# Patient Record
Sex: MALE | Race: WHITE | Employment: FULL TIME | URBAN - METROPOLITAN AREA
[De-identification: names, ages, dates, MRNs, and addresses within clinical notes are randomized per-mention and may not be internally consistent; named-entity substitution may affect disease eponyms.]

---

## 2018-02-22 ENCOUNTER — OFFICE VISIT (OUTPATIENT)
Dept: URGENT CARE | Facility: PHYSICIAN GROUP | Age: 48
End: 2018-02-22
Payer: COMMERCIAL

## 2018-02-22 ENCOUNTER — APPOINTMENT (OUTPATIENT)
Dept: RADIOLOGY | Facility: IMAGING CENTER | Age: 48
End: 2018-02-22
Attending: NURSE PRACTITIONER
Payer: COMMERCIAL

## 2018-02-22 VITALS
BODY MASS INDEX: 38.77 KG/M2 | OXYGEN SATURATION: 97 % | WEIGHT: 247 LBS | DIASTOLIC BLOOD PRESSURE: 80 MMHG | TEMPERATURE: 98.6 F | HEART RATE: 69 BPM | SYSTOLIC BLOOD PRESSURE: 126 MMHG | HEIGHT: 67 IN

## 2018-02-22 DIAGNOSIS — M25.562 LATERAL KNEE PAIN, LEFT: ICD-10-CM

## 2018-02-22 DIAGNOSIS — S86.912A STRAIN OF KNEE AND LEG, LEFT, INITIAL ENCOUNTER: ICD-10-CM

## 2018-02-22 PROCEDURE — 73562 X-RAY EXAM OF KNEE 3: CPT | Mod: TC,LT | Performed by: NURSE PRACTITIONER

## 2018-02-22 PROCEDURE — 99204 OFFICE O/P NEW MOD 45 MIN: CPT | Performed by: NURSE PRACTITIONER

## 2018-02-22 RX ORDER — MELOXICAM 7.5 MG/1
7.5 TABLET ORAL DAILY
Qty: 14 TAB | Refills: 0 | Status: SHIPPED | OUTPATIENT
Start: 2018-02-22 | End: 2019-11-22

## 2018-02-23 ASSESSMENT — ENCOUNTER SYMPTOMS
FALLS: 0
TINGLING: 0
FEVER: 0
WEAKNESS: 0
MYALGIAS: 1
SENSORY CHANGE: 0
CHILLS: 0

## 2018-02-23 NOTE — PROGRESS NOTES
"Subjective:      Devin Sampson is a 47 y.o. male who presents with Knee Pain (L knee pain, onset yesterday 3:30pm )            HPI  Devin is here for left knee pain. States had injured it a year ago \"rough housing\" but states he \"just lived with occasional ache and pain\". 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 C/o left knee lateral aspect pain. No RICE method instilled.     PMH:  has no past medical history on file.  MEDS:   Current Outpatient Prescriptions:   •  meloxicam (MOBIC) 7.5 MG Tab, Take 1 Tab by mouth every day. Do not use with Ibuprofen, Disp: 14 Tab, Rfl: 0  ALLERGIES: Not on File  SURGHX: History reviewed. No pertinent surgical history.  SOCHX:  reports that he has never smoked. He has never used smokeless tobacco. He reports that he does not drink alcohol or use drugs.  FH: Family history was reviewed, no pertinent findings to report    Review of Systems   Constitutional: Negative for chills, fever and malaise/fatigue.   Cardiovascular: Negative for leg swelling.   Musculoskeletal: Positive for joint pain and myalgias. Negative for falls.   Neurological: Negative for tingling, sensory change and weakness.   Endo/Heme/Allergies: Negative for environmental allergies.   All other systems reviewed and are negative.         Objective:     /80   Pulse 69   Temp 37 °C (98.6 °F)   Ht 1.702 m (5' 7\")   Wt 112 kg (247 lb)   SpO2 97%   BMI 38.69 kg/m²      Physical Exam   Constitutional: He is oriented to person, place, and time. Vital signs are normal. He appears well-developed and well-nourished. He is active and cooperative.  Non-toxic appearance. He does not have a sickly appearance. He does not appear ill. No distress.   HENT:   Head: Normocephalic.   Eyes: Conjunctivae and EOM are normal. Pupils are equal, round, and reactive to light.   Neck: Normal range of motion. Neck supple. "   Cardiovascular: Normal rate.    Pulmonary/Chest: Effort normal.   Musculoskeletal:        Left knee: He exhibits normal range of motion, no swelling, no effusion, no ecchymosis, no deformity, no laceration, no erythema, normal alignment, no LCL laxity, normal patellar mobility, no bony tenderness, normal meniscus and no MCL laxity. Tenderness found. Lateral joint line tenderness noted.        Legs:  No swelling, redness or signs of trauma seen at left knee joint. FROM but has discomfort with flex/extend. No abnormal gait.   Neurological: He is alert and oriented to person, place, and time.   Skin: Skin is warm and dry. He is not diaphoretic. No erythema.   Vitals reviewed.         FINDINGS:  There is no evidence of fracture or dislocation. Alignment is maintained. There is mild spurring of the lateral tibial plateau. No joint effusion is identified. There is minimal medial tibiofemoral joint space narrowing. There is mild spurring of the   tibial spines.     MA applied ace wrap  Assessment/Plan:     1. Lateral knee pain, left    - DX-KNEE 3 VIEWS LEFT; Future  - REFERRAL TO SPORTS MEDICINE  - meloxicam (MOBIC) 7.5 MG Tab; Take 1 Tab by mouth every day. Do not use with Ibuprofen  Dispense: 14 Tab; Refill: 0    2. Strain of knee and leg, left, initial encounter    - DX-KNEE 3 VIEWS LEFT; Future  - REFERRAL TO SPORTS MEDICINE  - meloxicam (MOBIC) 7.5 MG Tab; Take 1 Tab by mouth every day. Do not use with Ibuprofen  Dispense: 14 Tab; Refill: 0    May use cool compresses for swelling prn  May utilize RICE method prn  May apply topical analgesics prn  Perform proper body mechanics with lifting, twisting, bending and walking  Monitor for deformity, numbness/tingling in toes, decreased ROM with walking difficulty- need re-evaluation  Follow up with Sports Med, patient given paperwork info

## 2018-03-06 ENCOUNTER — OFFICE VISIT (OUTPATIENT)
Dept: MEDICAL GROUP | Facility: CLINIC | Age: 48
End: 2018-03-06
Payer: COMMERCIAL

## 2018-03-06 VITALS
WEIGHT: 247 LBS | TEMPERATURE: 97.9 F | SYSTOLIC BLOOD PRESSURE: 126 MMHG | OXYGEN SATURATION: 95 % | HEART RATE: 92 BPM | DIASTOLIC BLOOD PRESSURE: 84 MMHG | RESPIRATION RATE: 18 BRPM | BODY MASS INDEX: 38.77 KG/M2 | HEIGHT: 67 IN

## 2018-03-06 DIAGNOSIS — M25.462 KNEE EFFUSION, LEFT: ICD-10-CM

## 2018-03-06 DIAGNOSIS — M17.12 OSTEOARTHROSIS, LOCALIZED, PRIMARY, KNEE, LEFT: ICD-10-CM

## 2018-03-06 DIAGNOSIS — E66.09 CLASS 2 OBESITY DUE TO EXCESS CALORIES WITHOUT SERIOUS COMORBIDITY WITH BODY MASS INDEX (BMI) OF 38.0 TO 38.9 IN ADULT: ICD-10-CM

## 2018-03-06 PROCEDURE — 20610 DRAIN/INJ JOINT/BURSA W/O US: CPT | Mod: LT | Performed by: FAMILY MEDICINE

## 2018-03-06 PROCEDURE — 99203 OFFICE O/P NEW LOW 30 MIN: CPT | Mod: 25 | Performed by: FAMILY MEDICINE

## 2018-03-06 RX ORDER — TRIAMCINOLONE ACETONIDE 40 MG/ML
40 INJECTION, SUSPENSION INTRA-ARTICULAR; INTRAMUSCULAR ONCE
Status: COMPLETED | OUTPATIENT
Start: 2018-03-06 | End: 2018-03-06

## 2018-03-06 RX ADMIN — TRIAMCINOLONE ACETONIDE 40 MG: 40 INJECTION, SUSPENSION INTRA-ARTICULAR; INTRAMUSCULAR at 09:42

## 2018-03-06 NOTE — PROGRESS NOTES
"CHIEF COMPLAINT:  Devin Sampson male presenting at the request of  JAG Zee for evaluation of knee pain.     Devin Sampson is complaining of left knee pain  present for approximately 1 year  Rough play with a friend and valgus injury to the LEFT knee in early 2017   Pain is at the anterior, medial , lateral knee  Quality is aching  Pain is non-radiating   Improved with resting  Aggravated by going up ladders (works as a )  no prior problems with this area in the past, previous knee injury (played soccer)   Prior Treatments: seen at   Prior studies: X-Ray   Medications tried for pain include: meloxicam which helps some, but causing reflux  Mechanical Symptom history: No Locking    REVIEW OF SYSTEMS  No Nausea, No Vomiting, No Chest Pain, No Shortness of Breath, No Headache, Dizziness  Which occurs with his seasonal allergies    PAST MEDICAL HISTORY:   History reviewed. No pertinent past medical history.    PMH:  has no past medical history on file.  MEDS:   Current Outpatient Prescriptions:   •  meloxicam (MOBIC) 7.5 MG Tab, Take 1 Tab by mouth every day. Do not use with Ibuprofen, Disp: 14 Tab, Rfl: 0  ALLERGIES: No Known Allergies  SURGHX: History reviewed. No pertinent surgical history.  SOCHX:  reports that he has never smoked. He has never used smokeless tobacco. He reports that he does not drink alcohol or use drugs.  FH: Family history was reviewed, no pertinent findings to report     PHYSICAL EXAM:  /84   Pulse 92   Temp 36.6 °C (97.9 °F)   Resp 18   Ht 1.702 m (5' 7\")   Wt 112 kg (247 lb)   SpO2 95%   BMI 38.69 kg/m²       obese in no apparent distress, alert and oriented x 3.  Gait: antalgic     RIGHT Knee:  Slight Varus and No Swelling  Range of Motion Intact  Trace effusion  Patellar No tenderness and no apprehension  Medial Joint Line Non-tender and NEGATIVE Chrissy  Lateral Joint Line Non-tender and NEGATIVE Chrissy  Trace Laxity " with Varus stress  Trace Laxity with Valgus stress  Lachman's testing is Trace  Posterior Drawer Testing is Trace  The leg is otherwise neurovascularly intact    LEFT Knee:  Slight Varus and No Swelling   Range of Motion Slightly limited with Flexion  2+ effusion  Patellar Medial facet tenderness and Extensor mechanism intact  Medial Joint Line Non-tender and NEGATIVE Chrissy  Lateral Joint Line Tenderness and NEGATIVE Chrissy  Trace Laxity with Varus stress  Trace Laxity with Valgus stress  Lachman's testing is Trace  Posterior Drawer Testing is Trace  The leg is otherwise neurovascularly intact    Additional Findings: Tight hamstrings      1. Osteoarthrosis, localized, primary, knee, left  triamcinolone acetonide (KENALOG-40) injection 40 mg   2. Knee effusion, left     3. Class 2 obesity due to excess calories without serious comorbidity with body mass index (BMI) of 38.0 to 38.9 in adult       MILD effusion in the LEFT knee  Osteoarthritis which has been symptomatic on and off for the past year since an injury while horsing around with a coworker  No recent injury  Intra-articular LEFT knee corticosteroid injection performed in the office TODAY (March 6, 2018)  Fitted with functional knee brace    PROCEDURE NOTE:  left Knee corticosteroid injection  Risks and benefits discussed  Informed consent obtained  Knee prepped in sterile fashion utilizing a felecia- inferior approach  40 mg of Kenalog and 5 cc of Marcaine injected into the knee joint space  Vapocoolant spray was utilized  Patient tolerated the procedure well  Postprocedure care and reflex discussed    Return in about 4 weeks (around 4/3/2018). To see how he is doing after his LEFT knee intra-articular corticosteroid injection                                                Results for orders placed in visit on 02/22/18   DX-KNEE 3 VIEWS LEFT    Impression No evidence of acute fracture or dislocation.    Minimal degenerative changes.                                               done elsewhere and reviewed independently by me    Thank you JAG Zee for allowing me to participate in caring for your patient.

## 2018-04-16 ENCOUNTER — OFFICE VISIT (OUTPATIENT)
Dept: MEDICAL GROUP | Facility: CLINIC | Age: 48
End: 2018-04-16
Payer: COMMERCIAL

## 2018-04-16 VITALS
TEMPERATURE: 97.6 F | RESPIRATION RATE: 16 BRPM | OXYGEN SATURATION: 97 % | DIASTOLIC BLOOD PRESSURE: 82 MMHG | HEIGHT: 67 IN | BODY MASS INDEX: 38.77 KG/M2 | WEIGHT: 247 LBS | SYSTOLIC BLOOD PRESSURE: 122 MMHG | HEART RATE: 74 BPM

## 2018-04-16 DIAGNOSIS — M62.89 HAMSTRING TIGHTNESS: ICD-10-CM

## 2018-04-16 DIAGNOSIS — M17.12 OSTEOARTHROSIS, LOCALIZED, PRIMARY, KNEE, LEFT: ICD-10-CM

## 2018-04-16 DIAGNOSIS — M70.52 PES ANSERINUS BURSITIS OF LEFT KNEE: ICD-10-CM

## 2018-04-16 PROCEDURE — 99212 OFFICE O/P EST SF 10 MIN: CPT | Mod: 25 | Performed by: FAMILY MEDICINE

## 2018-04-16 RX ORDER — TRIAMCINOLONE ACETONIDE 40 MG/ML
40 INJECTION, SUSPENSION INTRA-ARTICULAR; INTRAMUSCULAR ONCE
Status: COMPLETED | OUTPATIENT
Start: 2018-04-16 | End: 2018-04-16

## 2018-04-16 RX ADMIN — TRIAMCINOLONE ACETONIDE 40 MG: 40 INJECTION, SUSPENSION INTRA-ARTICULAR; INTRAMUSCULAR at 12:53

## 2018-04-16 NOTE — PROGRESS NOTES
"CHIEF COMPLAINT:  FOLLOW UP LEFT knee pain.     00present for approximately 1 year  Rough play with a friend and valgus injury to the LEFT knee in early 2017   Pain is at the anterior, medial , lateral knee  Quality is aching  Pain is non-radiating   Improved with resting  Aggravated by going up ladders (works as a )  no prior problems with this area in the past, previous knee injury (played soccer)   Prior Treatments: seen at   Prior studies: X-Ray   Medications tried for pain include: meloxicam which helps some, but causing reflux  Mechanical Symptom history: No Locking    REVIEW OF SYSTEMS  No Nausea, No Vomiting, No Chest Pain, No Shortness of Breath, No Headache, Dizziness  Which occurs with his seasonal allergies    PAST MEDICAL HISTORY:   History reviewed. No pertinent past medical history.    PMH:  has no past medical history on file.  MEDS:   Current Outpatient Prescriptions:   •  meloxicam (MOBIC) 7.5 MG Tab, Take 1 Tab by mouth every day. Do not use with Ibuprofen, Disp: 14 Tab, Rfl: 0  ALLERGIES: No Known Allergies  SURGHX: No past surgical history on file.  SOCHX:  reports that he has never smoked. He has never used smokeless tobacco. He reports that he does not drink alcohol or use drugs.  FH: Family history was reviewed, no pertinent findings to report     PHYSICAL EXAM:  /82   Pulse 74   Temp 36.4 °C (97.6 °F)   Resp 16   Ht 1.702 m (5' 7\")   Wt 112 kg (247 lb)   SpO2 97%   BMI 38.69 kg/m²      obese in no apparent distress, alert and oriented x 3.  Gait: antalgic     RIGHT Knee:  Slight Varus and No Swelling  Range of Motion Intact  Trace effusion  Patellar No tenderness and no apprehension  Medial Joint Line Non-tender and NEGATIVE Chrissy  Lateral Joint Line Non-tender and NEGATIVE Chrissy  Trace Laxity with Varus stress  Trace Laxity with Valgus stress  Lachman's testing is Trace  Posterior Drawer Testing is Trace  The leg is otherwise neurovascularly intact    LEFT " Knee:  Slight Varus and No Swelling   Range of Motion Slightly limited with Flexion  2+ effusion  Patellar Medial facet tenderness and Extensor mechanism intact  Medial Joint Line Non-tender and NEGATIVE Chrissy  Lateral Joint Line Tenderness and NEGATIVE Chrissy  Trace Laxity with Varus stress  Trace Laxity with Valgus stress  Lachman's testing is Trace  Posterior Drawer Testing is Trace  The leg is otherwise neurovascularly intact    Additional Findings: Tight hamstrings                                                 1. Osteoarthrosis, localized, primary, knee, left     2. Pes anserinus bursitis of left knee  REFERRAL TO PHYSICAL THERAPY Reason for Therapy: Eval/Treat/Report   3. Hamstring tightness       MILD effusion in the LEFT knee  Intra-articular LEFT knee corticosteroid injection performed (March 6, 2018) helped for 4 days  Functional knee brace helps some    Still pain at pes bursa region and lateral distal hamstring insertion  Tight hamstrings    Works as a , up and down ladders all day    PROCEDURE NOTE:  left Knee PES BURSA corticosteroid injection  Risks and benefits discussed  Informed consent obtained  Knee prepped in sterile fashion utilizing a felecia-medial approach  40 mg of Kenalog and 1 cc of Marcaine injected into the knee joint space  Vapocoolant spray was utilized  Patient tolerated the procedure well  Postprocedure care and red flags discussed    Return in about 4 weeks (around 5/14/2018). To see how he is doing after his LEFT knee PES BURSA corticosteroid injection and PT at [facility]    Results for orders placed in visit on 02/22/18   DX-KNEE 3 VIEWS LEFT    Impression No evidence of acute fracture or dislocation.    Minimal degenerative changes.                                              Thank you JAG Zee for allowing me to participate in caring for your patient.

## 2018-05-16 ENCOUNTER — PHYSICAL THERAPY (OUTPATIENT)
Dept: PHYSICAL THERAPY | Facility: REHABILITATION | Age: 48
End: 2018-05-16
Attending: FAMILY MEDICINE
Payer: COMMERCIAL

## 2018-05-16 DIAGNOSIS — M70.52 PES ANSERINUS BURSITIS OF LEFT KNEE: ICD-10-CM

## 2018-05-16 PROCEDURE — 97110 THERAPEUTIC EXERCISES: CPT

## 2018-05-16 PROCEDURE — 97033 APP MDLTY 1+IONTPHRSIS EA 15: CPT

## 2018-05-16 PROCEDURE — 97161 PT EVAL LOW COMPLEX 20 MIN: CPT

## 2018-05-16 ASSESSMENT — ENCOUNTER SYMPTOMS
ALLEVIATING FACTOR: REST
PAIN SCALE: 0
PAIN SCALE AT HIGHEST: 8
PAIN TIMING: INTERMITTENT
QUALITY: STABBING
PAIN SCALE AT LOWEST: 0

## 2018-05-16 NOTE — OP THERAPY EVALUATION
Outpatient Physical Therapy  INITIAL EVALUATION    [facility] [address].  [address]  [place] [address]  Phone:  [phone]  Fax:  [phone]    Date of Evaluation: 2018    Patient: Devin Sampson  YOB: 1970  MRN: [id number]     Referring Provider: Sukhdev Juarez M.D.  [address] [place] [address] [place] [address]   Referring Diagnosis Pes anserinus bursitis of left knee [M70.52]     Time Calculation  Start time: 830  Stop time: 925 Time Calculation (min): 55 minutes     Physical Therapy Occurrence Codes    Date of onset of impairment:  17   Date physical therapy care plan established or reviewed:  18   Date physical therapy treatment started:  18          Chief Complaint: Knee Injury    Visit Diagnoses     ICD-10-CM   1. Pes anserinus bursitis of left knee M70.52         Subjective   History of Present Illness:     History of chief complaint:  47 y/o male states ~ 1 year ago experienced (L) knee pain wrestling with friend. However pain has progressed to point of pain whenever up/down ladder.    Pain:     Current pain ratin    At best pain ratin    At worst pain ratin    Quality:  Stabbing    Pain timing:  Intermittent    Aggravating factors:  Climbing ladders . He is a  and must do this daily    Relieving factors:  Rest    Activity Tolerance:     Work:  [employer]    Treatments:     Treatments to date:  Had shot which helped but pain gradually returned    Patient Goals:     Patient goals for therapy:  Perform job with out pain      History reviewed. No pertinent past medical history.  No past surgical history on file.    Precautions:       Objective   Observation and functional movement:  Increased BMI.     Range of motion and strength:    Slight decreased passive flexion. Slightlt tight ITB (L) compared to (R). Pain with resisted knee flexion    Sensation and reflexes:     intact    Palpation and joint  "mobility:     TTP proximal/lateral tib/fib region on (L)    Special tests:      Ligamentous/stability tests (-). Slight decreased Wt bearing (L) squat test            Therapeutic Exercises (CPT 27351):     1. ITB stretch    2. HS stretches, 3x15\"    Therapeutic Treatments and Modalities:     1. Iontophoresis(CPT 90697), pes anerine region , 20'    Therapeutic Treatment and Modalities Summary: MOBs to improve Flexion, fib head mob,    Time-based treatments/modalities:          Assessment, Response and Plan:   Impairments: pain with function    Assessment details:  No pain at rest, only when stressed. TTP region pes anserine  Goals:   Short Term Goals:   Improved AROM 2) return demonstration HEP 3)less pain on ladder 4)less TTP  Short term goal time span:  2-4 weeks      Long Term Goals:    1) Pt will be able to up/down ladder for extended periods with very minimal pain . 2) independent HEP  Long term goal time span:  4-6 weeks    Plan:   Planned therapy interventions:  Iontophoresis (CPT 60193), Manual Therapy (CPT 87707), Therapeutic Activities (CPT 41875) and E Stim Unattended (CPT 52773)  Frequency:  2x week  Duration in weeks:  5      Functional Limitation G-Codes and Severity Modifiers      Current:  N/A   Goal:  N/A     Referring provider co-signature:  I have reviewed this plan of care and my co-signature certifies the need for services.      Physician Signature: ________________________________ Date: ______________     "

## 2018-05-18 ENCOUNTER — APPOINTMENT (OUTPATIENT)
Dept: PHYSICAL THERAPY | Facility: REHABILITATION | Age: 48
End: 2018-05-18
Attending: FAMILY MEDICINE
Payer: COMMERCIAL

## 2018-05-21 ENCOUNTER — PHYSICAL THERAPY (OUTPATIENT)
Dept: PHYSICAL THERAPY | Facility: REHABILITATION | Age: 48
End: 2018-05-21
Attending: FAMILY MEDICINE
Payer: COMMERCIAL

## 2018-05-21 DIAGNOSIS — M70.52 PES ANSERINUS BURSITIS OF LEFT KNEE: ICD-10-CM

## 2018-05-21 PROCEDURE — 97014 ELECTRIC STIMULATION THERAPY: CPT

## 2018-05-21 PROCEDURE — 97110 THERAPEUTIC EXERCISES: CPT

## 2018-05-21 PROCEDURE — 97140 MANUAL THERAPY 1/> REGIONS: CPT

## 2018-05-21 NOTE — OP THERAPY DAILY TREATMENT
"  Outpatient Physical Therapy  DAILY TREATMENT     [facility] [address].  [address]  [place] [address]  Phone:  [phone]  Fax:  [phone]    Date: 05/21/2018    Patient: Devin Sampson  YOB: 1970  MRN: [id number]     Time Calculation  Start time: 1430  Stop time: 1515 Time Calculation (min): 45 minutes     Chief Complaint: Knee Problem    Visit #: 2    SUBJECTIVE:  States compliant with HEP. Still irritation on ladders    OBJECTIVE:  Current objective measures: TTP (L) pes anserine region, distal lateral H.S.          Therapeutic Exercises (CPT 60960):     1. Noble strrethcer, 3x15\"    2. Calf rocker , 3x15\"    3. Swiss ball roll up mini bridge, 6    Therapeutic Treatments and Modalities:     1. Manual Therapy (CPT 74134)    2. E Stim Unattended (CPT 60959), lateral HS, pes An. region    Therapeutic Treatment and Modalities Summary: IASTM distal lateral patella region, lateral HC. Taping pes an. Region..    Time-based treatments/modalities:  Manual therapy minutes (CPT 34781): 15 minutes  Therapeutic exercise minutes (CPT 56166): 15 minutes     ASSESSMENT:   Response to treatment: Hopefully tape will help  PLAN/RECOMMENDATIONS:   Plan for treatment: therapy treatment to continue next visit.  Planned interventions for next visit: E-stim unattended (CPT 30699), manual therapy (CPT 59603) and therapeutic exercise (CPT 31744).      "

## 2018-05-24 ENCOUNTER — PHYSICAL THERAPY (OUTPATIENT)
Dept: PHYSICAL THERAPY | Facility: REHABILITATION | Age: 48
End: 2018-05-24
Attending: FAMILY MEDICINE
Payer: COMMERCIAL

## 2018-05-24 DIAGNOSIS — M70.52 PES ANSERINUS BURSITIS OF LEFT KNEE: ICD-10-CM

## 2018-05-24 PROCEDURE — 97110 THERAPEUTIC EXERCISES: CPT

## 2018-05-24 PROCEDURE — 97014 ELECTRIC STIMULATION THERAPY: CPT

## 2018-05-24 PROCEDURE — 97140 MANUAL THERAPY 1/> REGIONS: CPT

## 2018-05-24 NOTE — OP THERAPY DAILY TREATMENT
"  Outpatient Physical Therapy  DAILY TREATMENT     [facility] [address].  [address]  [place] [address]  Phone:  [phone]  Fax:  [phone]    Date: 05/24/2018    Patient: Devin Sampson  YOB: 1970  MRN: [id number]     Time Calculation  Start time: 1515  Stop time: 1600 Time Calculation (min): 45 minutes     Chief Complaint: Knee Injury    Visit #: 3    SUBJECTIVE:  States a little less pain. States taping helps    OBJECTIVE:  Current objective measures: Not as tender pes anserine region          Therapeutic Exercises (CPT 28043):     1. HS karmen stretcher    2. Calf rockerstretch    3. Knee extension against ball , 15x5\" hold    4. Swiss ball roll up bridge, 10x    Therapeutic Treatments and Modalities:     1. E Stim Unattended (CPT 04918), knee, 15'    2. Manual Therapy (CPT 46224), lat.post HS, LAt inferior pes ans. region    Therapeutic Treatment and Modalities Summary: Taping to knee.     Time-based treatments/modalities:                ASSESSMENT:   Response to treatment: States knee sometimes feels unsteady    PLAN/RECOMMENDATIONS:   Plan for treatment: therapy treatment to continue next visit.  Planned interventions for next visit: E-stim unattended (CPT 36623), manual therapy (CPT 90404) and therapeutic exercise (CPT 96831).      "

## 2018-06-04 ENCOUNTER — PHYSICAL THERAPY (OUTPATIENT)
Dept: PHYSICAL THERAPY | Facility: REHABILITATION | Age: 48
End: 2018-06-04
Attending: FAMILY MEDICINE
Payer: COMMERCIAL

## 2018-06-04 DIAGNOSIS — M70.52 PES ANSERINUS BURSITIS OF LEFT KNEE: ICD-10-CM

## 2018-06-04 PROCEDURE — 97110 THERAPEUTIC EXERCISES: CPT

## 2018-06-04 PROCEDURE — 97140 MANUAL THERAPY 1/> REGIONS: CPT

## 2018-06-04 NOTE — OP THERAPY DAILY TREATMENT
Outpatient Physical Therapy  DAILY TREATMENT     [facility] [address].  [address]  [place] [address]  Phone:  [phone]  Fax:  [phone]    Date: 06/04/2018    Patient: Devin Sampson  YOB: 1970  MRN: [id number]     Time Calculation  Start time: 1600  Stop time: 1644 Time Calculation (min): 44 minutes     Chief Complaint: Knee Problem    Visit #: 4    SUBJECTIVE:  States less pain . He was better able to ascend ladder less paion    OBJECTIVE:  Current objective measures: Minor weakness last 20- 0' knee flexion.          Therapeutic Exercises (CPT 13017):     1. Prone HS 0-20 flexion with T-band, 20    2. Standing HS 3-way stretch    Therapeutic Treatments and Modalities:     1. Manual Therapy (CPT 76487)    2. Hot or Cold Pack Therapy (CPT 77362), MH to HS    Therapeutic Treatment and Modalities Summary: Mob to knee,IASTM region pes anserine    Time-based treatments/modalities:  Manual therapy minutes (CPT 78788): 15 minutes  Therapeutic exercise minutes (CPT 31452): 15 minutes           ASSESSMENT:   Response to treatment: gradually progressing    PLAN/RECOMMENDATIONS:   Plan for treatment: therapy treatment to continue next visit.  Planned interventions for next visit: manual therapy (CPT 79118) and therapeutic exercise (CPT 42128). Modalities PRN

## 2018-06-12 ENCOUNTER — PHYSICAL THERAPY (OUTPATIENT)
Dept: PHYSICAL THERAPY | Facility: REHABILITATION | Age: 48
End: 2018-06-12
Attending: FAMILY MEDICINE
Payer: COMMERCIAL

## 2018-06-12 DIAGNOSIS — M70.52 PES ANSERINUS BURSITIS OF LEFT KNEE: ICD-10-CM

## 2018-06-12 PROCEDURE — 97110 THERAPEUTIC EXERCISES: CPT

## 2018-06-12 PROCEDURE — 97014 ELECTRIC STIMULATION THERAPY: CPT

## 2018-06-12 PROCEDURE — 97140 MANUAL THERAPY 1/> REGIONS: CPT

## 2018-06-12 NOTE — OP THERAPY DAILY TREATMENT
Outpatient Physical Therapy  DAILY TREATMENT     [facility] [address]  [address]  [place] [address]  Phone:  [phone]  Fax:  [phone]    Date: 06/12/2018    Patient: Devin Sampson  YOB: 1970  MRN: [id number]     Time Calculation  Start time: 0417  Stop time: 0505 Time Calculation (min): 48 minutes     Visit #: 5    SUBJECTIVE:  States less pain . He was better able to ascend ladder less paion    OBJECTIVE:  Full arom, no pain with resisted tests  TTP:lateral gastroc, pes anirine, ant tib origin          Therapeutic Exercises (CPT 12613):       Therapeutic Exercise Summary: Astym: lateral gastroc/pes/ant tib  Instructed in heel raises and tri-planar loaded gastroc stretch--hep  Bike x 8'   russian : semimembranosis/pes/lateral gastroc and ant. Tibialis  w/ MHP x 15'    Therapeutic Treatments and Modalities:     Therapeutic Treatment and Modalities Summary: Mob to knee,IASTM region pes anserine    Time-based treatments/modalities:  Manual therapy minutes (CPT 09734): 15 minutes  Therapeutic exercise minutes (CPT 69491): 15 minutes       2/10--lateral left gastroc  0/10--no pain  ASSESSMENT:   Abolished pain after treatment with noted TTP lateral gastoc. Tolerated ex w/ decreased pain    PLAN/RECOMMENDATIONS:   post chain ex, Astym, bike, sls balance, sfma assessment

## 2018-06-14 ENCOUNTER — PHYSICAL THERAPY (OUTPATIENT)
Dept: PHYSICAL THERAPY | Facility: REHABILITATION | Age: 48
End: 2018-06-14
Attending: FAMILY MEDICINE
Payer: COMMERCIAL

## 2018-06-14 DIAGNOSIS — M70.52 PES ANSERINUS BURSITIS OF LEFT KNEE: ICD-10-CM

## 2018-06-14 PROCEDURE — 97014 ELECTRIC STIMULATION THERAPY: CPT

## 2018-06-14 PROCEDURE — 97110 THERAPEUTIC EXERCISES: CPT

## 2018-06-14 NOTE — OP THERAPY DAILY TREATMENT
Outpatient Physical Therapy  DAILY TREATMENT     [facility] [address].  [address]  [place] [address]  Phone:  [phone]  Fax:  [phone]    Date: 06/14/2018    Patient: Devin Sampson  YOB: 1970  MRN: [id number]     Time Calculation  Start time: 0347  Stop time: 0430 Time Calculation (min): 43 minutes     Visit #: 6    SUBJECTIVE:  Sore today and worse with steps    OBJECTIVE:  Full arom, no pain with resisted tests  TTP:lateral gastroc, pes anirine, ant tib origin          Therapeutic Exercises (CPT 58084):     1. Nu -step #  x 10'     2. Ball bridges , 1'  x 2    3. Ball roll in/out hamstring focus      Therapeutic Exercise Summary: Astym: lateral gastroc/pes/ant tib  p/s fib mobs gd 3-4  IASTM: lateral gastroc  Instructed in heel raises and tri-planar loaded gastroc stretch--hep  Bike x 8'   russian : semimembranosis/pes/lateral gastroc and ant. Tibialis  w/ MHP x 15'    Therapeutic Treatments and Modalities:     Therapeutic Treatment and Modalities Summary: Mob to knee,IASTM region pes anserine    Time-based treatments/modalities:  Therapeutic exercise minutes (CPT 36156): 23 minutes       4/10--lateral left gastroc  0/10--no pain  ASSESSMENT:   Abolished pain after treatment with noted TTP lateral gastoc/ant tib and lateral hamstrings along with some TTP popliteus--presents symptoms indicate posterior/lateral knee strain/sprain  PLAN/RECOMMENDATIONS:   post chain ex, Astym, bike, sls balance, sfma assessment

## 2018-06-19 ENCOUNTER — APPOINTMENT (OUTPATIENT)
Dept: PHYSICAL THERAPY | Facility: REHABILITATION | Age: 48
End: 2018-06-19
Attending: FAMILY MEDICINE
Payer: COMMERCIAL

## 2018-06-21 ENCOUNTER — APPOINTMENT (OUTPATIENT)
Dept: PHYSICAL THERAPY | Facility: REHABILITATION | Age: 48
End: 2018-06-21
Attending: FAMILY MEDICINE
Payer: COMMERCIAL

## 2018-06-26 ENCOUNTER — PHYSICAL THERAPY (OUTPATIENT)
Dept: PHYSICAL THERAPY | Facility: REHABILITATION | Age: 48
End: 2018-06-26
Attending: FAMILY MEDICINE
Payer: COMMERCIAL

## 2018-06-26 DIAGNOSIS — M70.52 PES ANSERINUS BURSITIS OF LEFT KNEE: ICD-10-CM

## 2018-06-26 PROCEDURE — 97140 MANUAL THERAPY 1/> REGIONS: CPT

## 2018-06-26 PROCEDURE — 97014 ELECTRIC STIMULATION THERAPY: CPT

## 2018-06-26 PROCEDURE — 97110 THERAPEUTIC EXERCISES: CPT

## 2018-06-26 NOTE — OP THERAPY DAILY TREATMENT
"  Outpatient Physical Therapy  DAILY TREATMENT     [facility] [address]  [address]  [place] [address]  Phone:  [phone]  Fax:  [phone]    Date: 06/26/2018    Patient: Devin Sampson  YOB: 1970  MRN: [id number]     Time Calculation  Start time: 0350  Stop time: 0430 Time Calculation (min): 40 minutes     Visit #: 7    SUBJECTIVE:  overalll better  But still sore when in cage at work    OBJECTIVE:    TTP:ant tib and hams insertion          Therapeutic Exercises (CPT 77561):     2. Ball bridges , 2'    3. Ball roll in/out hamstring focus      Therapeutic Exercise Summary: IASTM: FL/ant tib/hams insertion  p/s fib mobs gd 3-4      Albanian : semimembranosis/pes/lateral gastroc and ant. Tibialis  w/ MHP x 15'    Therapeutic Treatments and Modalities:     Therapeutic Treatment and Modalities Summary: [place] Islander to ant tib/fibularis longus 5/5\" mhp and 15'    Time-based treatments/modalities:  Manual therapy minutes (CPT 37872): 10 minutes  Therapeutic exercise minutes (CPT 38790): 13 minutes       0-1/10--ok todayateral left gastroc  0/10--no pain  ASSESSMENT:   ttp ant tib and hams w. Hypomobile fib head  PLAN/RECOMMENDATIONS:   post chain ex, iastm, bike, sls balance, sfma assessment      "

## 2018-06-28 ENCOUNTER — PHYSICAL THERAPY (OUTPATIENT)
Dept: PHYSICAL THERAPY | Facility: REHABILITATION | Age: 48
End: 2018-06-28
Attending: FAMILY MEDICINE
Payer: COMMERCIAL

## 2018-06-28 DIAGNOSIS — M70.52 PES ANSERINUS BURSITIS OF LEFT KNEE: ICD-10-CM

## 2018-06-28 PROCEDURE — 97014 ELECTRIC STIMULATION THERAPY: CPT

## 2018-06-28 PROCEDURE — 97110 THERAPEUTIC EXERCISES: CPT

## 2018-06-28 PROCEDURE — 97140 MANUAL THERAPY 1/> REGIONS: CPT

## 2018-06-28 NOTE — OP THERAPY DAILY TREATMENT
"  Outpatient Physical Therapy  DAILY TREATMENT     [facility] [address].  [address]  [place] [address]  Phone:  [phone]  Fax:  [phone]    Date: 06/28/2018    Patient: Devin Sampson  YOB: 1970  MRN: [id number]     Time Calculation  Start time: 0349  Stop time: 0430 Time Calculation (min): 41 minutes     Visit #: 8    SUBJECTIVE:  overalll better  But still sore when in cage at work when standing in one place  Denies locking or giving way  OBJECTIVE:  + thessaly and christian's test for lateral meniscus tear  TTP:ant tib and hams insertion          Therapeutic Exercises (CPT 27424):     2. Ball bridges , 2'    3. Ball roll in/out hamstring focus      Therapeutic Exercise Summary: CFM: lateral joint   IASTM: ;lateral joint line  p/a fib mobs gd 3-4  Bike x 8'      russian : semimembranosis/pes/lateral gastroc and ant. Tibialis  w/ MHP x 15'    Therapeutic Treatments and Modalities:     Therapeutic Treatment and Modalities Summary: Dominican to ant tib/fibularis longus 5/5\" mhp and 15'    Time-based treatments/modalities:  Manual therapy minutes (CPT 38513): 15 minutes  Therapeutic exercise minutes (CPT 24802): 8 minutes       0-1/10--ok todayateral left gastroc  0/10--no pain  ASSESSMENT:   As pain lessens it continues to changes in location with pin-point pain ant/lateral tibial border  Suspect possible lateral mensicus tear  PLAN/RECOMMENDATIONS:   post chain ex, iastm, bike, sls balance, sfma assessment      "

## 2018-07-13 ENCOUNTER — TELEPHONE (OUTPATIENT)
Dept: PHYSICAL THERAPY | Facility: REHABILITATION | Age: 48
End: 2018-07-13

## 2018-07-13 ENCOUNTER — OFFICE VISIT (OUTPATIENT)
Dept: MEDICAL GROUP | Facility: MEDICAL CENTER | Age: 48
End: 2018-07-13
Payer: COMMERCIAL

## 2018-07-13 VITALS
OXYGEN SATURATION: 96 % | HEIGHT: 67 IN | SYSTOLIC BLOOD PRESSURE: 118 MMHG | DIASTOLIC BLOOD PRESSURE: 76 MMHG | WEIGHT: 273 LBS | RESPIRATION RATE: 16 BRPM | BODY MASS INDEX: 42.85 KG/M2 | TEMPERATURE: 97.6 F | HEART RATE: 86 BPM

## 2018-07-13 DIAGNOSIS — Z00.00 ANNUAL PHYSICAL EXAM: ICD-10-CM

## 2018-07-13 DIAGNOSIS — E66.01 MORBID OBESITY WITH BMI OF 40.0-44.9, ADULT (HCC): ICD-10-CM

## 2018-07-13 DIAGNOSIS — R10.13 EPIGASTRIC PAIN: ICD-10-CM

## 2018-07-13 DIAGNOSIS — K29.00 ACUTE GASTRITIS WITHOUT HEMORRHAGE, UNSPECIFIED GASTRITIS TYPE: ICD-10-CM

## 2018-07-13 PROBLEM — R10.11 RUQ PAIN: Status: ACTIVE | Noted: 2018-07-13

## 2018-07-13 PROCEDURE — 99204 OFFICE O/P NEW MOD 45 MIN: CPT | Performed by: NURSE PRACTITIONER

## 2018-07-13 RX ORDER — OMEPRAZOLE 20 MG/1
20 CAPSULE, DELAYED RELEASE ORAL DAILY
Qty: 30 CAP | Refills: 0 | Status: SHIPPED | OUTPATIENT
Start: 2018-07-13 | End: 2019-11-22

## 2018-07-13 RX ORDER — SUCRALFATE 1 G/1
1 TABLET ORAL
Qty: 120 TAB | Refills: 0 | Status: SHIPPED | OUTPATIENT
Start: 2018-07-13 | End: 2019-11-22

## 2018-07-13 RX ORDER — IBUPROFEN 800 MG/1
800 TABLET ORAL EVERY 8 HOURS PRN
COMMUNITY
End: 2020-08-28

## 2018-07-13 ASSESSMENT — PATIENT HEALTH QUESTIONNAIRE - PHQ9: CLINICAL INTERPRETATION OF PHQ2 SCORE: 0

## 2018-07-13 NOTE — OP THERAPY DISCHARGE SUMMARY
Outpatient Physical Therapy  DISCHARGE SUMMARY NOTE      [facility] Physical Therapy [address].  [address]  [place] [address]  Phone:  [phone]  Fax:  [phone]        Patient Name:  Devin Sampson  :  1970  MR#:  [id number]    HICN:       Visits:8         Cancel/No-Show:     Diagnosis/ICD-10: hamstring strain    Referring Provider: sriram DRAKE Date:  18  Onset Date:       Your patient is being discharged from Physical therapy with the following comments:  Goals Partially Met  Patient has 3 no-shows    Comments:     As pain lessens it continues to changes in location with pin-point pain ant/lateral tibial border.  Suspect possible lateral mensicus tear that may or may not heal on its own    Recommendations:  Patient reports overall much better with pain only when he is in certain positions at work  D/c form treatment per clinic attendance policy    Chandrakant Guido, PT, DPT, OCS

## 2018-07-14 ENCOUNTER — HOSPITAL ENCOUNTER (OUTPATIENT)
Dept: LAB | Facility: MEDICAL CENTER | Age: 48
End: 2018-07-14
Attending: NURSE PRACTITIONER
Payer: COMMERCIAL

## 2018-07-14 DIAGNOSIS — E66.01 MORBID OBESITY WITH BMI OF 40.0-44.9, ADULT (HCC): ICD-10-CM

## 2018-07-14 DIAGNOSIS — Z00.00 ANNUAL PHYSICAL EXAM: ICD-10-CM

## 2018-07-14 LAB
25(OH)D3 SERPL-MCNC: 14 NG/ML (ref 30–100)
ALBUMIN SERPL BCP-MCNC: 4.1 G/DL (ref 3.2–4.9)
ALBUMIN/GLOB SERPL: 1.5 G/DL
ALP SERPL-CCNC: 72 U/L (ref 30–99)
ALT SERPL-CCNC: 27 U/L (ref 2–50)
ANION GAP SERPL CALC-SCNC: 7 MMOL/L (ref 0–11.9)
AST SERPL-CCNC: 18 U/L (ref 12–45)
BASOPHILS # BLD AUTO: 0.7 % (ref 0–1.8)
BASOPHILS # BLD: 0.05 K/UL (ref 0–0.12)
BILIRUB SERPL-MCNC: 0.5 MG/DL (ref 0.1–1.5)
BUN SERPL-MCNC: 18 MG/DL (ref 8–22)
CALCIUM SERPL-MCNC: 9.5 MG/DL (ref 8.5–10.5)
CHLORIDE SERPL-SCNC: 109 MMOL/L (ref 96–112)
CHOLEST SERPL-MCNC: 159 MG/DL (ref 100–199)
CO2 SERPL-SCNC: 24 MMOL/L (ref 20–33)
CREAT SERPL-MCNC: 0.86 MG/DL (ref 0.5–1.4)
EOSINOPHIL # BLD AUTO: 0.11 K/UL (ref 0–0.51)
EOSINOPHIL NFR BLD: 1.5 % (ref 0–6.9)
ERYTHROCYTE [DISTWIDTH] IN BLOOD BY AUTOMATED COUNT: 42.7 FL (ref 35.9–50)
EST. AVERAGE GLUCOSE BLD GHB EST-MCNC: 126 MG/DL
GLOBULIN SER CALC-MCNC: 2.8 G/DL (ref 1.9–3.5)
GLUCOSE SERPL-MCNC: 94 MG/DL (ref 65–99)
HBA1C MFR BLD: 6 % (ref 0–5.6)
HCT VFR BLD AUTO: 44.1 % (ref 42–52)
HDLC SERPL-MCNC: 47 MG/DL
HGB BLD-MCNC: 15 G/DL (ref 14–18)
IMM GRANULOCYTES # BLD AUTO: 0.03 K/UL (ref 0–0.11)
IMM GRANULOCYTES NFR BLD AUTO: 0.4 % (ref 0–0.9)
LDLC SERPL CALC-MCNC: 89 MG/DL
LYMPHOCYTES # BLD AUTO: 3.03 K/UL (ref 1–4.8)
LYMPHOCYTES NFR BLD: 41.5 % (ref 22–41)
MCH RBC QN AUTO: 31.8 PG (ref 27–33)
MCHC RBC AUTO-ENTMCNC: 34 G/DL (ref 33.7–35.3)
MCV RBC AUTO: 93.6 FL (ref 81.4–97.8)
MONOCYTES # BLD AUTO: 0.51 K/UL (ref 0–0.85)
MONOCYTES NFR BLD AUTO: 7 % (ref 0–13.4)
NEUTROPHILS # BLD AUTO: 3.58 K/UL (ref 1.82–7.42)
NEUTROPHILS NFR BLD: 48.9 % (ref 44–72)
NRBC # BLD AUTO: 0 K/UL
NRBC BLD-RTO: 0 /100 WBC
PLATELET # BLD AUTO: 336 K/UL (ref 164–446)
PMV BLD AUTO: 10 FL (ref 9–12.9)
POTASSIUM SERPL-SCNC: 4.1 MMOL/L (ref 3.6–5.5)
PROT SERPL-MCNC: 6.9 G/DL (ref 6–8.2)
RBC # BLD AUTO: 4.71 M/UL (ref 4.7–6.1)
SODIUM SERPL-SCNC: 140 MMOL/L (ref 135–145)
TRIGL SERPL-MCNC: 114 MG/DL (ref 0–149)
TSH SERPL DL<=0.005 MIU/L-ACNC: 0.98 UIU/ML (ref 0.38–5.33)
WBC # BLD AUTO: 7.3 K/UL (ref 4.8–10.8)

## 2018-07-14 PROCEDURE — 84154 ASSAY OF PSA FREE: CPT

## 2018-07-14 PROCEDURE — 83036 HEMOGLOBIN GLYCOSYLATED A1C: CPT

## 2018-07-14 PROCEDURE — 36415 COLL VENOUS BLD VENIPUNCTURE: CPT

## 2018-07-14 PROCEDURE — 85025 COMPLETE CBC W/AUTO DIFF WBC: CPT

## 2018-07-14 PROCEDURE — 84153 ASSAY OF PSA TOTAL: CPT

## 2018-07-14 PROCEDURE — 80053 COMPREHEN METABOLIC PANEL: CPT

## 2018-07-14 PROCEDURE — 84443 ASSAY THYROID STIM HORMONE: CPT

## 2018-07-14 PROCEDURE — 80061 LIPID PANEL: CPT

## 2018-07-14 PROCEDURE — 82306 VITAMIN D 25 HYDROXY: CPT

## 2018-07-14 NOTE — ASSESSMENT & PLAN NOTE
Started the last few weeks, now radiating around right side to back. Also felt some heart burn symptoms. Has not taken any antacid medication.  Has been using ibuprofen 800 mg once or twice per day for the past few weeks for knee pain. Denies fevers, bodyaches, chills, diarrhea, nausea/vomiting.

## 2018-07-14 NOTE — PROGRESS NOTES
"Devin Montgomery is a 48 y.o. male here for the following concerns and to establish care:    HPI:      RUQ pain  Started the last few weeks, now radiating around right side to back. Also felt some heart burn symptoms. Has not taken any antacid medication.  Has been using ibuprofen 800 mg once or twice per day for the past few weeks for knee pain. Denies fevers, bodyaches, chills, diarrhea, nausea/vomiting.     Current medicines (including changes today)  Current Outpatient Prescriptions   Medication Sig Dispense Refill   • ibuprofen (MOTRIN) 800 MG Tab Take 800 mg by mouth every 8 hours as needed.     • omeprazole (PRILOSEC) 20 MG delayed-release capsule Take 1 Cap by mouth every day. 30 Cap 0   • sucralfate (CARAFATE) 1 GM Tab Take 1 Tab by mouth 4 Times a Day,Before Meals and at Bedtime. 120 Tab 0     No current facility-administered medications for this visit.      He  has no past medical history on file.  He  has no past surgical history on file.  Social History   Substance Use Topics   • Smoking status: Former Smoker     Packs/day: 0.50     Years: 19.00     Types: Cigarettes     Quit date: 2000   • Smokeless tobacco: Never Used   • Alcohol use No     Social History     Social History Narrative   • No narrative on file     Family History   Problem Relation Age of Onset   • Family history unknown: Yes     No family status information on file.         ROS  No chest pain, no abdominal pain, no rash.  Positive ROS as per HPI.  All other systems reviewed and are negative      Objective:     Blood pressure 118/76, pulse 86, temperature 36.4 °C (97.6 °F), resp. rate 16, height 1.702 m (5' 7\"), weight 123.8 kg (273 lb), SpO2 96 %. Body mass index is 42.76 kg/m².     Physical Exam:    Constitutional: Alert, no distress.  Skin: Warm, dry, good turgor, no rashes in visible areas.  Eye: Equal, round and reactive, conjunctiva clear, lids normal.  ENMT: Lips without lesions, good dentition, oropharynx clear.  Neck: Trachea " midline, no masses, no thyromegaly. No cervical or supraclavicular lymphadenopathy.  Respiratory: Unlabored respiratory effort, lungs clear to auscultation, no wheezes, no ronchi.  Cardiovascular: Normal S1, S2, no murmur, no edema.  Abdomen: Soft, non-tender, no masses, no hepatosplenomegaly.  Psych: Alert and oriented x3, normal affect and mood.      Assessment and Plan:   The following treatment plan was discussed    1. Annual physical exam  Check labs, follow-up for annual  - Patient identified as having weight management issue.  Appropriate orders and counseling given.  - CBC WITH DIFFERENTIAL; Future  - COMP METABOLIC PANEL; Future  - HEMOGLOBIN A1C; Future  - LIPID PROFILE; Future  - TSH WITH REFLEX TO FT4; Future  - VITAMIN D,25 HYDROXY; Future  - PSA TOTAL + %FREE; Future    2. Epigastric pain  Unstable.  Gastritis versus GERD versus gallbladder issue  - omeprazole (PRILOSEC) 20 MG delayed-release capsule; Take 1 Cap by mouth every day.  Dispense: 30 Cap; Refill: 0  - sucralfate (CARAFATE) 1 GM Tab; Take 1 Tab by mouth 4 Times a Day,Before Meals and at Bedtime.  Dispense: 120 Tab; Refill: 0    3. Acute gastritis without hemorrhage, unspecified gastritis type  Unstable.  Begin Prilosec 20 mg once daily for 1 month   Begin sucralfate 1 g tablets dissolved in a few ounces of water 4 times daily for 1 month  Stop ibuprofen.  May use Tylenol as needed for pain and ice.  - omeprazole (PRILOSEC) 20 MG delayed-release capsule; Take 1 Cap by mouth every day.  Dispense: 30 Cap; Refill: 0  - sucralfate (CARAFATE) 1 GM Tab; Take 1 Tab by mouth 4 Times a Day,Before Meals and at Bedtime.  Dispense: 120 Tab; Refill: 0    4. Morbid obesity with BMI of 40.0-44.9, adult ([facility])  Check A1c  - HEMOGLOBIN A1C; Future      Followup: Return in about 4 weeks (around 8/10/2018) for Annual, Review Labs, gastritis.    I have placed the below orders and discussed them with an approved delegating provider. The MA is performing the below  orders under the direction of Dr. Landin

## 2018-07-16 LAB
PSA FREE MFR SERPL: 11 %
PSA FREE SERPL-MCNC: 0.1 NG/ML
PSA SERPL-MCNC: 0.9 NG/ML (ref 0–4)

## 2018-07-20 ENCOUNTER — TELEPHONE (OUTPATIENT)
Dept: MEDICAL GROUP | Facility: MEDICAL CENTER | Age: 48
End: 2018-07-20

## 2018-07-20 NOTE — TELEPHONE ENCOUNTER
----- Message from JEF Foy sent at 7/19/2018  5:50 PM PDT -----  Will discuss lab results at future appointment.   JEF Foy

## 2018-08-21 ENCOUNTER — OFFICE VISIT (OUTPATIENT)
Dept: MEDICAL GROUP | Facility: CLINIC | Age: 48
End: 2018-08-21
Payer: COMMERCIAL

## 2018-08-21 VITALS
DIASTOLIC BLOOD PRESSURE: 86 MMHG | TEMPERATURE: 98.6 F | BODY MASS INDEX: 38.77 KG/M2 | HEART RATE: 98 BPM | HEIGHT: 67 IN | RESPIRATION RATE: 18 BRPM | SYSTOLIC BLOOD PRESSURE: 126 MMHG | WEIGHT: 247 LBS | OXYGEN SATURATION: 94 %

## 2018-08-21 DIAGNOSIS — M17.12 OSTEOARTHROSIS, LOCALIZED, PRIMARY, KNEE, LEFT: ICD-10-CM

## 2018-08-21 DIAGNOSIS — M22.2X2 PATELLOFEMORAL SYNDROME, LEFT: ICD-10-CM

## 2018-08-21 PROCEDURE — 99213 OFFICE O/P EST LOW 20 MIN: CPT | Performed by: FAMILY MEDICINE

## 2018-08-21 NOTE — PROGRESS NOTES
"CHIEF COMPLAINT:  FOLLOW UP LEFT knee pain.     Present for approximately 1+ year  Rough play with a friend and valgus injury to the LEFT knee in early 2017   Pain is at the anterior, medial , lateral knee  Quality is aching  Improved with resting  Aggravated by going up ladders (works as a ) and squatting   Mechanical Symptom history: No Locking    REVIEW OF SYSTEMS  No Nausea, No Vomiting, No Chest Pain, No Shortness of Breath, No Headache, Dizziness  Which occurs with his seasonal allergies    PAST MEDICAL HISTORY:   History reviewed. No pertinent past medical history.    PMH:  has no past medical history on file.  MEDS:   Current Outpatient Prescriptions:   •  meloxicam (MOBIC) 7.5 MG Tab, Take 1 Tab by mouth every day. Do not use with Ibuprofen, Disp: 14 Tab, Rfl: 0  ALLERGIES: No Known Allergies  SURGHX: No past surgical history on file.  SOCHX:  reports that he has never smoked. He has never used smokeless tobacco. He reports that he does not drink alcohol or use drugs.  FH: Family history was reviewed, no pertinent findings to report     PHYSICAL EXAM:  /86   Pulse 98   Temp 37 °C (98.6 °F)   Resp 18   Ht 1.702 m (5' 7\")   Wt 112 kg (247 lb)   SpO2 94%   BMI 38.69 kg/m²      obese in no apparent distress, alert and oriented x 3.  Gait: antalgic     LEFT Knee:  Slight Varus and No Swelling   Range of Motion Slightly limited with Flexion  Trace effusion  Patellar Medial facet tenderness and Extensor mechanism intact  Medial Joint Line Non-tender and NEGATIVE Chrissy  Lateral Joint Line Tenderness and NEGATIVE Chrissy  Trace Laxity with Varus stress  Trace Laxity with Valgus stress  Lachman's testing is Trace  Posterior Drawer Testing is Trace  The leg is otherwise neurovascularly intact    Some relief in his pain with knee flexion while stabilizing his LEFT patella    Additional Findings: Tight hamstrings                                                 1. Osteoarthrosis, localized, " primary, knee, left     2. Patellofemoral syndrome, left        MILD effusion in the LEFT knee    FAILED Intra-articular LEFT knee corticosteroid injection performed (March 6, 2018)   FAILED PES BURSA INJECTION  FAILED Functional knee brace helps some  FAILED PT (2 months)    Works as a , up and down ladders all day  Try patellar stabilizaer brace for work  Felt better squatting with the patient in the office TODAY (brace fitted August 21, 2018)    Still pain at pes bursa region and lateral distal hamstring insertion  Tight hamstrings      Return in about 4 weeks (around 9/18/2018).  To see how he is coming along with patellar knee brace    Results for orders placed in visit on 02/22/18   DX-KNEE 3 VIEWS LEFT    Impression No evidence of acute fracture or dislocation.    Minimal degenerative changes.                                              Thank you JAG Zee for allowing me to participate in caring for your patient.

## 2018-08-24 ENCOUNTER — OFFICE VISIT (OUTPATIENT)
Dept: MEDICAL GROUP | Facility: MEDICAL CENTER | Age: 48
End: 2018-08-24
Payer: COMMERCIAL

## 2018-08-24 VITALS
HEART RATE: 84 BPM | WEIGHT: 273 LBS | OXYGEN SATURATION: 96 % | BODY MASS INDEX: 42.85 KG/M2 | SYSTOLIC BLOOD PRESSURE: 118 MMHG | HEIGHT: 67 IN | TEMPERATURE: 98.3 F | DIASTOLIC BLOOD PRESSURE: 74 MMHG | RESPIRATION RATE: 16 BRPM

## 2018-08-24 DIAGNOSIS — R73.03 PREDIABETES: ICD-10-CM

## 2018-08-24 DIAGNOSIS — E55.9 VITAMIN D DEFICIENCY: ICD-10-CM

## 2018-08-24 DIAGNOSIS — R10.13 EPIGASTRIC PAIN: ICD-10-CM

## 2018-08-24 PROCEDURE — 99214 OFFICE O/P EST MOD 30 MIN: CPT | Performed by: NURSE PRACTITIONER

## 2018-08-25 NOTE — ASSESSMENT & PLAN NOTE
Patient reports that he eats a moderate amount of carbohydrates and sugar.  He has not been exercising regularly.  Unknown family history.  Recent A1c 6.0.

## 2018-08-25 NOTE — ASSESSMENT & PLAN NOTE
Resolved.  Patient began drinking a cup of olive oil with lime juice every morning before food and symptoms resolved on their own.  He is no longer taking Carafate or omeprazole.

## 2018-08-25 NOTE — PROGRESS NOTES
"Subjective:   Devin Montgomery is a 48 y.o. male here today to review labs:    Prediabetes  Patient reports that he eats a moderate amount of carbohydrates and sugar.  He has not been exercising regularly.  Unknown family history.  Recent A1c 6.0.    Epigastric pain  Resolved.  Patient began drinking a cup of olive oil with lime juice every morning before food and symptoms resolved on their own.  He is no longer taking Carafate or omeprazole.       Current medicines (including changes today)  Current Outpatient Prescriptions   Medication Sig Dispense Refill   • ibuprofen (MOTRIN) 800 MG Tab Take 800 mg by mouth every 8 hours as needed.     • omeprazole (PRILOSEC) 20 MG delayed-release capsule Take 1 Cap by mouth every day. 30 Cap 0   • sucralfate (CARAFATE) 1 GM Tab Take 1 Tab by mouth 4 Times a Day,Before Meals and at Bedtime. 120 Tab 0     No current facility-administered medications for this visit.      He  has no past medical history on file.    ROS   No chest pain, no shortness of breath, no abdominal pain  Positive ROS as per HPI.  All other systems reviewed and are negative.     Objective:     Blood pressure 118/74, pulse 84, temperature 36.8 °C (98.3 °F), resp. rate 16, height 1.702 m (5' 7.01\"), weight 123.8 kg (273 lb), SpO2 96 %. Body mass index is 42.75 kg/m².     Physical Exam:  Constitutional: Alert, no distress.  Skin: Warm, dry, good turgor, no rashes in visible areas.  Eye: Equal, round and reactive, conjunctiva clear, lids normal.  ENMT: Lips without lesions, good dentition, oropharynx clear.  Neck: Trachea midline, no masses, no thyromegaly. No cervical or supraclavicular lymphadenopathy  Respiratory: Unlabored respiratory effort, lungs clear to auscultation, no wheezes, no ronchi.  Cardiovascular: Normal S1, S2, no murmur, no edema.  Abdomen: Soft, non-tender, no masses, no hepatosplenomegaly.  Psych: Alert and oriented x3, normal affect and mood.      Assessment and Plan:   The following treatment " plan was discussed    1. Prediabetes  Unstable.  Dietary and lifestyle modifications discussed in depth.  Information on prediabetes  attached in AVS.  Patient is very motivated to make lifestyle changes to prevent the development of diabetes.  He will begin reducing carbohydrate and sugar intake and begin regular exercise on his stationary bike daily.  Recheck A1c in 3 months.  - HEMOGLOBIN A1C; Future    2. Vitamin D deficiency  Unstable.  Begin vitamin D3 2000 international units daily.    3. Epigastric pain  Stable, resolved        Followup: Return in about 6 months (around 2/24/2019) for Prediabetes.    I have placed the below orders and discussed them with an approved delegating provider. The MA is performing the below orders under the direction of Dr. Landin           2

## 2018-09-21 ENCOUNTER — OFFICE VISIT (OUTPATIENT)
Dept: MEDICAL GROUP | Facility: CLINIC | Age: 48
End: 2018-09-21
Payer: COMMERCIAL

## 2018-09-21 VITALS
DIASTOLIC BLOOD PRESSURE: 78 MMHG | RESPIRATION RATE: 18 BRPM | SYSTOLIC BLOOD PRESSURE: 122 MMHG | HEART RATE: 68 BPM | OXYGEN SATURATION: 96 % | TEMPERATURE: 98.2 F | BODY MASS INDEX: 42.85 KG/M2 | HEIGHT: 67 IN | WEIGHT: 273 LBS

## 2018-09-21 DIAGNOSIS — M17.12 OSTEOARTHROSIS, LOCALIZED, PRIMARY, KNEE, LEFT: ICD-10-CM

## 2018-09-21 DIAGNOSIS — M62.89 HAMSTRING TIGHTNESS: ICD-10-CM

## 2018-09-21 PROCEDURE — 99213 OFFICE O/P EST LOW 20 MIN: CPT | Performed by: FAMILY MEDICINE

## 2018-09-21 NOTE — PROGRESS NOTES
"CHIEF COMPLAINT:  FOLLOW UP LEFT knee pain.     Present for approximately 1+ year  Rough play with a friend and valgus injury to the LEFT knee in early 2017   Pain is at the anterior, medial , lateral knee  Quality is aching  Improved with resting and he has also seen IMPROVEMENT with patellar stabilizer brace  Aggravated by going up ladders (works as a ) and squatting   Mechanical Symptom history: No Locking    REVIEW OF SYSTEMS  No Nausea, No Vomiting, No Chest Pain, No Shortness of Breath, No Headache, Dizziness  Which occurs with his seasonal allergies    PAST MEDICAL HISTORY:   History reviewed. No pertinent past medical history.    PMH:  has no past medical history on file.  MEDS:   Current Outpatient Prescriptions:   •  ibuprofen (MOTRIN) 800 MG Tab, Take 800 mg by mouth every 8 hours as needed., Disp: , Rfl:   •  omeprazole (PRILOSEC) 20 MG delayed-release capsule, Take 1 Cap by mouth every day., Disp: 30 Cap, Rfl: 0  •  sucralfate (CARAFATE) 1 GM Tab, Take 1 Tab by mouth 4 Times a Day,Before Meals and at Bedtime., Disp: 120 Tab, Rfl: 0  •  meloxicam (MOBIC) 7.5 MG Tab, Take 1 Tab by mouth every day. Do not use with Ibuprofen, Disp: 14 Tab, Rfl: 0  ALLERGIES: No Known Allergies  SURGHX: No past surgical history on file.  SOCHX:  reports that he quit smoking about 18 years ago. His smoking use included Cigarettes. He has a 9.50 pack-year smoking history. He has never used smokeless tobacco. He reports that he does not drink alcohol or use drugs.  FH: Family history was reviewed, no pertinent findings to report     PHYSICAL EXAM:  /78 (BP Location: Right arm, Patient Position: Sitting, BP Cuff Size: Adult)   Pulse 68   Temp 36.8 °C (98.2 °F) (Temporal)   Resp 18   Ht 1.702 m (5' 7.01\")   Wt 123.8 kg (273 lb)   SpO2 96%   BMI 42.75 kg/m²      obese in no apparent distress, alert and oriented x 3.  Gait: antalgic     LEFT Knee:  Slight Varus and No Swelling   Range of Motion Slightly limited " with Flexion  Trace effusion  Patellar Medial facet tenderness and Extensor mechanism intact  Medial Joint Line Non-tender and NEGATIVE Chrissy  Lateral Joint Line Tenderness and NEGATIVE Chrissy    Some relief in his pain with knee flexion while stabilizing his LEFT patella    Additional Findings: Tight hamstrings                                                 1. Osteoarthrosis, localized, primary, knee, left     2. Hamstring tightness          FAILED Intra-articular LEFT knee corticosteroid injection performed (March 6, 2018)   FAILED PES BURSA INJECTION  FAILED Functional knee brace helps some  FAILED PT (2 months)  IMPROVEMENT with patellar stabilizer brace    Works as a , up and down ladders all day  Try patellar stabilizaer brace for work    Still pain at pes bursa region and lateral distal hamstring insertion  Tight hamstrings which have improved with hamstring stretches    His wife recently had a gastric bypass so he has also been changing his diet which has helped him lose some weight, making him feel better    Return in about 6 weeks (around 11/2/2018).  To see how he is coming along with patellar knee brace and stretching routine.  Consider corticosteroid injection if symptoms persist or worsen      Results for orders placed in visit on 02/22/18   DX-KNEE 3 VIEWS LEFT    Impression No evidence of acute fracture or dislocation.    Minimal degenerative changes.                                              Thank you GARRETT Zee. for allowing me to participate in caring for your patient.

## 2019-03-17 NOTE — PATIENT INSTRUCTIONS
Patient still lethargic throughout the shift but is easily aroused and alert.  Patient using urinal in bed.  VS WDL.  No other complaints.    Prediabetes  Prediabetes is the condition of having a blood sugar (blood glucose) level that is higher than it should be, but not high enough for you to be diagnosed with type 2 diabetes. Having prediabetes puts you at risk for developing type 2 diabetes (type 2 diabetes mellitus). Prediabetes may be called impaired glucose tolerance or impaired fasting glucose.  Prediabetes usually does not cause symptoms. Your health care provider can diagnose this condition with blood tests. You may be tested for prediabetes if you are overweight and if you have at least one other risk factor for prediabetes.  Risk factors for prediabetes include:  · Having a family member with type 2 diabetes.  · Being overweight or obese.  · Being older than age 45.  · Being of American-, -American, /, or / descent.  · Having an inactive (sedentary) lifestyle.  · Having a history of gestational diabetes or polycystic ovarian syndrome (PCOS).  · Having low levels of good cholesterol (HDL-C) or high levels of blood fats (triglycerides).  · Having high blood pressure.  What is blood glucose and how is blood glucose measured?     Blood glucose refers to the amount of glucose in your bloodstream. Glucose comes from eating foods that contain sugars and starches (carbohydrates) that the body breaks down into glucose. Your blood glucose level may be measured in mg/dL (milligrams per deciliter) or mmol/L (millimoles per liter). Your blood glucose may be checked with one or more of the following blood tests:  · A fasting blood glucose (FBG) test. You will not be allowed to eat (you will fast) for at least 8 hours before a blood sample is taken.  ¨ A normal range for FBG is  mg/dl (3.9-5.6 mmol/L).  · An A1c (hemoglobin A1c) blood test. This test provides information about blood glucose control over the previous 2?3 months.  · An oral glucose tolerance test (OGTT). This test measures your blood glucose  twice:  ¨ After fasting. This is your baseline level.  ¨ Two hours after you drink a beverage that contains glucose.  You may be diagnosed with prediabetes:  · If your FBG is 100?125 mg/dL (5.6-6.9 mmol/L).  · If your A1c level is 5.7?6.4%.  · If your OGGT result is 140?199 mg/dL (7.8-11 mmol/L).  These blood tests may be repeated to confirm your diagnosis.  What happens if blood glucose is too high?  The pancreas produces a hormone (insulin) that helps move glucose from the bloodstream into cells. When cells in the body do not respond properly to insulin that the body makes (insulin resistance), excess glucose builds up in the blood instead of going into cells. As a result, high blood glucose (hyperglycemia) can develop, which can cause many complications. This is a symptom of prediabetes.  What can happen if blood glucose stays higher than normal for a long time?  Having high blood glucose for a long time is dangerous. Too much glucose in your blood can damage your nerves and blood vessels. Long-term damage can lead to complications from diabetes, which may include:  · Heart disease.  · Stroke.  · Blindness.  · Kidney disease.  · Depression.  · Poor circulation in the feet and legs, which could lead to surgical removal (amputation) in severe cases.  How can prediabetes be prevented from turning into type 2 diabetes?     To help prevent type 2 diabetes, take the following actions:  · Be physically active.  ¨ Do moderate-intensity physical activity for at least 30 minutes on at least 5 days of the week, or as much as told by your health care provider. This could be brisk walking, biking, or water aerobics.  ¨ Ask your health care provider what activities are safe for you. A mix of physical activities may be best, such as walking, swimming, cycling, and strength training.  · Lose weight as told by your health care provider.  ¨ Losing 5-7% of your body weight can reverse insulin resistance.  ¨ Your health care  provider can determine how much weight loss is best for you and can help you lose weight safely.  · Follow a healthy meal plan. This includes eating lean proteins, complex carbohydrates, fresh fruits and vegetables, low-fat dairy products, and healthy fats.  ¨ Follow instructions from your health care provider about eating or drinking restrictions.  ¨ Make an appointment to see a diet and nutrition specialist (registered dietitian) to help you create a healthy eating plan that is right for you.  · Do not smoke or use any tobacco products, such as cigarettes, chewing tobacco, and e-cigarettes. If you need help quitting, ask your health care provider.  · Take over-the-counter and prescription medicines as told by your health care provider. You may be prescribed medicines that help lower the risk of type 2 diabetes.  This information is not intended to replace advice given to you by your health care provider. Make sure you discuss any questions you have with your health care provider.  Document Released: 04/10/2017 Document Revised: 05/25/2017 Document Reviewed: 02/07/2017  [employer] Patient Education © 2017 [employer]

## 2019-03-25 ENCOUNTER — OFFICE VISIT (OUTPATIENT)
Dept: URGENT CARE | Facility: PHYSICIAN GROUP | Age: 49
End: 2019-03-25
Payer: COMMERCIAL

## 2019-03-25 VITALS
RESPIRATION RATE: 16 BRPM | WEIGHT: 273 LBS | BODY MASS INDEX: 42.85 KG/M2 | TEMPERATURE: 98.2 F | OXYGEN SATURATION: 98 % | HEART RATE: 96 BPM | SYSTOLIC BLOOD PRESSURE: 122 MMHG | HEIGHT: 67 IN | DIASTOLIC BLOOD PRESSURE: 88 MMHG

## 2019-03-25 DIAGNOSIS — J20.9 ACUTE BRONCHITIS, UNSPECIFIED ORGANISM: ICD-10-CM

## 2019-03-25 PROCEDURE — 99214 OFFICE O/P EST MOD 30 MIN: CPT | Performed by: PHYSICIAN ASSISTANT

## 2019-03-25 RX ORDER — DOXYCYCLINE HYCLATE 100 MG/1
100 CAPSULE ORAL 2 TIMES DAILY
Qty: 14 CAP | Refills: 0 | Status: SHIPPED | OUTPATIENT
Start: 2019-03-25 | End: 2019-04-01

## 2019-03-25 ASSESSMENT — ENCOUNTER SYMPTOMS
CHILLS: 0
SHORTNESS OF BREATH: 0
FEVER: 0
SPUTUM PRODUCTION: 1
PALPITATIONS: 0
HEMOPTYSIS: 0
SORE THROAT: 1
COUGH: 1
WHEEZING: 0

## 2019-03-26 NOTE — PROGRESS NOTES
Subjective:      Devin Sampson is a 49 y.o. male who presents with Cough (Chills, bodyaches x 3 days)            Cough   This is a new problem. The current episode started in the past 7 days. The problem has been unchanged. The cough is productive of sputum. Associated symptoms include a sore throat. Pertinent negatives include no chest pain, chills, ear pain, fever, hemoptysis, shortness of breath or wheezing. Nothing aggravates the symptoms. He has tried nothing for the symptoms.       Review of Systems   Constitutional: Positive for malaise/fatigue. Negative for chills and fever.   HENT: Positive for congestion and sore throat. Negative for ear pain.    Respiratory: Positive for cough and sputum production. Negative for hemoptysis, shortness of breath and wheezing.    Cardiovascular: Negative for chest pain and palpitations.   All other systems reviewed and are negative.    PMH:  has no past medical history on file.  MEDS:   Current Outpatient Prescriptions:   •  doxycycline (VIBRAMYCIN) 100 MG Cap, Take 1 Cap by mouth 2 times a day for 7 days., Disp: 14 Cap, Rfl: 0  •  Hydrocod Polst-CPM Polst ER (TUSSIONEX) 10-8 MG/5ML Suspension Extended Release, Take 5 mL by mouth every 12 hours for 7 days., Disp: 70 mL, Rfl: 0  •  ibuprofen (MOTRIN) 800 MG Tab, Take 800 mg by mouth every 8 hours as needed., Disp: , Rfl:   •  omeprazole (PRILOSEC) 20 MG delayed-release capsule, Take 1 Cap by mouth every day., Disp: 30 Cap, Rfl: 0  •  sucralfate (CARAFATE) 1 GM Tab, Take 1 Tab by mouth 4 Times a Day,Before Meals and at Bedtime., Disp: 120 Tab, Rfl: 0  •  meloxicam (MOBIC) 7.5 MG Tab, Take 1 Tab by mouth every day. Do not use with Ibuprofen, Disp: 14 Tab, Rfl: 0  ALLERGIES: No Known Allergies  SURGHX: No past surgical history on file.  SOCHX:  reports that he quit smoking about 19 years ago. His smoking use included Cigarettes. He has a 9.50 pack-year smoking history. He has never used smokeless tobacco. He  "reports that he does not drink alcohol or use drugs.  FH: Family history was reviewed, no pertinent findings to report  Medications, Allergies, and current problem list reviewed today in Epic     Objective:     /88   Pulse 96   Temp 36.8 °C (98.2 °F) (Temporal)   Resp 16   Ht 1.702 m (5' 7\")   Wt 123.8 kg (273 lb)   SpO2 98%   BMI 42.76 kg/m²      Physical Exam   Constitutional: He is oriented to person, place, and time. He appears well-developed and well-nourished. He is active.  Non-toxic appearance. He does not have a sickly appearance. He does not appear ill. No distress. He is not intubated.   HENT:   Head: Normocephalic and atraumatic.   Right Ear: Hearing, tympanic membrane, external ear and ear canal normal.   Left Ear: Hearing, tympanic membrane, external ear and ear canal normal.   Nose: Nose normal.   Mouth/Throat: Uvula is midline, oropharynx is clear and moist and mucous membranes are normal.   Eyes: Conjunctivae, EOM and lids are normal.   Neck: Normal range of motion and full passive range of motion without pain. Neck supple.   Cardiovascular: Normal rate, regular rhythm, S1 normal, S2 normal and normal heart sounds.  Exam reveals no gallop and no friction rub.    No murmur heard.  Pulmonary/Chest: Effort normal and breath sounds normal. No accessory muscle usage. No apnea, no tachypnea and no bradypnea. He is not intubated. No respiratory distress. He has no decreased breath sounds. He has no wheezes. He has no rhonchi. He has no rales. He exhibits no tenderness.   Musculoskeletal: Normal range of motion.   Neurological: He is alert and oriented to person, place, and time.   Skin: Skin is warm and dry.   Psychiatric: He has a normal mood and affect. His speech is normal and behavior is normal. Judgment and thought content normal.   Vitals reviewed.              Assessment/Plan:   Discussed likely viral etiology.  Contingent antibiotic prescription given to patient to fill upon meeting " criteria of guidelines discussed. ]    1. Acute bronchitis, unspecified organism    - doxycycline (VIBRAMYCIN) 100 MG Cap; Take 1 Cap by mouth 2 times a day for 7 days.  Dispense: 14 Cap; Refill: 0  - Hydrocod Polst-CPM Polst ER (TUSSIONEX) 10-8 MG/5ML Suspension Extended Release; Take 5 mL by mouth every 12 hours for 7 days.  Dispense: 70 mL; Refill: 0    Differential diagnosis, natural history, supportive care discussed. Follow-up with primary care provider within 7-10 days, emergency room precautions discussed.  Patient and/or family appears understanding of information.  Handout and review of patients diagnosis and treatment was discussed extensively.

## 2019-10-02 ENCOUNTER — OFFICE VISIT (OUTPATIENT)
Dept: URGENT CARE | Facility: PHYSICIAN GROUP | Age: 49
End: 2019-10-02
Payer: COMMERCIAL

## 2019-10-02 VITALS
OXYGEN SATURATION: 97 % | TEMPERATURE: 98 F | WEIGHT: 282 LBS | SYSTOLIC BLOOD PRESSURE: 132 MMHG | DIASTOLIC BLOOD PRESSURE: 88 MMHG | HEART RATE: 76 BPM | BODY MASS INDEX: 44.17 KG/M2 | RESPIRATION RATE: 16 BRPM

## 2019-10-02 DIAGNOSIS — M54.40 ACUTE BILATERAL LOW BACK PAIN WITH SCIATICA, SCIATICA LATERALITY UNSPECIFIED: ICD-10-CM

## 2019-10-02 LAB
APPEARANCE UR: CLEAR
BILIRUB UR STRIP-MCNC: NORMAL MG/DL
COLOR UR AUTO: YELLOW
GLUCOSE UR STRIP.AUTO-MCNC: NORMAL MG/DL
KETONES UR STRIP.AUTO-MCNC: NORMAL MG/DL
LEUKOCYTE ESTERASE UR QL STRIP.AUTO: NORMAL
NITRITE UR QL STRIP.AUTO: NORMAL
PH UR STRIP.AUTO: 7 [PH] (ref 5–8)
PROT UR QL STRIP: NORMAL MG/DL
RBC UR QL AUTO: NORMAL
SP GR UR STRIP.AUTO: 1.01
UROBILINOGEN UR STRIP-MCNC: 0.2 MG/DL

## 2019-10-02 PROCEDURE — 81002 URINALYSIS NONAUTO W/O SCOPE: CPT | Performed by: NURSE PRACTITIONER

## 2019-10-02 PROCEDURE — 99214 OFFICE O/P EST MOD 30 MIN: CPT | Performed by: NURSE PRACTITIONER

## 2019-10-02 RX ORDER — IBUPROFEN 800 MG/1
800 TABLET ORAL EVERY 8 HOURS PRN
Qty: 90 TAB | Refills: 0 | Status: SHIPPED | OUTPATIENT
Start: 2019-10-02 | End: 2019-11-22

## 2019-10-02 RX ORDER — CYCLOBENZAPRINE HCL 10 MG
10 TABLET ORAL
Qty: 20 TAB | Refills: 0 | Status: SHIPPED | OUTPATIENT
Start: 2019-10-02 | End: 2019-11-22

## 2019-10-02 ASSESSMENT — ENCOUNTER SYMPTOMS
WEAKNESS: 0
FOCAL WEAKNESS: 0
NAUSEA: 0
FEVER: 0
COUGH: 0
CHILLS: 0
SENSORY CHANGE: 1
TINGLING: 1
BACK PAIN: 1
ORTHOPNEA: 0
SHORTNESS OF BREATH: 0
VOMITING: 0

## 2019-10-03 NOTE — PROGRESS NOTES
Subjective:      Devin Sampson is a 49 y.o. male who presents with Back Pain ((lower midline) radiating down into the right and left glute x 1 week. No trauma. )            HPI New. 49 year old male with low back pain bilaterally with radiation into buttocks for one week. Denies bowel or bladder issues, abdominal pain or pelvic pain/paresthesia. Pain is moderate and worse over the past week. States burning sensation and sometimes stabbing nature. He has not taken any medication or done any treatment for this.   Patient has no known allergies.  Current Outpatient Medications on File Prior to Visit   Medication Sig Dispense Refill   • ibuprofen (MOTRIN) 800 MG Tab Take 800 mg by mouth every 8 hours as needed.     • omeprazole (PRILOSEC) 20 MG delayed-release capsule Take 1 Cap by mouth every day. 30 Cap 0   • sucralfate (CARAFATE) 1 GM Tab Take 1 Tab by mouth 4 Times a Day,Before Meals and at Bedtime. 120 Tab 0   • meloxicam (MOBIC) 7.5 MG Tab Take 1 Tab by mouth every day. Do not use with Ibuprofen 14 Tab 0     No current facility-administered medications on file prior to visit.      Social History     Socioeconomic History   • Marital status:      Spouse name: Not on file   • Number of children: Not on file   • Years of education: Not on file   • Highest education level: Not on file   Occupational History   • Not on file   Social Needs   • Financial resource strain: Not on file   • Food insecurity:     Worry: Not on file     Inability: Not on file   • Transportation needs:     Medical: Not on file     Non-medical: Not on file   Tobacco Use   • Smoking status: Former Smoker     Packs/day: 0.50     Years: 19.00     Pack years: 9.50     Types: Cigarettes     Last attempt to quit: 2000     Years since quittin.7   • Smokeless tobacco: Never Used   Substance and Sexual Activity   • Alcohol use: No   • Drug use: No   • Sexual activity: Yes     Partners: Female   Lifestyle   • Physical activity:      Days per week: Not on file     Minutes per session: Not on file   • Stress: Not on file   Relationships   • Social connections:     Talks on phone: Not on file     Gets together: Not on file     Attends Sabianism service: Not on file     Active member of club or organization: Not on file     Attends meetings of clubs or organizations: Not on file     Relationship status: Not on file   • Intimate partner violence:     Fear of current or ex partner: Not on file     Emotionally abused: Not on file     Physically abused: Not on file     Forced sexual activity: Not on file   Other Topics Concern   • Not on file   Social History Narrative    ** Merged History Encounter **          Family history is unknown by patient.    Review of Systems   Constitutional: Negative for chills and fever.   Respiratory: Negative for cough and shortness of breath.    Cardiovascular: Negative for chest pain and orthopnea.   Gastrointestinal: Negative for nausea and vomiting.   Genitourinary: Negative for dysuria.   Musculoskeletal: Positive for back pain.   Neurological: Positive for tingling and sensory change. Negative for focal weakness and weakness.          Objective:     /88   Pulse 76   Temp 36.7 °C (98 °F)   Resp 16   Wt (!) 127.9 kg (282 lb)   SpO2 97%   BMI 44.17 kg/m²      Physical Exam   Constitutional: He appears well-developed and well-nourished. No distress.   Cardiovascular: Normal rate, regular rhythm and normal heart sounds.   No murmur heard.  Pulmonary/Chest: Effort normal and breath sounds normal.   Musculoskeletal:        Right shoulder: He exhibits tenderness, pain and spasm. He exhibits normal range of motion and normal strength.        Lumbar back: He exhibits decreased range of motion, tenderness, pain and spasm. He exhibits no swelling.   Neurological: He is alert. No sensory deficit. He exhibits normal muscle tone. Gait normal.   Reflex Scores:       Patellar reflexes are 2+ on the right side and 2+  on the left side.  Nursing note and vitals reviewed.              Assessment/Plan:     1. Acute bilateral low back pain with sciatica, sciatica laterality unspecified  POCT Urinalysis    ibuprofen (MOTRIN) 800 MG Tab    cyclobenzaprine (FLEXERIL) 10 MG Tab    REFERRAL TO PHYSIATRY (PMR)    CANCELED: POCT Urinalysis     Stretching.  Ice/heat.  Proper body mechanics at work.  Weight loss.  Differential diagnosis, natural history, supportive care, and indications for immediate follow-up discussed at length.

## 2019-11-21 ENCOUNTER — APPOINTMENT (OUTPATIENT)
Dept: MEDICAL GROUP | Facility: MEDICAL CENTER | Age: 49
End: 2019-11-21
Payer: COMMERCIAL

## 2019-11-22 ENCOUNTER — OFFICE VISIT (OUTPATIENT)
Dept: MEDICAL GROUP | Facility: MEDICAL CENTER | Age: 49
End: 2019-11-22
Payer: COMMERCIAL

## 2019-11-22 VITALS
SYSTOLIC BLOOD PRESSURE: 112 MMHG | DIASTOLIC BLOOD PRESSURE: 74 MMHG | HEART RATE: 72 BPM | WEIGHT: 285.06 LBS | BODY MASS INDEX: 44.74 KG/M2 | TEMPERATURE: 98.5 F | HEIGHT: 67 IN | OXYGEN SATURATION: 97 %

## 2019-11-22 DIAGNOSIS — Z12.12 SCREENING FOR COLORECTAL CANCER: ICD-10-CM

## 2019-11-22 DIAGNOSIS — E55.9 VITAMIN D DEFICIENCY: ICD-10-CM

## 2019-11-22 DIAGNOSIS — Z23 NEED FOR VACCINATION: ICD-10-CM

## 2019-11-22 DIAGNOSIS — J30.9 ALLERGIC SINUSITIS: ICD-10-CM

## 2019-11-22 DIAGNOSIS — E66.01 MORBID OBESITY WITH BMI OF 40.0-44.9, ADULT (HCC): ICD-10-CM

## 2019-11-22 DIAGNOSIS — Z12.11 SCREENING FOR COLORECTAL CANCER: ICD-10-CM

## 2019-11-22 DIAGNOSIS — R73.03 PREDIABETES: ICD-10-CM

## 2019-11-22 PROBLEM — R10.13 EPIGASTRIC PAIN: Status: RESOLVED | Noted: 2018-07-13 | Resolved: 2019-11-22

## 2019-11-22 PROCEDURE — 90471 IMMUNIZATION ADMIN: CPT | Performed by: FAMILY MEDICINE

## 2019-11-22 PROCEDURE — 99214 OFFICE O/P EST MOD 30 MIN: CPT | Mod: 25 | Performed by: FAMILY MEDICINE

## 2019-11-22 PROCEDURE — 90686 IIV4 VACC NO PRSV 0.5 ML IM: CPT | Performed by: FAMILY MEDICINE

## 2019-11-22 ASSESSMENT — PATIENT HEALTH QUESTIONNAIRE - PHQ9: CLINICAL INTERPRETATION OF PHQ2 SCORE: 0

## 2019-11-22 NOTE — PROGRESS NOTES
Subjective:     CC: Diagnoses of Vitamin D deficiency, Prediabetes, Morbid obesity with BMI of 40.0-44.9, adult (HCC), Allergic sinusitis, Need for vaccination, and Screening for colorectal cancer were pertinent to this visit.    HPI: Patient is a 49 y.o. male established patient who presents today to [facility] care.  The patient works as a .  He is interested in a referral to bariatric surgery.      Vitamin D deficiency  Chronic problem. Not currently taking vit D.  His vitamin D level was 14 in 2018.  He forgets to take it.    Prediabetes  Chronic problem. Last A1C was 6.0 on 2018.  The patient has gained 13 pounds over the past 6 months.  He reports a high carb diet.  However, he is interested in weight loss.    Morbid obesity with BMI of 40.0-44.9, adult (HCC)  Chronic problem. BMI 44. Has been gainging weight consistenly since March.   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   He has a pretty high carb diet.  Reports healthy breakfast.   Eats out 1-2 times per week for lunch, otherwise brings a sandwich and snacks.  Likes to snack throughout the day  Very much likes beans/rice/bread.     Allergic sinusitis  Chronic problem.  The patient had sinus surgery done a couple years back with Dr. Rae Haines. Notes improvement, however, continues to have congestion, due to allergies.      History reviewed. No pertinent past medical history.    Social History     Tobacco Use   • Smoking status: Former Smoker     Packs/day: 0.50     Years: 19.00     Pack years: 9.50     Types: Cigarettes     Last attempt to quit: 2000     Years since quittin.9   • Smokeless tobacco: Never Used   Substance Use Topics   • Alcohol use: No   • Drug use: No       Current Outpatient Medications Ordered in Epic   Medication Sig Dispense Refill   • ibuprofen (MOTRIN) 800 MG Tab Take 800 mg by mouth every 8 hours as needed.       No current Epic-ordered facility-administered medications on  "file.        Allergies:  Patient has no known allergies.    Health Maintenance: Completed  Due for colonoscopy, referral was placed.    ROS:  Pulm: no sob, no cough  CV: no chest pain, no palpitations      Objective:       Exam:  /74 (BP Location: Left arm, Patient Position: Sitting, BP Cuff Size: Large adult)   Pulse 72   Temp 36.9 °C (98.5 °F) (Temporal)   Ht 1.702 m (5' 7\")   Wt (!) 129.3 kg (285 lb 0.9 oz)   SpO2 97%   BMI 44.65 kg/m²  Body mass index is 44.65 kg/m².      General: Normal appearing. No distress.  HEAD: NCAT  EYES: conjunctiva clear, lids without ptosis, pupils equal  and reactive to light  EARS: ears normal shape and contour, canals are clear bilaterally, TMs clear  MOUTH: oropharynx is without erythema, edema or exudates.   Neck: Supple without masses. Thyroid is not enlarged. Normal ROM  Pulmonary: Clear to ausculation.  Normal effort. No rales, ronchi, or wheezing.  Cardiovascular: Regular rate and rhythm, no murmur. No LE edema  Neurologic: Grossly normal, no focal deficits  Lymph: No cervical or supraclavicular lymph nodes are palpable  Skin: Warm and dry.  No obvious lesions.  Musculoskeletal: Normal gait and station.   Psych: Normal mood and affect. Alert and oriented x3. Judgment and insight is normal.       Labs: 7/14/2018 results reviewed and discussed with the patient, questions answered.    Assessment & Plan:     49 y.o. male with the following -     1. Vitamin D deficiency  Chronic problem, new to discuss.  Encourage patient to start taking vitamin D.  His last vitamin D level was 14 in July 2018.  He reports that he will start taking supplemental vitamin D.    2. Prediabetes  Chronic problem, new to discuss.  Last A1c in July 2018 was 6.0.  He has been gaining weight since then.  Repeat labs ordered.  Discussed diet and exercise today.  Referral placed to bariatric surgery.  - HEMOGLOBIN A1C; Future  - Comp Metabolic Panel; Future  - Lipid Profile; Future    3. Morbid " obesity with BMI of 40.0-44.9, adult (HCC)  Chronic problem, new to discuss.  Discussed diet and exercise at length today.  Referral placed for bariatric surgery consultation.  - REFERRAL TO BARIATRIC SURGERY  - Comp Metabolic Panel; Future  - Lipid Profile; Future    4. Allergic sinusitis  Chronic problem.  The patient is status post sinus surgery with Dr. Haines.  However, he continues to have allergic sinusitis.  I advised Flonase.    5. Need for vaccination  - Influenza Vaccine Quad Injection (PF)    6. Screening for colorectal cancer  - REFERRAL TO GI FOR COLONOSCOPY      Return if symptoms worsen or fail to improve.    Please note that this dictation was created using voice recognition software. I have made every reasonable attempt to correct obvious errors, but I expect that there are errors of grammar and possibly content that I did not discover before finalizing the note.

## 2019-11-22 NOTE — ASSESSMENT & PLAN NOTE
Chronic problem. BMI 44. Has been gainging weight consistenly since March.   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   Reports healthy breakfast.   Eats out 1-2 times per month  Likes to snack throughout the day  Very much likes beans/rice/bread.

## 2019-11-23 ENCOUNTER — HOSPITAL ENCOUNTER (OUTPATIENT)
Dept: LAB | Facility: MEDICAL CENTER | Age: 49
End: 2019-11-23
Attending: FAMILY MEDICINE
Payer: COMMERCIAL

## 2019-11-23 DIAGNOSIS — E66.01 MORBID OBESITY WITH BMI OF 40.0-44.9, ADULT (HCC): ICD-10-CM

## 2019-11-23 DIAGNOSIS — R73.03 PREDIABETES: ICD-10-CM

## 2019-11-23 LAB
ALBUMIN SERPL BCP-MCNC: 3.9 G/DL (ref 3.2–4.9)
ALBUMIN/GLOB SERPL: 1.4 G/DL
ALP SERPL-CCNC: 63 U/L (ref 30–99)
ALT SERPL-CCNC: 31 U/L (ref 2–50)
ANION GAP SERPL CALC-SCNC: 8 MMOL/L (ref 0–11.9)
AST SERPL-CCNC: 21 U/L (ref 12–45)
BILIRUB SERPL-MCNC: 0.6 MG/DL (ref 0.1–1.5)
BUN SERPL-MCNC: 16 MG/DL (ref 8–22)
CALCIUM SERPL-MCNC: 8.9 MG/DL (ref 8.5–10.5)
CHLORIDE SERPL-SCNC: 108 MMOL/L (ref 96–112)
CHOLEST SERPL-MCNC: 180 MG/DL (ref 100–199)
CO2 SERPL-SCNC: 23 MMOL/L (ref 20–33)
CREAT SERPL-MCNC: 0.83 MG/DL (ref 0.5–1.4)
EST. AVERAGE GLUCOSE BLD GHB EST-MCNC: 126 MG/DL
GLOBULIN SER CALC-MCNC: 2.8 G/DL (ref 1.9–3.5)
GLUCOSE SERPL-MCNC: 81 MG/DL (ref 65–99)
HBA1C MFR BLD: 6 % (ref 0–5.6)
HDLC SERPL-MCNC: 44 MG/DL
LDLC SERPL CALC-MCNC: 100 MG/DL
POTASSIUM SERPL-SCNC: 4.2 MMOL/L (ref 3.6–5.5)
PROT SERPL-MCNC: 6.7 G/DL (ref 6–8.2)
SODIUM SERPL-SCNC: 139 MMOL/L (ref 135–145)
TRIGL SERPL-MCNC: 181 MG/DL (ref 0–149)

## 2019-11-23 PROCEDURE — 80061 LIPID PANEL: CPT

## 2019-11-23 PROCEDURE — 83036 HEMOGLOBIN GLYCOSYLATED A1C: CPT

## 2019-11-23 PROCEDURE — 80053 COMPREHEN METABOLIC PANEL: CPT

## 2019-11-23 PROCEDURE — 36415 COLL VENOUS BLD VENIPUNCTURE: CPT

## 2019-11-25 ENCOUNTER — TELEPHONE (OUTPATIENT)
Dept: MEDICAL GROUP | Facility: MEDICAL CENTER | Age: 49
End: 2019-11-25

## 2019-11-25 NOTE — TELEPHONE ENCOUNTER
----- Message from Irasema Alvarez M.D. sent at 11/25/2019 10:03 AM PST -----  Hi,   Can you please call the patient to let him know that we received his lab results. His A1C is unchanged since last year, however, his cholesterol is slightly worse. This should improve with weight loss. If he has any questions he can schedule an appt.   Thanks,   Dr. Alvarez

## 2019-11-25 NOTE — TELEPHONE ENCOUNTER
Phone Number Called: [phone] (home)     Call outcome: spoke to patient regarding message below    Message: Lab results

## 2020-06-22 ENCOUNTER — HOSPITAL ENCOUNTER (OUTPATIENT)
Dept: CARDIOLOGY | Facility: MEDICAL CENTER | Age: 50
End: 2020-06-22
Attending: COLON & RECTAL SURGERY
Payer: COMMERCIAL

## 2020-06-22 ENCOUNTER — HOSPITAL ENCOUNTER (OUTPATIENT)
Dept: RADIOLOGY | Facility: MEDICAL CENTER | Age: 50
End: 2020-06-22
Attending: COLON & RECTAL SURGERY
Payer: COMMERCIAL

## 2020-06-22 DIAGNOSIS — E66.01 MORBID OBESITY (HCC): ICD-10-CM

## 2020-06-22 DIAGNOSIS — Z01.818 OTHER SPECIFIED PRE-OPERATIVE EXAMINATION: ICD-10-CM

## 2020-06-22 LAB — EKG IMPRESSION: NORMAL

## 2020-06-22 PROCEDURE — 93005 ELECTROCARDIOGRAM TRACING: CPT | Performed by: COLON & RECTAL SURGERY

## 2020-06-22 PROCEDURE — 71046 X-RAY EXAM CHEST 2 VIEWS: CPT

## 2020-06-22 PROCEDURE — 74240 X-RAY XM UPR GI TRC 1CNTRST: CPT

## 2020-06-22 PROCEDURE — 93010 ELECTROCARDIOGRAM REPORT: CPT | Performed by: INTERNAL MEDICINE

## 2020-07-10 ENCOUNTER — HOSPITAL ENCOUNTER (OUTPATIENT)
Dept: LAB | Facility: MEDICAL CENTER | Age: 50
End: 2020-07-10
Payer: COMMERCIAL

## 2020-07-10 ENCOUNTER — OFFICE VISIT (OUTPATIENT)
Dept: MEDICAL GROUP | Facility: MEDICAL CENTER | Age: 50
End: 2020-07-10
Payer: COMMERCIAL

## 2020-07-10 VITALS
DIASTOLIC BLOOD PRESSURE: 64 MMHG | WEIGHT: 287 LBS | OXYGEN SATURATION: 95 % | HEIGHT: 67 IN | TEMPERATURE: 97.4 F | BODY MASS INDEX: 45.04 KG/M2 | SYSTOLIC BLOOD PRESSURE: 130 MMHG | HEART RATE: 85 BPM

## 2020-07-10 DIAGNOSIS — E66.01 MORBID OBESITY WITH BMI OF 40.0-44.9, ADULT (HCC): ICD-10-CM

## 2020-07-10 DIAGNOSIS — R73.03 PREDIABETES: ICD-10-CM

## 2020-07-10 DIAGNOSIS — R20.2 TINGLING: ICD-10-CM

## 2020-07-10 LAB
HBA1C MFR BLD: 5.8 % (ref 0–5.6)
INT CON NEG: NEGATIVE
INT CON POS: POSITIVE

## 2020-07-10 PROCEDURE — 83036 HEMOGLOBIN GLYCOSYLATED A1C: CPT | Performed by: FAMILY MEDICINE

## 2020-07-10 PROCEDURE — 99214 OFFICE O/P EST MOD 30 MIN: CPT | Performed by: FAMILY MEDICINE

## 2020-07-10 ASSESSMENT — PATIENT HEALTH QUESTIONNAIRE - PHQ9: CLINICAL INTERPRETATION OF PHQ2 SCORE: 0

## 2020-07-10 ASSESSMENT — FIBROSIS 4 INDEX: FIB4 SCORE: 0.57

## 2020-07-10 NOTE — ASSESSMENT & PLAN NOTE
New problem.  Fingers 4 and 5 bilaterally  Also with some toes  Worst at night  No symptoms today  Comes and goes  Then started leana the thumb and forefinger. Sleeps with arms foled  worset at around 2 am

## 2020-07-10 NOTE — PROGRESS NOTES
Subjective:     CC: Diagnoses of Tingling, Morbid obesity with BMI of 40.0-44.9, adult (HCC), and Prediabetes were pertinent to this visit.    HPI: Patient is a 50 y.o. male established patient who presents today with concern regarding tingling, prediabtes and obesity.       Tingling  New problem.  Fingers 4 and 5 bilaterally  Also with some toes  Worst at night  No symptoms today  Comes and goes  Then started leana the thumb and forefinger. Sleeps with arms foled  worset at around 2 am      Morbid obesity with BMI of 40.0-44.9, adult (HCC)  Chronic problem.  Up another 2 pounds since last visit.  He has started the process of gastric bypass surgery with Dr. Hayward. They are in the process of getting labs done and getting through the classes.    Prediabetes  Chronic problem.  Last A1c was 6.0.  Today A1c was 5.8%.  Up in weight.  BMI is 44.9.      History reviewed. No pertinent past medical history.    Social History     Tobacco Use   • Smoking status: Former Smoker     Packs/day: 0.50     Years: 19.00     Pack years: 9.50     Types: Cigarettes     Last attempt to quit: 2000     Years since quittin.5   • Smokeless tobacco: Never Used   Substance Use Topics   • Alcohol use: No   • Drug use: No       Current Outpatient Medications Ordered in Epic   Medication Sig Dispense Refill   • ibuprofen (MOTRIN) 800 MG Tab Take 800 mg by mouth every 8 hours as needed.       No current Epic-ordered facility-administered medications on file.        Allergies:  Patient has no known allergies.    Health Maintenance: Completed    ROS:  Gen: no fevers/chill, no changes in weight  Eyes: no changes in vision  ENT: no sore throat, no hearing loss, no bloody nose  Pulm: no sob, no cough  CV: no chest pain, no palpitations  GI: no nausea/vomiting, no diarrhea  : no dysuria  MSk: no myalgias  Skin: no rash  Neuro: no headaches, no numbness/tingling  Heme/Lymph: no easy bruising      Objective:       Exam:  /64 (BP Location: Right  "arm, Patient Position: Sitting, BP Cuff Size: Adult long)   Pulse 85   Temp 36.3 °C (97.4 °F) (Temporal)   Ht 1.702 m (5' 7\")   Wt (!) 130.2 kg (287 lb)   SpO2 95%   BMI 44.95 kg/m²  Body mass index is 44.95 kg/m².    General: Normal appearing. No distress.  HEAD: NCAT  EYES: conjunctiva clear, lids without ptosis, pupils equal and reactive to light  EARS: ears normal shape and contour.  MOUTH: normal dentition   Neck:  Normal ROM  Pulmonary: Normal effort. Normal respiratory rate.  Cardiovascular: Well perfused. No LE edema  Neurologic: Grossly normal, no focal deficits.  Normal sensation bilateral hands and legs.  Normal strength.  Skin: Warm and dry.  No obvious lesions.  Musculoskeletal: Normal gait and station.   Psych: Normal mood and affect. Alert and oriented x3. Judgment and insight is normal.    Labs: 11/23/2019 results reviewed and discussed with the patient, questions answered.    Assessment & Plan:     50 y.o. male with the following -     1. Tingling  New problem.  Occurs mostly at night.  Likely positional, possibly due to weight.  Normal sensation and strength today.  A1c 5.8%.  Order the following labs to rule out other etiology.  - Comp Metabolic Panel; Future  - VITAMIN B12; Future  - CBC WITH DIFFERENTIAL; Future  - POCT Hemoglobin A1C    2. Morbid obesity with BMI of 40.0-44.9, adult (HCC)  Chronic problem, uncontrolled.  The patient is now working with Dr. Hayward, in the process of getting lab work and going to the classes as needed.  - Comp Metabolic Panel; Future    3. Prediabetes  Chronic problem.  A1c 5.8% today.  Discussed diet and exercise.  He is planning to go through with gastric bypass surgery.    Plan to follow-up if tingling becomes persistent or if labs are abnormal.    Please note that this dictation was created using voice recognition software. I have made every reasonable attempt to correct obvious errors, but I expect that there are errors of grammar and possibly content " that I did not discover before finalizing the note.

## 2020-07-10 NOTE — ASSESSMENT & PLAN NOTE
Chronic problem.  Up another 2 pounds since last visit.  He has started the process of gastric bypass surgery with Dr. Hayward. They are in the process of getting labs done and getting through the classes.

## 2020-07-14 ENCOUNTER — HOSPITAL ENCOUNTER (OUTPATIENT)
Dept: LAB | Facility: MEDICAL CENTER | Age: 50
End: 2020-07-14
Attending: FAMILY MEDICINE
Payer: COMMERCIAL

## 2020-07-14 DIAGNOSIS — E66.01 MORBID OBESITY WITH BMI OF 40.0-44.9, ADULT (HCC): ICD-10-CM

## 2020-07-14 DIAGNOSIS — R20.2 TINGLING: ICD-10-CM

## 2020-07-14 LAB
ALBUMIN SERPL BCP-MCNC: 4.1 G/DL (ref 3.2–4.9)
ALBUMIN/GLOB SERPL: 1.5 G/DL
ALP SERPL-CCNC: 66 U/L (ref 30–99)
ALT SERPL-CCNC: 29 U/L (ref 2–50)
ANION GAP SERPL CALC-SCNC: 11 MMOL/L (ref 7–16)
AST SERPL-CCNC: 19 U/L (ref 12–45)
BASOPHILS # BLD AUTO: 0.8 % (ref 0–1.8)
BASOPHILS # BLD: 0.06 K/UL (ref 0–0.12)
BILIRUB SERPL-MCNC: 0.4 MG/DL (ref 0.1–1.5)
BUN SERPL-MCNC: 16 MG/DL (ref 8–22)
CALCIUM SERPL-MCNC: 9 MG/DL (ref 8.5–10.5)
CHLORIDE SERPL-SCNC: 106 MMOL/L (ref 96–112)
CO2 SERPL-SCNC: 22 MMOL/L (ref 20–33)
CREAT SERPL-MCNC: 0.83 MG/DL (ref 0.5–1.4)
EOSINOPHIL # BLD AUTO: 0.17 K/UL (ref 0–0.51)
EOSINOPHIL NFR BLD: 2.3 % (ref 0–6.9)
ERYTHROCYTE [DISTWIDTH] IN BLOOD BY AUTOMATED COUNT: 46.2 FL (ref 35.9–50)
FASTING STATUS PATIENT QL REPORTED: NORMAL
GLOBULIN SER CALC-MCNC: 2.7 G/DL (ref 1.9–3.5)
GLUCOSE SERPL-MCNC: 91 MG/DL (ref 65–99)
HCT VFR BLD AUTO: 45.1 % (ref 42–52)
HGB BLD-MCNC: 14.6 G/DL (ref 14–18)
IMM GRANULOCYTES # BLD AUTO: 0.03 K/UL (ref 0–0.11)
IMM GRANULOCYTES NFR BLD AUTO: 0.4 % (ref 0–0.9)
LYMPHOCYTES # BLD AUTO: 3.08 K/UL (ref 1–4.8)
LYMPHOCYTES NFR BLD: 42.5 % (ref 22–41)
MCH RBC QN AUTO: 31.4 PG (ref 27–33)
MCHC RBC AUTO-ENTMCNC: 32.4 G/DL (ref 33.7–35.3)
MCV RBC AUTO: 97 FL (ref 81.4–97.8)
MONOCYTES # BLD AUTO: 0.64 K/UL (ref 0–0.85)
MONOCYTES NFR BLD AUTO: 8.8 % (ref 0–13.4)
NEUTROPHILS # BLD AUTO: 3.27 K/UL (ref 1.82–7.42)
NEUTROPHILS NFR BLD: 45.2 % (ref 44–72)
NRBC # BLD AUTO: 0 K/UL
NRBC BLD-RTO: 0 /100 WBC
PLATELET # BLD AUTO: 309 K/UL (ref 164–446)
PMV BLD AUTO: 10.6 FL (ref 9–12.9)
POTASSIUM SERPL-SCNC: 4 MMOL/L (ref 3.6–5.5)
PROT SERPL-MCNC: 6.8 G/DL (ref 6–8.2)
RBC # BLD AUTO: 4.65 M/UL (ref 4.7–6.1)
SODIUM SERPL-SCNC: 139 MMOL/L (ref 135–145)
VIT B12 SERPL-MCNC: 629 PG/ML (ref 211–911)
WBC # BLD AUTO: 7.3 K/UL (ref 4.8–10.8)

## 2020-07-14 PROCEDURE — 85025 COMPLETE CBC W/AUTO DIFF WBC: CPT

## 2020-07-14 PROCEDURE — 36415 COLL VENOUS BLD VENIPUNCTURE: CPT

## 2020-07-14 PROCEDURE — 82607 VITAMIN B-12: CPT

## 2020-07-14 PROCEDURE — 80053 COMPREHEN METABOLIC PANEL: CPT

## 2020-07-18 ENCOUNTER — HOSPITAL ENCOUNTER (OUTPATIENT)
Dept: LAB | Facility: MEDICAL CENTER | Age: 50
End: 2020-07-18
Attending: COLON & RECTAL SURGERY
Payer: COMMERCIAL

## 2020-07-18 LAB — TSH SERPL DL<=0.005 MIU/L-ACNC: 1.46 UIU/ML (ref 0.38–5.33)

## 2020-07-18 PROCEDURE — 84443 ASSAY THYROID STIM HORMONE: CPT

## 2020-07-18 PROCEDURE — 36415 COLL VENOUS BLD VENIPUNCTURE: CPT

## 2020-08-28 ENCOUNTER — PRE-ADMISSION TESTING (OUTPATIENT)
Dept: ADMISSIONS | Facility: MEDICAL CENTER | Age: 50
DRG: 621 | End: 2020-08-28
Attending: COLON & RECTAL SURGERY
Payer: COMMERCIAL

## 2020-08-28 DIAGNOSIS — Z01.812 PRE-OPERATIVE LABORATORY EXAMINATION: ICD-10-CM

## 2020-08-28 LAB
ALBUMIN SERPL BCP-MCNC: 4.6 G/DL (ref 3.2–4.9)
ALBUMIN/GLOB SERPL: 1.8 G/DL
ALP SERPL-CCNC: 79 U/L (ref 30–99)
ALT SERPL-CCNC: 36 U/L (ref 2–50)
ANION GAP SERPL CALC-SCNC: 13 MMOL/L (ref 7–16)
AST SERPL-CCNC: 18 U/L (ref 12–45)
BASOPHILS # BLD AUTO: 0.6 % (ref 0–1.8)
BASOPHILS # BLD: 0.06 K/UL (ref 0–0.12)
BILIRUB SERPL-MCNC: 0.6 MG/DL (ref 0.1–1.5)
BUN SERPL-MCNC: 14 MG/DL (ref 8–22)
CALCIUM SERPL-MCNC: 9.8 MG/DL (ref 8.5–10.5)
CHLORIDE SERPL-SCNC: 104 MMOL/L (ref 96–112)
CO2 SERPL-SCNC: 23 MMOL/L (ref 20–33)
COVID ORDER STATUS COVID19: NORMAL
CREAT SERPL-MCNC: 0.71 MG/DL (ref 0.5–1.4)
EOSINOPHIL # BLD AUTO: 0.15 K/UL (ref 0–0.51)
EOSINOPHIL NFR BLD: 1.4 % (ref 0–6.9)
ERYTHROCYTE [DISTWIDTH] IN BLOOD BY AUTOMATED COUNT: 44.9 FL (ref 35.9–50)
GLOBULIN SER CALC-MCNC: 2.6 G/DL (ref 1.9–3.5)
GLUCOSE SERPL-MCNC: 93 MG/DL (ref 65–99)
HCT VFR BLD AUTO: 44.9 % (ref 42–52)
HGB BLD-MCNC: 15.3 G/DL (ref 14–18)
IMM GRANULOCYTES # BLD AUTO: 0.04 K/UL (ref 0–0.11)
IMM GRANULOCYTES NFR BLD AUTO: 0.4 % (ref 0–0.9)
INR PPP: 0.98 (ref 0.87–1.13)
LYMPHOCYTES # BLD AUTO: 3.33 K/UL (ref 1–4.8)
LYMPHOCYTES NFR BLD: 31.2 % (ref 22–41)
MCH RBC QN AUTO: 31.9 PG (ref 27–33)
MCHC RBC AUTO-ENTMCNC: 34.1 G/DL (ref 33.7–35.3)
MCV RBC AUTO: 93.5 FL (ref 81.4–97.8)
MONOCYTES # BLD AUTO: 0.78 K/UL (ref 0–0.85)
MONOCYTES NFR BLD AUTO: 7.3 % (ref 0–13.4)
NEUTROPHILS # BLD AUTO: 6.33 K/UL (ref 1.82–7.42)
NEUTROPHILS NFR BLD: 59.1 % (ref 44–72)
NRBC # BLD AUTO: 0 K/UL
NRBC BLD-RTO: 0 /100 WBC
PLATELET # BLD AUTO: 325 K/UL (ref 164–446)
PMV BLD AUTO: 9.5 FL (ref 9–12.9)
POTASSIUM SERPL-SCNC: 5 MMOL/L (ref 3.6–5.5)
PROT SERPL-MCNC: 7.2 G/DL (ref 6–8.2)
PROTHROMBIN TIME: 13.3 SEC (ref 12–14.6)
RBC # BLD AUTO: 4.8 M/UL (ref 4.7–6.1)
SARS-COV-2 RNA RESP QL NAA+PROBE: NOTDETECTED
SODIUM SERPL-SCNC: 140 MMOL/L (ref 135–145)
SPECIMEN SOURCE: NORMAL
WBC # BLD AUTO: 10.7 K/UL (ref 4.8–10.8)

## 2020-08-28 PROCEDURE — U0003 INFECTIOUS AGENT DETECTION BY NUCLEIC ACID (DNA OR RNA); SEVERE ACUTE RESPIRATORY SYNDROME CORONAVIRUS 2 (SARS-COV-2) (CORONAVIRUS DISEASE [COVID-19]), AMPLIFIED PROBE TECHNIQUE, MAKING USE OF HIGH THROUGHPUT TECHNOLOGIES AS DESCRIBED BY CMS-2020-01-R: HCPCS

## 2020-08-28 PROCEDURE — 83036 HEMOGLOBIN GLYCOSYLATED A1C: CPT

## 2020-08-28 PROCEDURE — 80053 COMPREHEN METABOLIC PANEL: CPT

## 2020-08-28 PROCEDURE — 85025 COMPLETE CBC W/AUTO DIFF WBC: CPT

## 2020-08-28 PROCEDURE — 85610 PROTHROMBIN TIME: CPT

## 2020-08-28 RX ORDER — MULTIVIT WITH MINERALS/LUTEIN
1 TABLET ORAL DAILY
COMMUNITY
End: 2022-11-28

## 2020-08-28 ASSESSMENT — FIBROSIS 4 INDEX: FIB4 SCORE: 0.58

## 2020-08-29 LAB
EST. AVERAGE GLUCOSE BLD GHB EST-MCNC: 105 MG/DL
HBA1C MFR BLD: 5.3 % (ref 0–5.6)

## 2020-09-02 ENCOUNTER — ANESTHESIA (OUTPATIENT)
Dept: SURGERY | Facility: MEDICAL CENTER | Age: 50
DRG: 621 | End: 2020-09-02
Payer: COMMERCIAL

## 2020-09-02 ENCOUNTER — HOSPITAL ENCOUNTER (INPATIENT)
Facility: MEDICAL CENTER | Age: 50
LOS: 1 days | DRG: 621 | End: 2020-09-03
Attending: COLON & RECTAL SURGERY | Admitting: COLON & RECTAL SURGERY
Payer: COMMERCIAL

## 2020-09-02 ENCOUNTER — ANESTHESIA EVENT (OUTPATIENT)
Dept: SURGERY | Facility: MEDICAL CENTER | Age: 50
DRG: 621 | End: 2020-09-02
Payer: COMMERCIAL

## 2020-09-02 LAB — PATHOLOGY CONSULT NOTE: NORMAL

## 2020-09-02 PROCEDURE — 501497 HCHG SURGICLIP: Performed by: COLON & RECTAL SURGERY

## 2020-09-02 PROCEDURE — 700101 HCHG RX REV CODE 250: Performed by: COLON & RECTAL SURGERY

## 2020-09-02 PROCEDURE — A9270 NON-COVERED ITEM OR SERVICE: HCPCS | Performed by: COLON & RECTAL SURGERY

## 2020-09-02 PROCEDURE — 160036 HCHG PACU - EA ADDL 30 MINS PHASE I: Performed by: COLON & RECTAL SURGERY

## 2020-09-02 PROCEDURE — 700111 HCHG RX REV CODE 636 W/ 250 OVERRIDE (IP): Performed by: PHYSICIAN ASSISTANT

## 2020-09-02 PROCEDURE — 700102 HCHG RX REV CODE 250 W/ 637 OVERRIDE(OP): Performed by: COLON & RECTAL SURGERY

## 2020-09-02 PROCEDURE — 160029 HCHG SURGERY MINUTES - 1ST 30 MINS LEVEL 4: Performed by: COLON & RECTAL SURGERY

## 2020-09-02 PROCEDURE — 160009 HCHG ANES TIME/MIN: Performed by: COLON & RECTAL SURGERY

## 2020-09-02 PROCEDURE — 501338 HCHG SHEARS, ENDO: Performed by: COLON & RECTAL SURGERY

## 2020-09-02 PROCEDURE — 501570 HCHG TROCAR, SEPARATOR: Performed by: COLON & RECTAL SURGERY

## 2020-09-02 PROCEDURE — A9270 NON-COVERED ITEM OR SERVICE: HCPCS | Performed by: PHYSICIAN ASSISTANT

## 2020-09-02 PROCEDURE — 160041 HCHG SURGERY MINUTES - EA ADDL 1 MIN LEVEL 4: Performed by: COLON & RECTAL SURGERY

## 2020-09-02 PROCEDURE — 700102 HCHG RX REV CODE 250 W/ 637 OVERRIDE(OP): Performed by: PHYSICIAN ASSISTANT

## 2020-09-02 PROCEDURE — 700111 HCHG RX REV CODE 636 W/ 250 OVERRIDE (IP): Performed by: ANESTHESIOLOGY

## 2020-09-02 PROCEDURE — 501399 HCHG SPECIMAN BAG, ENDO CATC: Performed by: COLON & RECTAL SURGERY

## 2020-09-02 PROCEDURE — 700111 HCHG RX REV CODE 636 W/ 250 OVERRIDE (IP)

## 2020-09-02 PROCEDURE — 700111 HCHG RX REV CODE 636 W/ 250 OVERRIDE (IP): Performed by: COLON & RECTAL SURGERY

## 2020-09-02 PROCEDURE — 0FB24ZX EXCISION OF LEFT LOBE LIVER, PERCUTANEOUS ENDOSCOPIC APPROACH, DIAGNOSTIC: ICD-10-PCS | Performed by: COLON & RECTAL SURGERY

## 2020-09-02 PROCEDURE — 88305 TISSUE EXAM BY PATHOLOGIST: CPT

## 2020-09-02 PROCEDURE — 502570 HCHG PACK, GASTRIC BANDING: Performed by: COLON & RECTAL SURGERY

## 2020-09-02 PROCEDURE — 0DB64Z3 EXCISION OF STOMACH, PERCUTANEOUS ENDOSCOPIC APPROACH, VERTICAL: ICD-10-PCS | Performed by: COLON & RECTAL SURGERY

## 2020-09-02 PROCEDURE — 700102 HCHG RX REV CODE 250 W/ 637 OVERRIDE(OP): Performed by: ANESTHESIOLOGY

## 2020-09-02 PROCEDURE — 501838 HCHG SUTURE GENERAL: Performed by: COLON & RECTAL SURGERY

## 2020-09-02 PROCEDURE — 700105 HCHG RX REV CODE 258: Performed by: PHYSICIAN ASSISTANT

## 2020-09-02 PROCEDURE — 700105 HCHG RX REV CODE 258: Performed by: COLON & RECTAL SURGERY

## 2020-09-02 PROCEDURE — 88307 TISSUE EXAM BY PATHOLOGIST: CPT

## 2020-09-02 PROCEDURE — 770006 HCHG ROOM/CARE - MED/SURG/GYN SEMI*

## 2020-09-02 PROCEDURE — 500521 HCHG ENDOSTITCH LOAD UNIT: Performed by: COLON & RECTAL SURGERY

## 2020-09-02 PROCEDURE — 160048 HCHG OR STATISTICAL LEVEL 1-5: Performed by: COLON & RECTAL SURGERY

## 2020-09-02 PROCEDURE — 160002 HCHG RECOVERY MINUTES (STAT): Performed by: COLON & RECTAL SURGERY

## 2020-09-02 PROCEDURE — 88313 SPECIAL STAINS GROUP 2: CPT | Mod: 91

## 2020-09-02 PROCEDURE — 500522 HCHG ENDOSTITCH SUTURING DEVICE: Performed by: COLON & RECTAL SURGERY

## 2020-09-02 PROCEDURE — 700101 HCHG RX REV CODE 250: Performed by: ANESTHESIOLOGY

## 2020-09-02 PROCEDURE — 160035 HCHG PACU - 1ST 60 MINS PHASE I: Performed by: COLON & RECTAL SURGERY

## 2020-09-02 PROCEDURE — A9270 NON-COVERED ITEM OR SERVICE: HCPCS | Performed by: ANESTHESIOLOGY

## 2020-09-02 PROCEDURE — 501583 HCHG TROCAR, THRD CAN&SEAL 5X100: Performed by: COLON & RECTAL SURGERY

## 2020-09-02 PROCEDURE — 0BQT4ZZ REPAIR DIAPHRAGM, PERCUTANEOUS ENDOSCOPIC APPROACH: ICD-10-PCS | Performed by: COLON & RECTAL SURGERY

## 2020-09-02 RX ORDER — ONDANSETRON 2 MG/ML
4 INJECTION INTRAMUSCULAR; INTRAVENOUS
Status: DISCONTINUED | OUTPATIENT
Start: 2020-09-02 | End: 2020-09-02 | Stop reason: HOSPADM

## 2020-09-02 RX ORDER — HYDROMORPHONE HYDROCHLORIDE 2 MG/ML
1 INJECTION, SOLUTION INTRAMUSCULAR; INTRAVENOUS; SUBCUTANEOUS ONCE
Status: DISCONTINUED | OUTPATIENT
Start: 2020-09-02 | End: 2020-09-02 | Stop reason: HOSPADM

## 2020-09-02 RX ORDER — GABAPENTIN 300 MG/1
300 CAPSULE ORAL 3 TIMES DAILY
Status: DISCONTINUED | OUTPATIENT
Start: 2020-09-02 | End: 2020-09-03 | Stop reason: HOSPADM

## 2020-09-02 RX ORDER — GABAPENTIN 300 MG/1
300 CAPSULE ORAL ONCE
Status: COMPLETED | OUTPATIENT
Start: 2020-09-02 | End: 2020-09-02

## 2020-09-02 RX ORDER — DIPHENHYDRAMINE HCL 25 MG
25 TABLET ORAL EVERY 6 HOURS PRN
Status: DISCONTINUED | OUTPATIENT
Start: 2020-09-02 | End: 2020-09-03 | Stop reason: HOSPADM

## 2020-09-02 RX ORDER — PHENYLEPHRINE HCL IN 0.9% NACL 0.5 MG/5ML
SYRINGE (ML) INTRAVENOUS PRN
Status: DISCONTINUED | OUTPATIENT
Start: 2020-09-02 | End: 2020-09-02 | Stop reason: SURG

## 2020-09-02 RX ORDER — LIDOCAINE HYDROCHLORIDE 10 MG/ML
INJECTION, SOLUTION EPIDURAL; INFILTRATION; INTRACAUDAL; PERINEURAL
Status: COMPLETED
Start: 2020-09-02 | End: 2020-09-02

## 2020-09-02 RX ORDER — DIPHENHYDRAMINE HYDROCHLORIDE 50 MG/ML
12.5 INJECTION INTRAMUSCULAR; INTRAVENOUS EVERY 6 HOURS PRN
Status: DISCONTINUED | OUTPATIENT
Start: 2020-09-02 | End: 2020-09-03 | Stop reason: HOSPADM

## 2020-09-02 RX ORDER — LABETALOL HYDROCHLORIDE 5 MG/ML
5 INJECTION, SOLUTION INTRAVENOUS
Status: DISCONTINUED | OUTPATIENT
Start: 2020-09-02 | End: 2020-09-02 | Stop reason: HOSPADM

## 2020-09-02 RX ORDER — SODIUM CHLORIDE, SODIUM LACTATE, POTASSIUM CHLORIDE, CALCIUM CHLORIDE 600; 310; 30; 20 MG/100ML; MG/100ML; MG/100ML; MG/100ML
INJECTION, SOLUTION INTRAVENOUS CONTINUOUS
Status: DISCONTINUED | OUTPATIENT
Start: 2020-09-02 | End: 2020-09-02 | Stop reason: HOSPADM

## 2020-09-02 RX ORDER — SIMETHICONE 80 MG
80 TABLET,CHEWABLE ORAL 3 TIMES DAILY PRN
Status: DISCONTINUED | OUTPATIENT
Start: 2020-09-02 | End: 2020-09-03 | Stop reason: HOSPADM

## 2020-09-02 RX ORDER — MEPERIDINE HYDROCHLORIDE 25 MG/ML
INJECTION INTRAMUSCULAR; INTRAVENOUS; SUBCUTANEOUS PRN
Status: DISCONTINUED | OUTPATIENT
Start: 2020-09-02 | End: 2020-09-02 | Stop reason: SURG

## 2020-09-02 RX ORDER — IBUPROFEN 200 MG
200 TABLET ORAL EVERY 6 HOURS PRN
Status: ON HOLD | COMMUNITY
End: 2020-09-03

## 2020-09-02 RX ORDER — ENALAPRILAT 1.25 MG/ML
2.5 INJECTION INTRAVENOUS EVERY 6 HOURS PRN
Status: DISCONTINUED | OUTPATIENT
Start: 2020-09-02 | End: 2020-09-03 | Stop reason: HOSPADM

## 2020-09-02 RX ORDER — MIDAZOLAM HYDROCHLORIDE 1 MG/ML
1 INJECTION INTRAMUSCULAR; INTRAVENOUS
Status: DISCONTINUED | OUTPATIENT
Start: 2020-09-02 | End: 2020-09-02 | Stop reason: HOSPADM

## 2020-09-02 RX ORDER — HALOPERIDOL 5 MG/ML
1 INJECTION INTRAMUSCULAR
Status: DISCONTINUED | OUTPATIENT
Start: 2020-09-02 | End: 2020-09-02 | Stop reason: HOSPADM

## 2020-09-02 RX ORDER — ACETAMINOPHEN 10 MG/ML
1000 INJECTION, SOLUTION INTRAVENOUS EVERY 6 HOURS
Status: COMPLETED | OUTPATIENT
Start: 2020-09-02 | End: 2020-09-03

## 2020-09-02 RX ORDER — HYDROMORPHONE HYDROCHLORIDE 1 MG/ML
1 INJECTION, SOLUTION INTRAMUSCULAR; INTRAVENOUS; SUBCUTANEOUS ONCE
Status: COMPLETED | OUTPATIENT
Start: 2020-09-02 | End: 2020-09-02

## 2020-09-02 RX ORDER — ACETAMINOPHEN 10 MG/ML
1 INJECTION, SOLUTION INTRAVENOUS ONCE
Status: COMPLETED | OUTPATIENT
Start: 2020-09-02 | End: 2020-09-02

## 2020-09-02 RX ORDER — ONDANSETRON 2 MG/ML
4 INJECTION INTRAMUSCULAR; INTRAVENOUS
Status: COMPLETED | OUTPATIENT
Start: 2020-09-02 | End: 2020-09-02

## 2020-09-02 RX ORDER — SCOLOPAMINE TRANSDERMAL SYSTEM 1 MG/1
1 PATCH, EXTENDED RELEASE TRANSDERMAL
Status: DISCONTINUED | OUTPATIENT
Start: 2020-09-02 | End: 2020-09-03 | Stop reason: HOSPADM

## 2020-09-02 RX ORDER — OXYCODONE HCL 5 MG/5 ML
10 SOLUTION, ORAL ORAL
Status: DISCONTINUED | OUTPATIENT
Start: 2020-09-02 | End: 2020-09-03 | Stop reason: HOSPADM

## 2020-09-02 RX ORDER — SUCCINYLCHOLINE/SOD CL,ISO/PF 200MG/10ML
SYRINGE (ML) INTRAVENOUS PRN
Status: DISCONTINUED | OUTPATIENT
Start: 2020-09-02 | End: 2020-09-02 | Stop reason: SURG

## 2020-09-02 RX ORDER — HYDROMORPHONE HYDROCHLORIDE 1 MG/ML
0.5 INJECTION, SOLUTION INTRAMUSCULAR; INTRAVENOUS; SUBCUTANEOUS
Status: DISCONTINUED | OUTPATIENT
Start: 2020-09-02 | End: 2020-09-03 | Stop reason: HOSPADM

## 2020-09-02 RX ORDER — CEFAZOLIN SODIUM 1 G/3ML
INJECTION, POWDER, FOR SOLUTION INTRAMUSCULAR; INTRAVENOUS PRN
Status: DISCONTINUED | OUTPATIENT
Start: 2020-09-02 | End: 2020-09-02 | Stop reason: SURG

## 2020-09-02 RX ORDER — DIPHENHYDRAMINE HYDROCHLORIDE 50 MG/ML
12.5 INJECTION INTRAMUSCULAR; INTRAVENOUS
Status: DISCONTINUED | OUTPATIENT
Start: 2020-09-02 | End: 2020-09-02 | Stop reason: HOSPADM

## 2020-09-02 RX ORDER — BUPIVACAINE HYDROCHLORIDE AND EPINEPHRINE 5; 5 MG/ML; UG/ML
INJECTION, SOLUTION PERINEURAL
Status: DISCONTINUED | OUTPATIENT
Start: 2020-09-02 | End: 2020-09-02 | Stop reason: HOSPADM

## 2020-09-02 RX ORDER — OXYCODONE HCL 5 MG/5 ML
5 SOLUTION, ORAL ORAL
Status: COMPLETED | OUTPATIENT
Start: 2020-09-02 | End: 2020-09-02

## 2020-09-02 RX ORDER — SODIUM CHLORIDE, SODIUM LACTATE, POTASSIUM CHLORIDE, CALCIUM CHLORIDE 600; 310; 30; 20 MG/100ML; MG/100ML; MG/100ML; MG/100ML
INJECTION, SOLUTION INTRAVENOUS CONTINUOUS
Status: DISCONTINUED | OUTPATIENT
Start: 2020-09-02 | End: 2020-09-02

## 2020-09-02 RX ORDER — PROMETHAZINE HYDROCHLORIDE 25 MG/1
25 SUPPOSITORY RECTAL EVERY 4 HOURS PRN
Status: DISCONTINUED | OUTPATIENT
Start: 2020-09-02 | End: 2020-09-03 | Stop reason: HOSPADM

## 2020-09-02 RX ORDER — GLYCOPYRROLATE 0.2 MG/ML
INJECTION INTRAMUSCULAR; INTRAVENOUS PRN
Status: DISCONTINUED | OUTPATIENT
Start: 2020-09-02 | End: 2020-09-02 | Stop reason: SURG

## 2020-09-02 RX ORDER — MEPERIDINE HYDROCHLORIDE 25 MG/ML
25 INJECTION INTRAMUSCULAR; INTRAVENOUS; SUBCUTANEOUS
Status: DISCONTINUED | OUTPATIENT
Start: 2020-09-02 | End: 2020-09-02 | Stop reason: HOSPADM

## 2020-09-02 RX ORDER — DIPHENHYDRAMINE HYDROCHLORIDE 50 MG/ML
25 INJECTION INTRAMUSCULAR; INTRAVENOUS EVERY 6 HOURS PRN
Status: DISCONTINUED | OUTPATIENT
Start: 2020-09-02 | End: 2020-09-03 | Stop reason: HOSPADM

## 2020-09-02 RX ORDER — HALOPERIDOL 5 MG/ML
1 INJECTION INTRAMUSCULAR EVERY 6 HOURS PRN
Status: DISCONTINUED | OUTPATIENT
Start: 2020-09-02 | End: 2020-09-03 | Stop reason: HOSPADM

## 2020-09-02 RX ORDER — OXYCODONE HCL 5 MG/5 ML
10 SOLUTION, ORAL ORAL
Status: COMPLETED | OUTPATIENT
Start: 2020-09-02 | End: 2020-09-02

## 2020-09-02 RX ORDER — ONDANSETRON 2 MG/ML
INJECTION INTRAMUSCULAR; INTRAVENOUS PRN
Status: DISCONTINUED | OUTPATIENT
Start: 2020-09-02 | End: 2020-09-02 | Stop reason: SURG

## 2020-09-02 RX ORDER — SODIUM CHLORIDE, SODIUM LACTATE, POTASSIUM CHLORIDE, AND CALCIUM CHLORIDE .6; .31; .03; .02 G/100ML; G/100ML; G/100ML; G/100ML
500 INJECTION, SOLUTION INTRAVENOUS
Status: DISCONTINUED | OUTPATIENT
Start: 2020-09-02 | End: 2020-09-03 | Stop reason: HOSPADM

## 2020-09-02 RX ORDER — OXYCODONE HCL 5 MG/5 ML
10 SOLUTION, ORAL ORAL
Status: DISCONTINUED | OUTPATIENT
Start: 2020-09-02 | End: 2020-09-02 | Stop reason: HOSPADM

## 2020-09-02 RX ORDER — ONDANSETRON 2 MG/ML
4 INJECTION INTRAMUSCULAR; INTRAVENOUS EVERY 6 HOURS PRN
Status: DISCONTINUED | OUTPATIENT
Start: 2020-09-02 | End: 2020-09-03 | Stop reason: HOSPADM

## 2020-09-02 RX ORDER — ROCURONIUM BROMIDE 10 MG/ML
INJECTION, SOLUTION INTRAVENOUS PRN
Status: DISCONTINUED | OUTPATIENT
Start: 2020-09-02 | End: 2020-09-02 | Stop reason: SURG

## 2020-09-02 RX ORDER — ACETAMINOPHEN 500 MG
1000 TABLET ORAL EVERY 6 HOURS PRN
Status: DISCONTINUED | OUTPATIENT
Start: 2020-09-03 | End: 2020-09-03 | Stop reason: HOSPADM

## 2020-09-02 RX ORDER — CALCIUM CARBONATE 500 MG/1
500 TABLET, CHEWABLE ORAL
Status: DISCONTINUED | OUTPATIENT
Start: 2020-09-02 | End: 2020-09-03 | Stop reason: HOSPADM

## 2020-09-02 RX ORDER — OXYCODONE HCL 10 MG/1
10 TABLET, FILM COATED, EXTENDED RELEASE ORAL ONCE
Status: COMPLETED | OUTPATIENT
Start: 2020-09-02 | End: 2020-09-02

## 2020-09-02 RX ORDER — OXYCODONE HCL 5 MG/5 ML
5 SOLUTION, ORAL ORAL
Status: DISCONTINUED | OUTPATIENT
Start: 2020-09-02 | End: 2020-09-02 | Stop reason: HOSPADM

## 2020-09-02 RX ORDER — NEOSTIGMINE METHYLSULFATE 1 MG/ML
INJECTION, SOLUTION INTRAVENOUS PRN
Status: DISCONTINUED | OUTPATIENT
Start: 2020-09-02 | End: 2020-09-02 | Stop reason: SURG

## 2020-09-02 RX ORDER — OXYCODONE HCL 5 MG/5 ML
5 SOLUTION, ORAL ORAL
Status: DISCONTINUED | OUTPATIENT
Start: 2020-09-02 | End: 2020-09-03 | Stop reason: HOSPADM

## 2020-09-02 RX ADMIN — PROPOFOL 100 MG: 10 INJECTION, EMULSION INTRAVENOUS at 13:40

## 2020-09-02 RX ADMIN — ALFENTANIL HYDROCHLORIDE 1000 MCG: 500 INJECTION INTRAVENOUS at 13:38

## 2020-09-02 RX ADMIN — MEPERIDINE HYDROCHLORIDE 25 MG: 25 INJECTION INTRAMUSCULAR; INTRAVENOUS; SUBCUTANEOUS at 14:44

## 2020-09-02 RX ADMIN — ROCURONIUM BROMIDE 5 MG: 10 INJECTION, SOLUTION INTRAVENOUS at 13:38

## 2020-09-02 RX ADMIN — MIDAZOLAM HYDROCHLORIDE 1 MG: 1 INJECTION, SOLUTION INTRAMUSCULAR; INTRAVENOUS at 15:02

## 2020-09-02 RX ADMIN — GABAPENTIN 300 MG: 300 CAPSULE ORAL at 12:17

## 2020-09-02 RX ADMIN — Medication 50 MCG: at 13:55

## 2020-09-02 RX ADMIN — POTASSIUM CHLORIDE: 149 INJECTION, SOLUTION, CONCENTRATE INTRAVENOUS at 18:43

## 2020-09-02 RX ADMIN — MEPERIDINE HYDROCHLORIDE 25 MG: 25 INJECTION INTRAMUSCULAR; INTRAVENOUS; SUBCUTANEOUS at 16:17

## 2020-09-02 RX ADMIN — Medication 0.5 ML: at 12:35

## 2020-09-02 RX ADMIN — ACETAMINOPHEN 1000 MG: 10 INJECTION, SOLUTION INTRAVENOUS at 23:46

## 2020-09-02 RX ADMIN — DIPHENHYDRAMINE HYDROCHLORIDE 12.5 MG: 50 INJECTION INTRAMUSCULAR; INTRAVENOUS at 15:52

## 2020-09-02 RX ADMIN — HYDROMORPHONE HYDROCHLORIDE 1 MG: 1 INJECTION, SOLUTION INTRAMUSCULAR; INTRAVENOUS; SUBCUTANEOUS at 15:33

## 2020-09-02 RX ADMIN — MEPERIDINE HYDROCHLORIDE 25 MG: 25 INJECTION INTRAMUSCULAR; INTRAVENOUS; SUBCUTANEOUS at 13:49

## 2020-09-02 RX ADMIN — MEPERIDINE HYDROCHLORIDE 25 MG: 25 INJECTION INTRAMUSCULAR; INTRAVENOUS; SUBCUTANEOUS at 15:05

## 2020-09-02 RX ADMIN — ACETAMINOPHEN 1 G: 10 INJECTION, SOLUTION INTRAVENOUS at 12:35

## 2020-09-02 RX ADMIN — OXYCODONE HYDROCHLORIDE 5 MG: 5 SOLUTION ORAL at 20:58

## 2020-09-02 RX ADMIN — SODIUM CHLORIDE, POTASSIUM CHLORIDE, SODIUM LACTATE AND CALCIUM CHLORIDE: 600; 310; 30; 20 INJECTION, SOLUTION INTRAVENOUS at 12:34

## 2020-09-02 RX ADMIN — MEPERIDINE HYDROCHLORIDE 25 MG: 25 INJECTION INTRAMUSCULAR; INTRAVENOUS; SUBCUTANEOUS at 14:19

## 2020-09-02 RX ADMIN — CEFAZOLIN 3 G: 330 INJECTION, POWDER, FOR SOLUTION INTRAMUSCULAR; INTRAVENOUS at 13:37

## 2020-09-02 RX ADMIN — POVIDONE-IODINE 15 ML: 10 SOLUTION TOPICAL at 12:18

## 2020-09-02 RX ADMIN — GLYCOPYRROLATE 0.5 MG: 0.2 INJECTION INTRAMUSCULAR; INTRAVENOUS at 14:21

## 2020-09-02 RX ADMIN — ONDANSETRON 4 MG: 2 INJECTION INTRAMUSCULAR; INTRAVENOUS at 14:21

## 2020-09-02 RX ADMIN — NEOSTIGMINE METHYLSULFATE 3 MG: 1 INJECTION INTRAVENOUS at 14:21

## 2020-09-02 RX ADMIN — FAMOTIDINE 20 MG: 10 INJECTION INTRAVENOUS at 18:43

## 2020-09-02 RX ADMIN — OXYCODONE HYDROCHLORIDE 10 MG: 10 TABLET, FILM COATED, EXTENDED RELEASE ORAL at 12:17

## 2020-09-02 RX ADMIN — SCOLOPAMINE TRANSDERMAL SYSTEM 1 PATCH: 1 PATCH, EXTENDED RELEASE TRANSDERMAL at 12:16

## 2020-09-02 RX ADMIN — ROCURONIUM BROMIDE 30 MG: 10 INJECTION, SOLUTION INTRAVENOUS at 13:50

## 2020-09-02 RX ADMIN — PROPOFOL 20 MG: 10 INJECTION, EMULSION INTRAVENOUS at 13:38

## 2020-09-02 RX ADMIN — Medication 150 MG: at 13:40

## 2020-09-02 RX ADMIN — ONDANSETRON 4 MG: 2 INJECTION INTRAMUSCULAR; INTRAVENOUS at 14:47

## 2020-09-02 RX ADMIN — ACETAMINOPHEN 1000 MG: 10 INJECTION, SOLUTION INTRAVENOUS at 18:43

## 2020-09-02 RX ADMIN — SODIUM CHLORIDE, POTASSIUM CHLORIDE, SODIUM LACTATE AND CALCIUM CHLORIDE: 600; 310; 30; 20 INJECTION, SOLUTION INTRAVENOUS at 12:33

## 2020-09-02 RX ADMIN — MIDAZOLAM HYDROCHLORIDE 1 MG: 1 INJECTION, SOLUTION INTRAMUSCULAR; INTRAVENOUS at 14:51

## 2020-09-02 RX ADMIN — OXYCODONE HYDROCHLORIDE 10 MG: 5 SOLUTION ORAL at 14:36

## 2020-09-02 RX ADMIN — LIDOCAINE HYDROCHLORIDE 0.5 ML: 10 INJECTION, SOLUTION EPIDURAL; INFILTRATION; INTRACAUDAL at 12:35

## 2020-09-02 SDOH — HEALTH STABILITY: MENTAL HEALTH: HOW OFTEN DO YOU HAVE 6 OR MORE DRINKS ON ONE OCCASION?: NEVER

## 2020-09-02 SDOH — HEALTH STABILITY: MENTAL HEALTH: HOW OFTEN DO YOU HAVE A DRINK CONTAINING ALCOHOL?: NEVER

## 2020-09-02 ASSESSMENT — PAIN DESCRIPTION - PAIN TYPE
TYPE: SURGICAL PAIN

## 2020-09-02 ASSESSMENT — COGNITIVE AND FUNCTIONAL STATUS - GENERAL
STANDING UP FROM CHAIR USING ARMS: A LITTLE
DRESSING REGULAR UPPER BODY CLOTHING: A LITTLE
DAILY ACTIVITIY SCORE: 19
MOBILITY SCORE: 18
SUGGESTED CMS G CODE MODIFIER MOBILITY: CK
TOILETING: A LITTLE
PERSONAL GROOMING: A LITTLE
WALKING IN HOSPITAL ROOM: A LITTLE
CLIMB 3 TO 5 STEPS WITH RAILING: A LITTLE
DRESSING REGULAR LOWER BODY CLOTHING: A LITTLE
MOVING TO AND FROM BED TO CHAIR: A LITTLE
TURNING FROM BACK TO SIDE WHILE IN FLAT BAD: A LITTLE
SUGGESTED CMS G CODE MODIFIER DAILY ACTIVITY: CK
HELP NEEDED FOR BATHING: A LITTLE
MOVING FROM LYING ON BACK TO SITTING ON SIDE OF FLAT BED: A LITTLE

## 2020-09-02 ASSESSMENT — LIFESTYLE VARIABLES
EVER FELT BAD OR GUILTY ABOUT YOUR DRINKING: NO
CONSUMPTION TOTAL: NEGATIVE
TOTAL SCORE: 0
EVER HAD A DRINK FIRST THING IN THE MORNING TO STEADY YOUR NERVES TO GET RID OF A HANGOVER: NO
HOW MANY TIMES IN THE PAST YEAR HAVE YOU HAD 5 OR MORE DRINKS IN A DAY: 0
ALCOHOL_USE: NO
ON A TYPICAL DAY WHEN YOU DRINK ALCOHOL HOW MANY DRINKS DO YOU HAVE: 0
DOES PATIENT WANT TO STOP DRINKING: NO
TOTAL SCORE: 0
AVERAGE NUMBER OF DAYS PER WEEK YOU HAVE A DRINK CONTAINING ALCOHOL: 0
TOTAL SCORE: 0
HAVE YOU EVER FELT YOU SHOULD CUT DOWN ON YOUR DRINKING: NO
HAVE PEOPLE ANNOYED YOU BY CRITICIZING YOUR DRINKING: NO

## 2020-09-02 ASSESSMENT — FIBROSIS 4 INDEX: FIB4 SCORE: 0.47

## 2020-09-02 ASSESSMENT — PAIN SCALES - GENERAL: PAIN_LEVEL: 0

## 2020-09-02 ASSESSMENT — PATIENT HEALTH QUESTIONNAIRE - PHQ9
SUM OF ALL RESPONSES TO PHQ9 QUESTIONS 1 AND 2: 0
2. FEELING DOWN, DEPRESSED, IRRITABLE, OR HOPELESS: NOT AT ALL
1. LITTLE INTEREST OR PLEASURE IN DOING THINGS: NOT AT ALL

## 2020-09-02 NOTE — ANESTHESIA QCDR
2019 [facility] Clinical Data Registry (for Quality Improvement)     Postoperative nausea/vomiting risk protocol (Adult = 18 yrs and Pediatric 3-17 yrs)- (430 and 463)  General inhalation anesthetic (NOT TIVA) with PONV risk factors: No  Provision of anti-emetic therapy with at least 2 different classes of agents: N/A  Patient DID NOT receive anti-emetic therapy and reason is documented in Medical Record: N/A    Multimodal Pain Management- (477)  Non-emergent surgery AND patient age >= 18: Yes  Use of Multimodal Pain Management, two or more drugs and/or interventions, NOT including systemic opioids: Yes  Exception: Documented allergy to multiple classes of analgesics: N/A    Smoking Abstinence (404)  Patient is current smoker (cigarette, pipe, e-cig, marijuanna): No  Elective Surgery:   Abstinence instructions provided prior to day of surgery:   Patient abstained from smoking on day of surgery:     Pre-Op Beta-Blocker in Isolated CABG (44)  Isolated CABG AND patient age >= 18: No  Beta-blocker admin within 24 hours of surgical incision:   Exception:of medical reason(s) for not administering beta blocker within 24 hours prior to surgical incision (e.g., not  indicated,other medical reason):     PACU assessment of acute postoperative pain prior to Anesthesia Care End- Applies to Patients Age = 18- (ABG7)  Initial PACU pain score is which of the following: < 7/10  Patient unable to report pain score: N/A    Post-anesthetic transfer of care checklist/protocol to PACU/ICU- (426 and 427)  Upon conclusion of case, patient transferred to which of the following locations: PACU/Non-ICU  Use of transfer checklist/protocol: Yes  Exclusion: Service Performed in Patient Hospital Room (and thus did not require transfer): N/A  Unplanned admission to ICU related to anesthesia service up through end of PACU care- (MD51)  Unplanned admission to ICU (not initially anticipated at anesthesia start time): No

## 2020-09-02 NOTE — OR NURSING
Pt's wife Shahla phoned and updated on pt status in Recovery and transfer to [facility]-.

## 2020-09-02 NOTE — PROGRESS NOTES
Patient in pre-op, assessment completed, patient and wife michael updated on plan of care, all questions answered, no further needs at this time, call light within reach.

## 2020-09-02 NOTE — ANESTHESIA TIME REPORT
Anesthesia Start and Stop Event Times     Date Time Event    9/2/2020 1044 Ready for Procedure     1337 Anesthesia Start     1429 Anesthesia Stop        Responsible Staff  09/02/20    Name Role Begin End    Robert Valencia M.D. Anesth 1337 142        Preop Diagnosis (Free Text):  Pre-op Diagnosis     MORBID OBESITY        Preop Diagnosis (Codes):    Post op Diagnosis  Morbid (severe) obesity due to excess calories      Premium Reason  Non-Premium    Comments:

## 2020-09-02 NOTE — ANESTHESIA PROCEDURE NOTES
Airway    Date/Time: 9/2/2020 1:41 PM  Performed by: Robert Valencia M.D.  Authorized by: Robert Valencia M.D.     Location:  OR  Urgency:  Elective  Indications for Airway Management:  Anesthesia      Spontaneous Ventilation: absent    Sedation Level:  Deep  Preoxygenated: Yes    Patient Position:  Sniffing  Final Airway Type:  Endotracheal airway  Final Endotracheal Airway:  ETT  Cuffed: Yes    Technique Used for Successful ETT Placement:  Direct laryngoscopy    Insertion Site:  Oral  Blade Type:  Cruz  Laryngoscope Blade/Videolaryngoscope Blade Size:  2  ETT Size (mm):  8.0  Measured from:  Lips  ETT to Lips (cm):  24  Placement Verified by: auscultation and capnometry    Cormack-Lehane Classification:  Grade I - full view of glottis  Number of Attempts at Approach:  1

## 2020-09-02 NOTE — OR NURSING
Difficult time bringing pt's surgical pain down to a tolerable level.    Pt on 2 L NC.  Nausea treated with Zofran, Benadryl and QueaseEase.  Pt tolerated sip of water with PO liquid pain medication.  Oral swabs provided.   X 4 laparoscopic abdominal incisions, scant serosanguineous drainage, ice pack in place.  Pain now down from 9/10 to 5/10, tolerable per pt.  VSS, afebrile, DE GUZMAN, A/O x4.    One clear pt belonging bag on [facility].  Surgical mask in place.   Oxygen tank greater than 75% full.

## 2020-09-02 NOTE — ANESTHESIA PREPROCEDURE EVALUATION
Relevant Problems   No relevant active problems       Physical Exam    Airway   Mallampati: II  TM distance: >3 FB  Neck ROM: full       Cardiovascular - normal exam  Rhythm: regular  Rate: normal     Dental - normal exam           Pulmonary - normal exam  Breath sounds clear to auscultation  (+) decreased breath sounds     Abdominal   (+) obese     Neurological - normal exam                 Anesthesia Plan    ASA 3   ASA physical status 3 criteria: morbid obesity - BMI greater than or equal to 40    Plan - general       Airway plan will be ETT        Induction: intravenous and rapid sequence    Postoperative Plan: Postoperative administration of opioids is intended.    Pertinent diagnostic labs and testing reviewed    Informed Consent:    Anesthetic plan and risks discussed with patient.    Use of blood products discussed with: patient whom consented to blood products.

## 2020-09-02 NOTE — ANESTHESIA POSTPROCEDURE EVALUATION
Patient: Devin Montgomery    Procedure Summary     Date: 09/02/20 Room / Location: [place] [address] / SURGERY [facility]    Anesthesia Start: 1337 Anesthesia Stop: 1429    Procedures:       GASTRECTOMY, SLEEVE, LAPAROSCOPIC (Abdomen)      REPAIR, HERNIA, HIATAL, LAPAROSCOPIC (Abdomen)      BIOPSY, LIVER, LAPAROSCOPIC (Abdomen) Diagnosis: (MORBID OBESITY, HIATAL HERNIA)    Surgeon: Bud Hayward M.D. Responsible Provider: Robert Valencia M.D.    Anesthesia Type: general ASA Status: 3          Final Anesthesia Type: general  Last vitals  BP   Blood Pressure: 138/81    Temp   36.1 °C (96.9 °F)    Pulse   Pulse: 87   Resp   18    SpO2   96 %      Anesthesia Post Evaluation    Patient location during evaluation: PACU  Patient participation: complete - patient participated  Level of consciousness: awake and alert  Pain score: 0    Airway patency: patent  Anesthetic complications: no  Cardiovascular status: hemodynamically stable  Respiratory status: acceptable  Hydration status: euvolemic    PONV: none           Nurse Pain Score: 0 (NPRS)

## 2020-09-02 NOTE — OP REPORT
NAME:  Devin Montgomery  MRN:  [id number]  :  1970      DATE OF OPERATION: 2020    PREOPERATIVE DIAGNOSIS: Morbid Obesity with medical sequelae; Congested Fatty Liver Disease; Hiatal hernia    POSTOPERATIVE DIAGNOSIS: Morbid Obesity with medical sequelae; Congested Fatty Liver Disease; Hiatal hernia    OPERATION PERFORMED: 1.  Laparoscopic Sleeve Gastrectomy  2.  Left Lobe Liver Biopsy   3. Cruroplasty hiatal hernia repair    SURGEON: Bud Hayward MD    ASSISTANT:  Baylee Rosario PA-C    ANESTHESIOLOGIST:  Anesthesiologist: Robert Valencia M.D.    ANESTHESIA: General endotracheal anesthesia.     SPECIMEN: Stomach; Liver Biopsy    ESTIMATED BLOOD LOSS: <10cc.     INDICATIONS: The patient is a 50 y.o. male with a diagnosis of morbid obesity with medical sequelae. He is taken to the operating room today for Laparoscopic Sleeve Gastrectomy, hiatal hernia repair and liver biopsy.     PROCEDURE: Following informed consent, the patient was properly identified, taken to the operating room, and placed in the supine position where general endotracheal anesthesia was administered. Intravenous antibiotics were administered by the anesthesiologist in the correct time interval. Sequential compression devices were employed. The abdomen was prepped and draped into a sterile field.     An optical entry bladeless  trocar was utilized and pneumoperitoneum carefully established in the usual fashion.  The bladeless 5 mm separator trocar was introduced and the 5 mm lens/camera was passed into the peritoneal cavity.  Three additional separator trocars were placed under direct vision.  A 5 mm Robi-type liver retractor was placed into position.  This was used to elevate the left sided segment of the liver.  It was secured to the patients right side with a robot arm.  Careful inspection revealed no untoward events with placement of the trocars.    The gastrocolic omentum was examined and dissected with the ligasure  device and a point on the distal antrum was selected to begin the sleeve gastrectomy.  A 40 Luxembourger bougie was then passed down into the antrum.  A careful inspection at the hiatus demonstrated a <3 cm hiatal hernia which would require repair or risk significant reflux. An [employer] linear stapler with a thick-tissue cartridges, was employed to divide partway across the stomach.  With the bougie in position, the stapler was then used to march proximally along the stomach and transsection of the stomach was performed, beginning 5 cm proximal to the pylorus in the method of the sleeve gastrectomy.  The greater curvature aspect of the stomach was then dissected and the greater curvature vessels and short gastric vessels were divided with the ligasure, and use of some hemoclips.  The hiataus and crura were exposed, The herniated fat and retroesophageal lipoma were reduced. 2cm or more of intraabdominal esophagus was freed.    The last endomechanical stapler firings were used to complete the transection of the stomach.  Hemoclips were used if any oozing along staple line locations. Careful inspection of the staple line demonstrated excellent, meticulous hemostasis and a completely intact staple line with seemless tissue approximation.  Seromuscular sutures were placed using polysorb suture to further secure the staple line.  The liver exhibited significant hepatic steatosis.  A [place] liver biopsy was obtained from the left lobe of the liver and sent for pathology to evaluate for cirrhosis, steatohepatitis or hemochromatosis.  Hemostasis on the liver was achieved with cautery.     The hiatus was examined and dissected to free the hernia sac and mobilize the esophagus circumferentially, into the mediastinum and freeing 3cm of intra-abdominal esophagus. The hiatal hernia was then repaired using 0 Ethibond endostitches as anterior cruroplasty. The bougie was then removed.     The large endocatch bag was then used to retrieve  the stomach specimen.  Tisseal fibrin glue sealant was sprayed along the entire staple line. The ports were removed under direct vision and the pneumoperitoneum was allowed to escape. The fascia of this port was closed with 0-Vicryl suture.  The port sites were then irrigated well.  The port site skin incisions were closed with interrupted 4-0 Vicryl subcuticular sutures.  Steri-Strips and Benzoin were applied beneath sterile Band-Aids.     The patient tolerated the procedure well and there were no apparent complications. All sponge, needle, and instrument counts were correct on 2 separate occasions. He was awakened, extubated, and transferred to the recovery room in satisfactory condition.       ____________________________________   Bud Hayward MD  DD: 9/2/2020  2:25 PM    CC:  [facility];

## 2020-09-03 VITALS
BODY MASS INDEX: 44.04 KG/M2 | WEIGHT: 274.03 LBS | RESPIRATION RATE: 19 BRPM | HEART RATE: 73 BPM | HEIGHT: 66 IN | DIASTOLIC BLOOD PRESSURE: 84 MMHG | SYSTOLIC BLOOD PRESSURE: 139 MMHG | TEMPERATURE: 97.2 F | OXYGEN SATURATION: 92 %

## 2020-09-03 LAB
ALBUMIN SERPL BCP-MCNC: 3.9 G/DL (ref 3.2–4.9)
ALBUMIN/GLOB SERPL: 1.6 G/DL
ALP SERPL-CCNC: 73 U/L (ref 30–99)
ALT SERPL-CCNC: 39 U/L (ref 2–50)
ANION GAP SERPL CALC-SCNC: 10 MMOL/L (ref 7–16)
AST SERPL-CCNC: 25 U/L (ref 12–45)
BILIRUB SERPL-MCNC: 0.8 MG/DL (ref 0.1–1.5)
BUN SERPL-MCNC: 11 MG/DL (ref 8–22)
CALCIUM SERPL-MCNC: 9.1 MG/DL (ref 8.5–10.5)
CHLORIDE SERPL-SCNC: 103 MMOL/L (ref 96–112)
CO2 SERPL-SCNC: 25 MMOL/L (ref 20–33)
CREAT SERPL-MCNC: 0.85 MG/DL (ref 0.5–1.4)
ERYTHROCYTE [DISTWIDTH] IN BLOOD BY AUTOMATED COUNT: 45.9 FL (ref 35.9–50)
GLOBULIN SER CALC-MCNC: 2.5 G/DL (ref 1.9–3.5)
GLUCOSE SERPL-MCNC: 124 MG/DL (ref 65–99)
HCT VFR BLD AUTO: 42.7 % (ref 42–52)
HGB BLD-MCNC: 14.2 G/DL (ref 14–18)
MCH RBC QN AUTO: 31.7 PG (ref 27–33)
MCHC RBC AUTO-ENTMCNC: 33.3 G/DL (ref 33.7–35.3)
MCV RBC AUTO: 95.3 FL (ref 81.4–97.8)
PLATELET # BLD AUTO: 314 K/UL (ref 164–446)
PMV BLD AUTO: 9.4 FL (ref 9–12.9)
POTASSIUM SERPL-SCNC: 4.6 MMOL/L (ref 3.6–5.5)
PROT SERPL-MCNC: 6.4 G/DL (ref 6–8.2)
RBC # BLD AUTO: 4.48 M/UL (ref 4.7–6.1)
SODIUM SERPL-SCNC: 138 MMOL/L (ref 135–145)
WBC # BLD AUTO: 13.4 K/UL (ref 4.8–10.8)

## 2020-09-03 PROCEDURE — A9270 NON-COVERED ITEM OR SERVICE: HCPCS | Performed by: PHYSICIAN ASSISTANT

## 2020-09-03 PROCEDURE — 700105 HCHG RX REV CODE 258: Performed by: PHYSICIAN ASSISTANT

## 2020-09-03 PROCEDURE — 700111 HCHG RX REV CODE 636 W/ 250 OVERRIDE (IP): Performed by: PHYSICIAN ASSISTANT

## 2020-09-03 PROCEDURE — 700102 HCHG RX REV CODE 250 W/ 637 OVERRIDE(OP): Performed by: PHYSICIAN ASSISTANT

## 2020-09-03 PROCEDURE — 85027 COMPLETE CBC AUTOMATED: CPT

## 2020-09-03 PROCEDURE — 80053 COMPREHEN METABOLIC PANEL: CPT

## 2020-09-03 PROCEDURE — 36415 COLL VENOUS BLD VENIPUNCTURE: CPT

## 2020-09-03 RX ADMIN — POTASSIUM CHLORIDE: 149 INJECTION, SOLUTION, CONCENTRATE INTRAVENOUS at 02:24

## 2020-09-03 RX ADMIN — GABAPENTIN 300 MG: 300 CAPSULE ORAL at 11:46

## 2020-09-03 RX ADMIN — OXYCODONE HYDROCHLORIDE 5 MG: 5 SOLUTION ORAL at 10:03

## 2020-09-03 RX ADMIN — OXYCODONE HYDROCHLORIDE 5 MG: 5 SOLUTION ORAL at 14:46

## 2020-09-03 RX ADMIN — GABAPENTIN 300 MG: 300 CAPSULE ORAL at 04:43

## 2020-09-03 RX ADMIN — OXYCODONE HYDROCHLORIDE 5 MG: 5 SOLUTION ORAL at 04:22

## 2020-09-03 RX ADMIN — FAMOTIDINE 20 MG: 10 INJECTION INTRAVENOUS at 04:43

## 2020-09-03 RX ADMIN — ENOXAPARIN SODIUM 40 MG: 40 INJECTION SUBCUTANEOUS at 04:43

## 2020-09-03 ASSESSMENT — PAIN DESCRIPTION - PAIN TYPE
TYPE: ACUTE PAIN
TYPE: SURGICAL PAIN
TYPE: ACUTE PAIN

## 2020-09-03 ASSESSMENT — ENCOUNTER SYMPTOMS
ABDOMINAL PAIN: 1
SHORTNESS OF BREATH: 0
NAUSEA: 0
DIARRHEA: 0
CHILLS: 0
HEARTBURN: 0
FEVER: 0
PALPITATIONS: 0
COUGH: 0
VOMITING: 0

## 2020-09-03 NOTE — PROGRESS NOTES
Surgical Progress Note    Author: Baylee Rosario P.A.-C. Date & Time created: 9/3/2020   8:36 AM     Interval Events:  POD#1 Lap Sleeve Gastrectomy and hiatal hernia repair. PT is doing well, sitting on the edge of the bed. Tolerating clears without nausea/vomiting. Admits to typical incisional abdominal pain, controlled with medication. Pt is ambulating and voiding.     Review of Systems   Constitutional: Negative for chills and fever.   Respiratory: Negative for cough and shortness of breath.    Cardiovascular: Negative for chest pain and palpitations.   Gastrointestinal: Positive for abdominal pain (incisional). Negative for diarrhea, heartburn, nausea and vomiting.   Genitourinary: Negative for dysuria.     Hemodynamics:  Temp (24hrs), Av.2 °C (97.2 °F), Min:36.1 °C (96.9 °F), Max:36.4 °C (97.6 °F)  Temperature: 36.2 °C (97.2 °F)  Pulse  Av.6  Min: 61  Max: 96   Blood Pressure: 108/70     Respiratory:    Respiration: 18, Pulse Oximetry: 93 %           Neuro:  GCS       Fluids:    Intake/Output Summary (Last 24 hours) at 9/3/2020 0836  Last data filed at 9/3/2020 0400  Gross per 24 hour   Intake 2220 ml   Output --   Net 2220 ml     Weight: 124.3 kg (274 lb 0.5 oz)  Current Diet Order   Procedures   • Diet Order     Physical Exam  Constitutional:       Appearance: He is obese.   HENT:      Head: Normocephalic and atraumatic.      Mouth/Throat:      Pharynx: Oropharynx is clear.   Eyes:      Conjunctiva/sclera: Conjunctivae normal.   Neck:      Musculoskeletal: Normal range of motion.   Cardiovascular:      Rate and Rhythm: Normal rate and regular rhythm.   Pulmonary:      Effort: Pulmonary effort is normal.   Abdominal:      General: There is distension (mild).      Palpations: Abdomen is soft. There is no mass.      Tenderness: There is abdominal tenderness (incisional). There is no guarding or rebound.   Musculoskeletal: Normal range of motion.   Skin:     General: Skin is warm and dry.      Findings: No  erythema or rash.      Comments: Incisions with minimal serosanguinous ooze   Neurological:      Mental Status: He is alert and oriented to person, place, and time.   Psychiatric:         Mood and Affect: Mood normal.       Labs:  Recent Results (from the past 24 hour(s))   Histology Request    Collection Time: 09/02/20  3:24 PM   Result Value Ref Range    Pathology Request Sent to Histo    CBC without Differential (blood)    Collection Time: 09/03/20  5:44 AM   Result Value Ref Range    WBC 13.4 (H) 4.8 - 10.8 K/uL    RBC 4.48 (L) 4.70 - 6.10 M/uL    Hemoglobin 14.2 14.0 - 18.0 g/dL    Hematocrit 42.7 42.0 - 52.0 %    MCV 95.3 81.4 - 97.8 fL    MCH 31.7 27.0 - 33.0 pg    MCHC 33.3 (L) 33.7 - 35.3 g/dL    RDW 45.9 35.9 - 50.0 fL    Platelet Count 314 164 - 446 K/uL    MPV 9.4 9.0 - 12.9 fL   Comp Metabolic Panel (CMP)    Collection Time: 09/03/20  5:44 AM   Result Value Ref Range    Sodium 138 135 - 145 mmol/L    Potassium 4.6 3.6 - 5.5 mmol/L    Chloride 103 96 - 112 mmol/L    Co2 25 20 - 33 mmol/L    Anion Gap 10.0 7.0 - 16.0    Glucose 124 (H) 65 - 99 mg/dL    Bun 11 8 - 22 mg/dL    Creatinine 0.85 0.50 - 1.40 mg/dL    Calcium 9.1 8.5 - 10.5 mg/dL    AST(SGOT) 25 12 - 45 U/L    ALT(SGPT) 39 2 - 50 U/L    Alkaline Phosphatase 73 30 - 99 U/L    Total Bilirubin 0.8 0.1 - 1.5 mg/dL    Albumin 3.9 3.2 - 4.9 g/dL    Total Protein 6.4 6.0 - 8.2 g/dL    Globulin 2.5 1.9 - 3.5 g/dL    A-G Ratio 1.6 g/dL   ESTIMATED GFR    Collection Time: 09/03/20  5:44 AM   Result Value Ref Range    GFR If African American >60 >60 mL/min/1.73 m 2    GFR If Non African American >60 >60 mL/min/1.73 m 2     Medical Decision Making, by Problem:  There are no active hospital problems to display for this patient.    Plan:  Pt is alert and oriented, NAD. Breathing unlabored. Tolerating PO.  Incisions ok. VS stable. Labs reviewed.  Leukocytosis, likely reactive. Encouraged ambulation and incentive spirometry.  Wean O2. Pt may need to stay  another night for nausea, pain control, hypoxia, will see how the day progresses. Otherwise okay for discharge later this afternoon. Pt also seen and examined by Dr. Hayward.      Quality Measures:  Quality-Core Measures   Reviewed items::  Labs reviewed  Martel catheter::  No Martel  DVT prophylaxis pharmacological::  Enoxaparin (Lovenox)  DVT prophylaxis - mechanical:  SCDs      Discussed patient condition with RN, Patient and Dr. Hayward

## 2020-09-03 NOTE — PROGRESS NOTES
Received report from Mayda in PACU.  Pt arrived to unit. Ambulated to bed. Pt resting comfortably. States pain is 5/10, denies nausea. Pt on 2L of  O2.  Oriented to unit policies and call light.

## 2020-09-03 NOTE — PROGRESS NOTES
Bedside report received.  Assessment complete.  A&O x 4. Patient calls appropriately.  Patient up with SBA assist. Bed alarm n/a.   Patient has 5/10 pain. Medication provided per MAR  Denies N&V. Tolerating NPO diet until breakfast tray then clear liquid diet as tolerated.  Surgical 9/2 gastric sleeve, left lobe liver biopsy, hiatal hernia repair.  + void, + flatus, PTA 9/1 BM.  Patient denies SOB.  SCD's yes, off.  Review plan with of care with patient. Call light and personal belongings with in reach. Hourly rounding in place. All needs met at this time.

## 2020-09-03 NOTE — PROGRESS NOTES
Discharging patient home per physician order. Demonstrated understanding of discharge instructions, follow up appointments, home medications, post-op diet, home care for surgical wound. Verbalized understanding of discharge instructions and educational handouts.  Stated several reasons why to return to ED or seek medical attention. All questions answered.  All belongings with patient at time of discharge.

## 2020-09-03 NOTE — PROGRESS NOTES
AA&Ox4.   RA. Denies SOB.  Reporting 5/10 pain. Tolerable per pt.  Lap sites x 4 with steristrips and bandaids. Dressings dry and intact. Small SS drainage noted.  Clear liquids started this AM. Denies N/V.  + void.   LBM PTA.   Pt up and ambulating hallways independently.  All needs met at this time. Call light within reach. Pt calls appropriately.

## 2020-09-03 NOTE — CARE PLAN
Problem: Communication  Goal: The ability to communicate needs accurately and effectively will improve  Outcome: PROGRESSING AS EXPECTED  Education provided on importance of using call light to alert staff of patient needs. Pt demonstrates understanding by using call light appropriately.      Problem: Knowledge Deficit  Goal: Knowledge of disease process/condition, treatment plan, diagnostic tests, and medications will improve  Outcome: PROGRESSING AS EXPECTED   POC discussed. Questions and concerns addressed.

## 2020-09-03 NOTE — DISCHARGE INSTRUCTIONS
Discharge Instructions    Discharged to home by car with relative. Discharged via wheelchair, hospital escort: Yes.  Special equipment needed: Not Applicable    Be sure to schedule a follow-up appointment with your primary care doctor or any specialists as instructed.     Discharge Plan:        I understand that a diet low in cholesterol, fat, and sodium is recommended for good health. Unless I have been given specific instructions below for another diet, I accept this instruction as my diet prescription.   Other diet: gastric clears, refer to post-op booklet for diet progression    Special Instructions: None    · Is patient discharged on Warfarin / Coumadin?   No     Depression / Suicide Risk    As you are discharged from this [facility] facility, it is important to learn how to keep safe from harming yourself.    Recognize the warning signs:  · Abrupt changes in personality, positive or negative- including increase in energy   · Giving away possessions  · Change in eating patterns- significant weight changes-  positive or negative  · Change in sleeping patterns- unable to sleep or sleeping all the time   · Unwillingness or inability to communicate  · Depression  · Unusual sadness, discouragement and loneliness  · Talk of wanting to die  · Neglect of personal appearance   · Rebelliousness- reckless behavior  · Withdrawal from people/activities they love  · Confusion- inability to concentrate     If you or a loved one observes any of these behaviors or has concerns about self-harm, here's what you can do:  · Talk about it- your feelings and reasons for harming yourself  · Remove any means that you might use to hurt yourself (examples: pills, rope, extension cords, firearm)  · Get professional help from the community (Mental Health, Substance Abuse, psychological counseling)  · Do not be alone:Call your Safe Contact- someone whom you trust who will be there for you.  · Call your local CRISIS HOTLINE 518-9315 or  519.997.7975  · Call your local Children's Mobile Crisis Response Team [place] (552) 355-5306 or www.[place]  · Call the toll free National Suicide Prevention Hotlines   · National Suicide Prevention Lifeline 052-835-CLWJ (8945)  · National Hope Line Network 800-SUICIDE (276-9384)    Bariatric D/C instructions:     1. DIET: Follow the diet progression detailed in your post-op booklet.  Progressing as instructed will make for a smooth transition after surgery and prevent any pain associated with eating foods before your stomach is ready or eating too much.  Water and hydration is much more important than food intake the first week or two after surgery.  Drink enough water to keep your urine pale yellow.  Increase water intake if your urine is a darker yellow or if have burning with urination.  Nausea and vomiting once or twice after you leave the hospital is normal.  Sip fluids continually and stay hydrated.     2.  SUPPLEMENTS: Start your supplements 1 week after surgery.  You may need to cut some supplements in half initially.  Follow the guidelines from your supplement handout from your pre-op class.     3. ACTIVITIES: After discharge from the hospital, you may resume full routine activities. However, there should be no heavy lifting (greater than 15 pounds) and no strenuous activities until after your follow-up visit. Otherwise, routine activities of daily living are acceptable.  More movement and walking after surgery the better.     4. DRIVING: You may drive whenever you are off pain medications and are able to perform the activities needed to drive, i.e. turning, bending, twisting, etc.     5. BATHING AND WOUND CARE: You may get the wound wet 2 days after surgery. You may shower, but do not submerge in a bath for at least a week. Dressings may come off after 48 hours. Please leave the steri-strips in place, they will fall off over 5-7 days. You may notice some clear or slightly bloody drainage  from your wounds and there may be some mild redness or bruising around your incision sites.  This is normal.     6. BOWEL FUNCTION: Constipation is common after an operation, especially with pain medications. The combination of pain medication and decreased activity level can cause constipation in otherwise normal patients. If you feel this is occurring, take a laxative (Milk of Magnesia, Miralax, etc.) until the problem has resolved.  Diarrhea the first few days after surgery can also be normal.  You may notice that it is dark in color or see blood, which is also normal and should subside in the first week post-op.     7. PAIN MEDICATION: You have been given a prescription for pain medication preoperatively.  Please take these as directed. It is important to remember not to take medications on an empty stomach as this may cause nausea.  For minimal discomfort you may use liquid Tylenol 650 mg every 4 hours.  DO NOT exceed 4,000 mg of Tylenol in 24 hours.  DO NOT use non steroidal anti-inflamatory medication such as: Asprin, Ibuprofen, Advil, Motrin, Aleve, or steroids.  These medication can cause ulcers after weight loss surgery.     8.CALL IF YOU HAVE: (1) Fevers to more than 101.5 F, (2) leg pain or swelling (3) Drainage or fluid from incision that may be foul smelling, increased tenderness or soreness at the wound or the wound edges are no longer together, redness or swelling at the incision site. (4) Night Sweats (5) Shaking, Chills (6) Persistent Nausea or Vomiting for over 24 hours.  Use your anti nausea medication as prescribed. (7) Call 911 if sudden onset of chest pain or shortness of breath that does not improve with 5-10 min of rest.     9. APPOINTMENT: Contact our office at [phone] for a follow-up appointment in 1-2 weeks following your procedure.  Our office hours are Monday-Friday, 8am-5pm.  Please try to call during these hours when we are better able to assist you.     If you have any  additional questions, please do not hesitate to call the office and speak to either myself or the physician on call.     Office address:   [facility].   [address] [place] [address]   [address]   [place] [id number]

## 2020-09-03 NOTE — CARE PLAN
Problem: Communication  Goal: The ability to communicate needs accurately and effectively will improve  Outcome: PROGRESSING AS EXPECTED  Encouraged to ask questions and express concerns     Problem: Safety  Goal: Will remain free from injury  Outcome: PROGRESSING AS EXPECTED  Bed in low position, bed controls on   Goal: Will remain free from falls  Outcome: PROGRESSING AS EXPECTED  Pt will call when OOB for SBA

## 2020-09-03 NOTE — DIETARY
NUTRITION SERVICES: BMI - Pt with BMI >40 (=Body mass index is 44.23 kg/m².), morbid obesity. Weight loss counseling not appropriate in acute care setting. Pt s/p lap sleeve gastrectomy and hiatal hernia repair.  Anticipate f/u planned with MD/RD post d/c.     RECOMMEND - Referral to outpatient nutrition services for weight management, or F/u with MD/RD after D/C.

## 2020-10-20 ENCOUNTER — HOSPITAL ENCOUNTER (OUTPATIENT)
Dept: LAB | Facility: MEDICAL CENTER | Age: 50
End: 2020-10-20
Attending: PHYSICIAN ASSISTANT
Payer: COMMERCIAL

## 2020-10-20 LAB
25(OH)D3 SERPL-MCNC: 34 NG/ML (ref 30–100)
ALBUMIN SERPL BCP-MCNC: 4.2 G/DL (ref 3.2–4.9)
ALBUMIN/GLOB SERPL: 1.6 G/DL
ALP SERPL-CCNC: 82 U/L (ref 30–99)
ALT SERPL-CCNC: 32 U/L (ref 2–50)
ANION GAP SERPL CALC-SCNC: 8 MMOL/L (ref 7–16)
AST SERPL-CCNC: 22 U/L (ref 12–45)
BASOPHILS # BLD AUTO: 0.8 % (ref 0–1.8)
BASOPHILS # BLD: 0.05 K/UL (ref 0–0.12)
BILIRUB SERPL-MCNC: 0.7 MG/DL (ref 0.1–1.5)
BUN SERPL-MCNC: 18 MG/DL (ref 8–22)
CALCIUM SERPL-MCNC: 9.4 MG/DL (ref 8.5–10.5)
CHLORIDE SERPL-SCNC: 107 MMOL/L (ref 96–112)
CHOLEST SERPL-MCNC: 156 MG/DL (ref 100–199)
CO2 SERPL-SCNC: 26 MMOL/L (ref 20–33)
CREAT SERPL-MCNC: 0.72 MG/DL (ref 0.5–1.4)
EOSINOPHIL # BLD AUTO: 0.14 K/UL (ref 0–0.51)
EOSINOPHIL NFR BLD: 2.2 % (ref 0–6.9)
ERYTHROCYTE [DISTWIDTH] IN BLOOD BY AUTOMATED COUNT: 45.6 FL (ref 35.9–50)
EST. AVERAGE GLUCOSE BLD GHB EST-MCNC: 114 MG/DL
FERRITIN SERPL-MCNC: 218 NG/ML (ref 22–322)
FOLATE SERPL-MCNC: 19 NG/ML
GLOBULIN SER CALC-MCNC: 2.7 G/DL (ref 1.9–3.5)
GLUCOSE SERPL-MCNC: 98 MG/DL (ref 65–99)
HBA1C MFR BLD: 5.6 % (ref 0–5.6)
HCT VFR BLD AUTO: 45.2 % (ref 42–52)
HDLC SERPL-MCNC: 43 MG/DL
HGB BLD-MCNC: 14.8 G/DL (ref 14–18)
IMM GRANULOCYTES # BLD AUTO: 0.02 K/UL (ref 0–0.11)
IMM GRANULOCYTES NFR BLD AUTO: 0.3 % (ref 0–0.9)
IRON SATN MFR SERPL: 34 % (ref 15–55)
IRON SERPL-MCNC: 96 UG/DL (ref 50–180)
LDLC SERPL CALC-MCNC: 90 MG/DL
LYMPHOCYTES # BLD AUTO: 2.72 K/UL (ref 1–4.8)
LYMPHOCYTES NFR BLD: 42.7 % (ref 22–41)
MCH RBC QN AUTO: 31.1 PG (ref 27–33)
MCHC RBC AUTO-ENTMCNC: 32.7 G/DL (ref 33.7–35.3)
MCV RBC AUTO: 95 FL (ref 81.4–97.8)
MONOCYTES # BLD AUTO: 0.54 K/UL (ref 0–0.85)
MONOCYTES NFR BLD AUTO: 8.5 % (ref 0–13.4)
NEUTROPHILS # BLD AUTO: 2.9 K/UL (ref 1.82–7.42)
NEUTROPHILS NFR BLD: 45.5 % (ref 44–72)
NRBC # BLD AUTO: 0 K/UL
NRBC BLD-RTO: 0 /100 WBC
PLATELET # BLD AUTO: 342 K/UL (ref 164–446)
PMV BLD AUTO: 10.7 FL (ref 9–12.9)
POTASSIUM SERPL-SCNC: 4.3 MMOL/L (ref 3.6–5.5)
PREALB SERPL-MCNC: 21.5 MG/DL (ref 18–38)
PROT SERPL-MCNC: 6.9 G/DL (ref 6–8.2)
RBC # BLD AUTO: 4.76 M/UL (ref 4.7–6.1)
SODIUM SERPL-SCNC: 141 MMOL/L (ref 135–145)
TIBC SERPL-MCNC: 283 UG/DL (ref 250–450)
TRANSFERRIN SERPL-MCNC: 230 MG/DL (ref 200–370)
TRIGL SERPL-MCNC: 117 MG/DL (ref 0–149)
UIBC SERPL-MCNC: 187 UG/DL (ref 110–370)
VIT B12 SERPL-MCNC: 1041 PG/ML (ref 211–911)
WBC # BLD AUTO: 6.4 K/UL (ref 4.8–10.8)

## 2020-10-20 PROCEDURE — 83036 HEMOGLOBIN GLYCOSYLATED A1C: CPT

## 2020-10-20 PROCEDURE — 84252 ASSAY OF VITAMIN B-2: CPT

## 2020-10-20 PROCEDURE — 84134 ASSAY OF PREALBUMIN: CPT

## 2020-10-20 PROCEDURE — 80061 LIPID PANEL: CPT

## 2020-10-20 PROCEDURE — 82746 ASSAY OF FOLIC ACID SERUM: CPT

## 2020-10-20 PROCEDURE — 85025 COMPLETE CBC W/AUTO DIFF WBC: CPT

## 2020-10-20 PROCEDURE — 82728 ASSAY OF FERRITIN: CPT

## 2020-10-20 PROCEDURE — 82306 VITAMIN D 25 HYDROXY: CPT

## 2020-10-20 PROCEDURE — 84466 ASSAY OF TRANSFERRIN: CPT

## 2020-10-20 PROCEDURE — 80053 COMPREHEN METABOLIC PANEL: CPT

## 2020-10-20 PROCEDURE — 36415 COLL VENOUS BLD VENIPUNCTURE: CPT

## 2020-10-20 PROCEDURE — 84207 ASSAY OF VITAMIN B-6: CPT

## 2020-10-20 PROCEDURE — 83550 IRON BINDING TEST: CPT

## 2020-10-20 PROCEDURE — 84630 ASSAY OF ZINC: CPT

## 2020-10-20 PROCEDURE — 83540 ASSAY OF IRON: CPT

## 2020-10-20 PROCEDURE — 84425 ASSAY OF VITAMIN B-1: CPT

## 2020-10-20 PROCEDURE — 82607 VITAMIN B-12: CPT

## 2020-10-23 LAB — VIT B2 SERPL-SCNC: 27 NMOL/L (ref 5–50)

## 2020-10-24 LAB
VIT B6 SERPL-MCNC: 121.1 NMOL/L (ref 20–125)
ZINC BLD-MCNC: 669.8 UG/DL (ref 440–860)

## 2020-10-25 LAB — VIT B1 BLD-MCNC: 140 NMOL/L (ref 70–180)

## 2020-12-18 ENCOUNTER — OFFICE VISIT (OUTPATIENT)
Dept: MEDICAL GROUP | Facility: MEDICAL CENTER | Age: 50
End: 2020-12-18
Payer: COMMERCIAL

## 2020-12-18 VITALS
OXYGEN SATURATION: 96 % | HEART RATE: 104 BPM | SYSTOLIC BLOOD PRESSURE: 114 MMHG | TEMPERATURE: 97 F | HEIGHT: 67 IN | WEIGHT: 243 LBS | BODY MASS INDEX: 38.14 KG/M2 | DIASTOLIC BLOOD PRESSURE: 86 MMHG

## 2020-12-18 DIAGNOSIS — E66.01 MORBID OBESITY WITH BMI OF 40.0-44.9, ADULT (HCC): ICD-10-CM

## 2020-12-18 DIAGNOSIS — M25.561 ACUTE PAIN OF RIGHT KNEE: ICD-10-CM

## 2020-12-18 PROBLEM — R10.11 RUQ PAIN: Status: RESOLVED | Noted: 2018-07-13 | Resolved: 2020-12-18

## 2020-12-18 PROCEDURE — 99214 OFFICE O/P EST MOD 30 MIN: CPT | Performed by: FAMILY MEDICINE

## 2020-12-18 ASSESSMENT — FIBROSIS 4 INDEX: FIB4 SCORE: 0.58

## 2020-12-18 NOTE — ASSESSMENT & PLAN NOTE
X 4 months  Started after gastric surgery  Mostly right, a little on the left  All around the anterior knee.   He is requesting a referral to see Dr. Crabtree.   Medial   Feels like a needle

## 2020-12-18 NOTE — PROGRESS NOTES
Subjective:     CC: Diagnoses of Acute pain of right knee and Morbid obesity with BMI of 40.0-44.9, adult (HCC) were pertinent to this visit.    HPI: Patient is a 50 y.o. male established patient who presents today with concern regarding R knee pain.       Acute pain of right knee  New problem.  Has been going on for the past 4 months.  That this right knee pain started after his gastric bypass surgery as soon as he recovered.  He reports the pain is mostly anterior/medial.  He states it feels like a needle is being pushed into his knee.  He is reporting some left-sided knee pain although nowhere near as significant.  The patient works as a , has been painful at work.  He has a friend who saw Dr. Crabtree, sports medicine, he is requesting a referral specifically to see Dr. Crabtree.     Morbid obesity with BMI of 40.0-44.9, adult (HCC)  Chronic problem.  The patient had gastric bypass surgery in September.  Down about 45lbs since Sept. doing quite well.  Very pleased with his results.  Denies any abdominal pain or side effects.        History reviewed. No pertinent past medical history.    Social History     Tobacco Use   • Smoking status: Former Smoker     Packs/day: 0.50     Years: 19.00     Pack years: 9.50     Types: Cigarettes     Quit date:      Years since quittin.9   • Smokeless tobacco: Never Used   Substance Use Topics   • Alcohol use: Not Currently     Frequency: Never     Binge frequency: Never   • Drug use: No       Current Outpatient Medications Ordered in Epic   Medication Sig Dispense Refill   • Multiple Vitamins-Minerals (MENS MULTIVITAMIN) Tab Take 1 Tab by mouth every day.     • Ascorbic Acid (VITAMIN C) 1000 MG Tab Take 1 Tab by mouth every day.       No current [facility]-ordered facility-administered medications on file.        Allergies:  Patient has no known allergies.    Health Maintenance: Completed    ROS:  Pulm: no sob, no cough  CV: no chest pain, no  "palpitations      Objective:       Exam:  /86 (BP Location: Left arm, Patient Position: Sitting, BP Cuff Size: Adult long)   Pulse (!) 104   Temp 36.1 °C (97 °F) (Temporal)   Ht 1.702 m (5' 7\")   Wt 110.2 kg (243 lb)   SpO2 96%   BMI 38.06 kg/m²  Body mass index is 38.06 kg/m².    General: Normal appearing. No distress.  HEAD: NCAT  EYES: conjunctiva clear, lids without ptosis, pupils equal and reactive to light  EARS: ears normal shape and contour.  MOUTH: normal dentition   Neck:  Normal ROM  Pulmonary: Normal effort. Normal respiratory rate.  Cardiovascular: Well perfused. No LE edema  Neurologic: Grossly normal, no focal deficits  Skin: Warm and dry.  No obvious lesions.  Musculoskeletal: Normal gait and station. Knee: Full ROM, full strength, no TTP , no edema , varus/valgus stress negative, Lachman's negative  .Psych: Normal mood and affect. Alert and oriented x3. Judgment and insight is normal.    Labs: 10/20/2020 results reviewed and discussed with the patient, questions answered.    Assessment & Plan:     50 y.o. male with the following -     1. Acute pain of right knee  New problem.  The patient is reporting anterior/medial right knee pain for the past 4 months.  He is requesting referral to Dr. Crabtree, sports medicine which was placed today.  Also started physical therapy.  - REFERRAL TO SPORTS MEDICINE  - REFERRAL TO PHYSICAL THERAPY    2. Morbid obesity with BMI of 40.0-44.9, adult (HCC)  Chronic problem, improving since gastric bypass surgery, down 45 pounds since September.  Doing quite well, very pleased with his results.      Return if symptoms worsen or fail to improve.    Please note that this dictation was created using voice recognition software. I have made every reasonable attempt to correct obvious errors, but I expect that there are errors of grammar and possibly content that I did not discover before finalizing the note.      "

## 2021-01-26 ENCOUNTER — HOSPITAL ENCOUNTER (OUTPATIENT)
Dept: LAB | Facility: MEDICAL CENTER | Age: 51
End: 2021-01-26
Attending: NURSE PRACTITIONER
Payer: COMMERCIAL

## 2021-01-26 LAB
25(OH)D3 SERPL-MCNC: 55 NG/ML (ref 30–100)
ALBUMIN SERPL BCP-MCNC: 4.2 G/DL (ref 3.2–4.9)
ALBUMIN/GLOB SERPL: 1.6 G/DL
ALP SERPL-CCNC: 86 U/L (ref 30–99)
ALT SERPL-CCNC: 18 U/L (ref 2–50)
ANION GAP SERPL CALC-SCNC: 9 MMOL/L (ref 7–16)
AST SERPL-CCNC: 22 U/L (ref 12–45)
BASOPHILS # BLD AUTO: 0.7 % (ref 0–1.8)
BASOPHILS # BLD: 0.04 K/UL (ref 0–0.12)
BILIRUB SERPL-MCNC: 0.6 MG/DL (ref 0.1–1.5)
BUN SERPL-MCNC: 13 MG/DL (ref 8–22)
CALCIUM SERPL-MCNC: 9.5 MG/DL (ref 8.5–10.5)
CHLORIDE SERPL-SCNC: 104 MMOL/L (ref 96–112)
CHOLEST SERPL-MCNC: 139 MG/DL (ref 100–199)
CO2 SERPL-SCNC: 24 MMOL/L (ref 20–33)
CREAT SERPL-MCNC: 0.68 MG/DL (ref 0.5–1.4)
EOSINOPHIL # BLD AUTO: 0.12 K/UL (ref 0–0.51)
EOSINOPHIL NFR BLD: 2.1 % (ref 0–6.9)
ERYTHROCYTE [DISTWIDTH] IN BLOOD BY AUTOMATED COUNT: 46.7 FL (ref 35.9–50)
EST. AVERAGE GLUCOSE BLD GHB EST-MCNC: 103 MG/DL
FASTING STATUS PATIENT QL REPORTED: NORMAL
FERRITIN SERPL-MCNC: 203 NG/ML (ref 22–322)
FOLATE SERPL-MCNC: 25.4 NG/ML
GLOBULIN SER CALC-MCNC: 2.6 G/DL (ref 1.9–3.5)
GLUCOSE SERPL-MCNC: 86 MG/DL (ref 65–99)
HBA1C MFR BLD: 5.2 % (ref 0–5.6)
HCT VFR BLD AUTO: 43.6 % (ref 42–52)
HDLC SERPL-MCNC: 45 MG/DL
HGB BLD-MCNC: 14.6 G/DL (ref 14–18)
IMM GRANULOCYTES # BLD AUTO: 0.02 K/UL (ref 0–0.11)
IMM GRANULOCYTES NFR BLD AUTO: 0.3 % (ref 0–0.9)
IRON SATN MFR SERPL: 39 % (ref 15–55)
IRON SERPL-MCNC: 115 UG/DL (ref 50–180)
LDLC SERPL CALC-MCNC: 76 MG/DL
LYMPHOCYTES # BLD AUTO: 2.52 K/UL (ref 1–4.8)
LYMPHOCYTES NFR BLD: 43.6 % (ref 22–41)
MCH RBC QN AUTO: 32.1 PG (ref 27–33)
MCHC RBC AUTO-ENTMCNC: 33.5 G/DL (ref 33.7–35.3)
MCV RBC AUTO: 95.8 FL (ref 81.4–97.8)
MONOCYTES # BLD AUTO: 0.42 K/UL (ref 0–0.85)
MONOCYTES NFR BLD AUTO: 7.3 % (ref 0–13.4)
NEUTROPHILS # BLD AUTO: 2.66 K/UL (ref 1.82–7.42)
NEUTROPHILS NFR BLD: 46 % (ref 44–72)
NRBC # BLD AUTO: 0 K/UL
NRBC BLD-RTO: 0 /100 WBC
PLATELET # BLD AUTO: 322 K/UL (ref 164–446)
PMV BLD AUTO: 10.6 FL (ref 9–12.9)
POTASSIUM SERPL-SCNC: 3.9 MMOL/L (ref 3.6–5.5)
PREALB SERPL-MCNC: 21.6 MG/DL (ref 18–38)
PROT SERPL-MCNC: 6.8 G/DL (ref 6–8.2)
RBC # BLD AUTO: 4.55 M/UL (ref 4.7–6.1)
SODIUM SERPL-SCNC: 137 MMOL/L (ref 135–145)
TIBC SERPL-MCNC: 292 UG/DL (ref 250–450)
TRANSFERRIN SERPL-MCNC: 233 MG/DL (ref 200–370)
TRIGL SERPL-MCNC: 92 MG/DL (ref 0–149)
UIBC SERPL-MCNC: 177 UG/DL (ref 110–370)
VIT B12 SERPL-MCNC: 1250 PG/ML (ref 211–911)
WBC # BLD AUTO: 5.8 K/UL (ref 4.8–10.8)

## 2021-01-26 PROCEDURE — 82728 ASSAY OF FERRITIN: CPT

## 2021-01-26 PROCEDURE — 84425 ASSAY OF VITAMIN B-1: CPT

## 2021-01-26 PROCEDURE — 84252 ASSAY OF VITAMIN B-2: CPT

## 2021-01-26 PROCEDURE — 82306 VITAMIN D 25 HYDROXY: CPT

## 2021-01-26 PROCEDURE — 83550 IRON BINDING TEST: CPT

## 2021-01-26 PROCEDURE — 83540 ASSAY OF IRON: CPT

## 2021-01-26 PROCEDURE — 36415 COLL VENOUS BLD VENIPUNCTURE: CPT

## 2021-01-26 PROCEDURE — 84630 ASSAY OF ZINC: CPT

## 2021-01-26 PROCEDURE — 84207 ASSAY OF VITAMIN B-6: CPT

## 2021-01-26 PROCEDURE — 82607 VITAMIN B-12: CPT

## 2021-01-26 PROCEDURE — 80053 COMPREHEN METABOLIC PANEL: CPT

## 2021-01-26 PROCEDURE — 84134 ASSAY OF PREALBUMIN: CPT

## 2021-01-26 PROCEDURE — 83036 HEMOGLOBIN GLYCOSYLATED A1C: CPT

## 2021-01-26 PROCEDURE — 80061 LIPID PANEL: CPT

## 2021-01-26 PROCEDURE — 84466 ASSAY OF TRANSFERRIN: CPT

## 2021-01-26 PROCEDURE — 85025 COMPLETE CBC W/AUTO DIFF WBC: CPT

## 2021-01-26 PROCEDURE — 82746 ASSAY OF FOLIC ACID SERUM: CPT

## 2021-01-28 LAB — VIT B6 SERPL-MCNC: 231.3 NMOL/L (ref 20–125)

## 2021-01-29 LAB
VIT B1 BLD-MCNC: 139 NMOL/L (ref 70–180)
ZINC BLD-MCNC: 591.3 UG/DL (ref 440–860)

## 2021-01-30 LAB — VIT B2 SERPL-SCNC: 42 NMOL/L (ref 5–50)

## 2021-05-22 ENCOUNTER — HOSPITAL ENCOUNTER (OUTPATIENT)
Dept: LAB | Facility: MEDICAL CENTER | Age: 51
End: 2021-05-22
Attending: NURSE PRACTITIONER
Payer: COMMERCIAL

## 2021-05-22 LAB
ALBUMIN SERPL BCP-MCNC: 4.1 G/DL (ref 3.2–4.9)
ALBUMIN/GLOB SERPL: 1.5 G/DL
ALP SERPL-CCNC: 76 U/L (ref 30–99)
ALT SERPL-CCNC: 18 U/L (ref 2–50)
ANION GAP SERPL CALC-SCNC: 8 MMOL/L (ref 7–16)
AST SERPL-CCNC: 22 U/L (ref 12–45)
BASOPHILS # BLD AUTO: 0.7 % (ref 0–1.8)
BASOPHILS # BLD: 0.04 K/UL (ref 0–0.12)
BILIRUB SERPL-MCNC: 0.5 MG/DL (ref 0.1–1.5)
BUN SERPL-MCNC: 15 MG/DL (ref 8–22)
CALCIUM SERPL-MCNC: 9.4 MG/DL (ref 8.5–10.5)
CHLORIDE SERPL-SCNC: 107 MMOL/L (ref 96–112)
CHOLEST SERPL-MCNC: 173 MG/DL (ref 100–199)
CO2 SERPL-SCNC: 25 MMOL/L (ref 20–33)
CREAT SERPL-MCNC: 0.69 MG/DL (ref 0.5–1.4)
EOSINOPHIL # BLD AUTO: 0.16 K/UL (ref 0–0.51)
EOSINOPHIL NFR BLD: 2.7 % (ref 0–6.9)
ERYTHROCYTE [DISTWIDTH] IN BLOOD BY AUTOMATED COUNT: 47.2 FL (ref 35.9–50)
EST. AVERAGE GLUCOSE BLD GHB EST-MCNC: 120 MG/DL
FERRITIN SERPL-MCNC: 154 NG/ML (ref 22–322)
FOLATE SERPL-MCNC: 18.2 NG/ML
GLOBULIN SER CALC-MCNC: 2.8 G/DL (ref 1.9–3.5)
GLUCOSE SERPL-MCNC: 80 MG/DL (ref 65–99)
HBA1C MFR BLD: 5.8 % (ref 4–5.6)
HCT VFR BLD AUTO: 45.4 % (ref 42–52)
HDLC SERPL-MCNC: 51 MG/DL
HGB BLD-MCNC: 14.7 G/DL (ref 14–18)
IMM GRANULOCYTES # BLD AUTO: 0.01 K/UL (ref 0–0.11)
IMM GRANULOCYTES NFR BLD AUTO: 0.2 % (ref 0–0.9)
IRON SATN MFR SERPL: 34 % (ref 15–55)
IRON SERPL-MCNC: 108 UG/DL (ref 50–180)
LDLC SERPL CALC-MCNC: 97 MG/DL
LYMPHOCYTES # BLD AUTO: 2.69 K/UL (ref 1–4.8)
LYMPHOCYTES NFR BLD: 44.9 % (ref 22–41)
MCH RBC QN AUTO: 31.4 PG (ref 27–33)
MCHC RBC AUTO-ENTMCNC: 32.4 G/DL (ref 33.7–35.3)
MCV RBC AUTO: 97 FL (ref 81.4–97.8)
MONOCYTES # BLD AUTO: 0.5 K/UL (ref 0–0.85)
MONOCYTES NFR BLD AUTO: 8.3 % (ref 0–13.4)
NEUTROPHILS # BLD AUTO: 2.59 K/UL (ref 1.82–7.42)
NEUTROPHILS NFR BLD: 43.2 % (ref 44–72)
NRBC # BLD AUTO: 0 K/UL
NRBC BLD-RTO: 0 /100 WBC
PLATELET # BLD AUTO: 323 K/UL (ref 164–446)
PMV BLD AUTO: 10.2 FL (ref 9–12.9)
POTASSIUM SERPL-SCNC: 4.6 MMOL/L (ref 3.6–5.5)
PREALB SERPL-MCNC: 25.8 MG/DL (ref 18–38)
PROT SERPL-MCNC: 6.9 G/DL (ref 6–8.2)
RBC # BLD AUTO: 4.68 M/UL (ref 4.7–6.1)
SODIUM SERPL-SCNC: 140 MMOL/L (ref 135–145)
TIBC SERPL-MCNC: 316 UG/DL (ref 250–450)
TRANSFERRIN SERPL-MCNC: 243 MG/DL (ref 200–370)
TRIGL SERPL-MCNC: 124 MG/DL (ref 0–149)
UIBC SERPL-MCNC: 208 UG/DL (ref 110–370)
VIT B12 SERPL-MCNC: 1362 PG/ML (ref 211–911)
WBC # BLD AUTO: 6 K/UL (ref 4.8–10.8)

## 2021-05-22 PROCEDURE — 84630 ASSAY OF ZINC: CPT

## 2021-05-22 PROCEDURE — 80061 LIPID PANEL: CPT

## 2021-05-22 PROCEDURE — 83540 ASSAY OF IRON: CPT

## 2021-05-22 PROCEDURE — 83036 HEMOGLOBIN GLYCOSYLATED A1C: CPT

## 2021-05-22 PROCEDURE — 85025 COMPLETE CBC W/AUTO DIFF WBC: CPT

## 2021-05-22 PROCEDURE — 82746 ASSAY OF FOLIC ACID SERUM: CPT

## 2021-05-22 PROCEDURE — 84466 ASSAY OF TRANSFERRIN: CPT

## 2021-05-22 PROCEDURE — 36415 COLL VENOUS BLD VENIPUNCTURE: CPT

## 2021-05-22 PROCEDURE — 83550 IRON BINDING TEST: CPT

## 2021-05-22 PROCEDURE — 82607 VITAMIN B-12: CPT

## 2021-05-22 PROCEDURE — 82728 ASSAY OF FERRITIN: CPT

## 2021-05-22 PROCEDURE — 84134 ASSAY OF PREALBUMIN: CPT

## 2021-05-22 PROCEDURE — 82306 VITAMIN D 25 HYDROXY: CPT

## 2021-05-22 PROCEDURE — 84425 ASSAY OF VITAMIN B-1: CPT

## 2021-05-22 PROCEDURE — 84207 ASSAY OF VITAMIN B-6: CPT

## 2021-05-22 PROCEDURE — 80053 COMPREHEN METABOLIC PANEL: CPT

## 2021-05-22 PROCEDURE — 84252 ASSAY OF VITAMIN B-2: CPT

## 2021-05-25 LAB — 25(OH)D3 SERPL-MCNC: 49 NG/ML (ref 30–80)

## 2021-05-26 LAB
VIT B6 SERPL-MCNC: 249.2 NMOL/L (ref 20–125)
ZINC BLD-MCNC: 746.6 UG/DL (ref 440–860)

## 2021-05-27 LAB
VIT B1 BLD-MCNC: 135 NMOL/L (ref 70–180)
VIT B2 SERPL-SCNC: 37 NMOL/L (ref 5–50)

## 2021-09-17 ENCOUNTER — HOSPITAL ENCOUNTER (OUTPATIENT)
Dept: LAB | Facility: MEDICAL CENTER | Age: 51
End: 2021-09-17
Attending: NURSE PRACTITIONER
Payer: COMMERCIAL

## 2021-09-17 LAB
25(OH)D3 SERPL-MCNC: 49 NG/ML (ref 30–100)
ALBUMIN SERPL BCP-MCNC: 4.3 G/DL (ref 3.2–4.9)
ALBUMIN/GLOB SERPL: 1.6 G/DL
ALP SERPL-CCNC: 80 U/L (ref 30–99)
ALT SERPL-CCNC: 16 U/L (ref 2–50)
ANION GAP SERPL CALC-SCNC: 12 MMOL/L (ref 7–16)
AST SERPL-CCNC: 14 U/L (ref 12–45)
BASOPHILS # BLD AUTO: 0.5 % (ref 0–1.8)
BASOPHILS # BLD: 0.03 K/UL (ref 0–0.12)
BILIRUB SERPL-MCNC: 0.6 MG/DL (ref 0.1–1.5)
BUN SERPL-MCNC: 14 MG/DL (ref 8–22)
CALCIUM SERPL-MCNC: 9.8 MG/DL (ref 8.5–10.5)
CHLORIDE SERPL-SCNC: 105 MMOL/L (ref 96–112)
CHOLEST SERPL-MCNC: 184 MG/DL (ref 100–199)
CO2 SERPL-SCNC: 25 MMOL/L (ref 20–33)
CREAT SERPL-MCNC: 0.75 MG/DL (ref 0.5–1.4)
EOSINOPHIL # BLD AUTO: 0.13 K/UL (ref 0–0.51)
EOSINOPHIL NFR BLD: 2.2 % (ref 0–6.9)
ERYTHROCYTE [DISTWIDTH] IN BLOOD BY AUTOMATED COUNT: 46.9 FL (ref 35.9–50)
FERRITIN SERPL-MCNC: 195 NG/ML (ref 22–322)
FOLATE SERPL-MCNC: 22.4 NG/ML
GLOBULIN SER CALC-MCNC: 2.7 G/DL (ref 1.9–3.5)
GLUCOSE SERPL-MCNC: 94 MG/DL (ref 65–99)
HCT VFR BLD AUTO: 44.6 % (ref 42–52)
HDLC SERPL-MCNC: 51 MG/DL
HGB BLD-MCNC: 14.8 G/DL (ref 14–18)
IMM GRANULOCYTES # BLD AUTO: 0.02 K/UL (ref 0–0.11)
IMM GRANULOCYTES NFR BLD AUTO: 0.3 % (ref 0–0.9)
IRON SERPL-MCNC: 99 UG/DL (ref 50–180)
LDLC SERPL CALC-MCNC: 104 MG/DL
LYMPHOCYTES # BLD AUTO: 2.55 K/UL (ref 1–4.8)
LYMPHOCYTES NFR BLD: 42.9 % (ref 22–41)
MCH RBC QN AUTO: 32.1 PG (ref 27–33)
MCHC RBC AUTO-ENTMCNC: 33.2 G/DL (ref 33.7–35.3)
MCV RBC AUTO: 96.7 FL (ref 81.4–97.8)
MONOCYTES # BLD AUTO: 0.5 K/UL (ref 0–0.85)
MONOCYTES NFR BLD AUTO: 8.4 % (ref 0–13.4)
NEUTROPHILS # BLD AUTO: 2.72 K/UL (ref 1.82–7.42)
NEUTROPHILS NFR BLD: 45.7 % (ref 44–72)
NRBC # BLD AUTO: 0 K/UL
NRBC BLD-RTO: 0 /100 WBC
PLATELET # BLD AUTO: 321 K/UL (ref 164–446)
PMV BLD AUTO: 10 FL (ref 9–12.9)
POTASSIUM SERPL-SCNC: 4.2 MMOL/L (ref 3.6–5.5)
PREALB SERPL-MCNC: 29.7 MG/DL (ref 18–38)
PROT SERPL-MCNC: 7 G/DL (ref 6–8.2)
RBC # BLD AUTO: 4.61 M/UL (ref 4.7–6.1)
SODIUM SERPL-SCNC: 142 MMOL/L (ref 135–145)
TRANSFERRIN SERPL-MCNC: 236 MG/DL (ref 200–370)
TRIGL SERPL-MCNC: 146 MG/DL (ref 0–149)
VIT B12 SERPL-MCNC: 1284 PG/ML (ref 211–911)
WBC # BLD AUTO: 6 K/UL (ref 4.8–10.8)

## 2021-09-17 PROCEDURE — 82728 ASSAY OF FERRITIN: CPT

## 2021-09-17 PROCEDURE — 84466 ASSAY OF TRANSFERRIN: CPT

## 2021-09-17 PROCEDURE — 84134 ASSAY OF PREALBUMIN: CPT

## 2021-09-17 PROCEDURE — 82306 VITAMIN D 25 HYDROXY: CPT

## 2021-09-17 PROCEDURE — 84207 ASSAY OF VITAMIN B-6: CPT

## 2021-09-17 PROCEDURE — 85025 COMPLETE CBC W/AUTO DIFF WBC: CPT

## 2021-09-17 PROCEDURE — 80053 COMPREHEN METABOLIC PANEL: CPT

## 2021-09-17 PROCEDURE — 84252 ASSAY OF VITAMIN B-2: CPT

## 2021-09-17 PROCEDURE — 83540 ASSAY OF IRON: CPT

## 2021-09-17 PROCEDURE — 36415 COLL VENOUS BLD VENIPUNCTURE: CPT

## 2021-09-17 PROCEDURE — 84630 ASSAY OF ZINC: CPT

## 2021-09-17 PROCEDURE — 82607 VITAMIN B-12: CPT

## 2021-09-17 PROCEDURE — 84425 ASSAY OF VITAMIN B-1: CPT

## 2021-09-17 PROCEDURE — 82746 ASSAY OF FOLIC ACID SERUM: CPT

## 2021-09-17 PROCEDURE — 80061 LIPID PANEL: CPT

## 2021-09-21 LAB — VIT B6 SERPL-MCNC: 246.2 NMOL/L (ref 20–125)

## 2021-09-22 LAB
VIT B1 BLD-MCNC: 109 NMOL/L (ref 70–180)
VIT B2 SERPL-SCNC: 18 NMOL/L (ref 5–50)
ZINC BLD-MCNC: 555.8 UG/DL (ref 440–860)

## 2021-12-06 ENCOUNTER — HOSPITAL ENCOUNTER (OUTPATIENT)
Dept: LAB | Facility: MEDICAL CENTER | Age: 51
End: 2021-12-06
Attending: PHYSICIAN ASSISTANT
Payer: COMMERCIAL

## 2021-12-06 LAB
ALBUMIN SERPL BCP-MCNC: 4.3 G/DL (ref 3.2–4.9)
ALBUMIN/GLOB SERPL: 1.7 G/DL
ALP SERPL-CCNC: 72 U/L (ref 30–99)
ALT SERPL-CCNC: 21 U/L (ref 2–50)
ANION GAP SERPL CALC-SCNC: 11 MMOL/L (ref 7–16)
AST SERPL-CCNC: 16 U/L (ref 12–45)
BASOPHILS # BLD AUTO: 0.6 % (ref 0–1.8)
BASOPHILS # BLD: 0.04 K/UL (ref 0–0.12)
BILIRUB SERPL-MCNC: 0.8 MG/DL (ref 0.1–1.5)
BUN SERPL-MCNC: 16 MG/DL (ref 8–22)
CALCIUM SERPL-MCNC: 9.4 MG/DL (ref 8.5–10.5)
CHLORIDE SERPL-SCNC: 109 MMOL/L (ref 96–112)
CHOLEST SERPL-MCNC: 178 MG/DL (ref 100–199)
CO2 SERPL-SCNC: 23 MMOL/L (ref 20–33)
CREAT SERPL-MCNC: 0.81 MG/DL (ref 0.5–1.4)
EOSINOPHIL # BLD AUTO: 0.09 K/UL (ref 0–0.51)
EOSINOPHIL NFR BLD: 1.4 % (ref 0–6.9)
ERYTHROCYTE [DISTWIDTH] IN BLOOD BY AUTOMATED COUNT: 45 FL (ref 35.9–50)
FASTING STATUS PATIENT QL REPORTED: NORMAL
FERRITIN SERPL-MCNC: 170 NG/ML (ref 22–322)
FOLATE SERPL-MCNC: 20.4 NG/ML
GLOBULIN SER CALC-MCNC: 2.6 G/DL (ref 1.9–3.5)
GLUCOSE SERPL-MCNC: 93 MG/DL (ref 65–99)
HCT VFR BLD AUTO: 43.2 % (ref 42–52)
HDLC SERPL-MCNC: 53 MG/DL
HGB BLD-MCNC: 14.6 G/DL (ref 14–18)
IMM GRANULOCYTES # BLD AUTO: 0.01 K/UL (ref 0–0.11)
IMM GRANULOCYTES NFR BLD AUTO: 0.2 % (ref 0–0.9)
IRON SERPL-MCNC: 146 UG/DL (ref 50–180)
LDLC SERPL CALC-MCNC: 101 MG/DL
LYMPHOCYTES # BLD AUTO: 2.75 K/UL (ref 1–4.8)
LYMPHOCYTES NFR BLD: 44 % (ref 22–41)
MCH RBC QN AUTO: 31.9 PG (ref 27–33)
MCHC RBC AUTO-ENTMCNC: 33.8 G/DL (ref 33.7–35.3)
MCV RBC AUTO: 94.3 FL (ref 81.4–97.8)
MONOCYTES # BLD AUTO: 0.53 K/UL (ref 0–0.85)
MONOCYTES NFR BLD AUTO: 8.5 % (ref 0–13.4)
NEUTROPHILS # BLD AUTO: 2.83 K/UL (ref 1.82–7.42)
NEUTROPHILS NFR BLD: 45.3 % (ref 44–72)
NRBC # BLD AUTO: 0 K/UL
NRBC BLD-RTO: 0 /100 WBC
PLATELET # BLD AUTO: 324 K/UL (ref 164–446)
PMV BLD AUTO: 9.9 FL (ref 9–12.9)
POTASSIUM SERPL-SCNC: 4.3 MMOL/L (ref 3.6–5.5)
PREALB SERPL-MCNC: 25.4 MG/DL (ref 18–38)
PROT SERPL-MCNC: 6.9 G/DL (ref 6–8.2)
RBC # BLD AUTO: 4.58 M/UL (ref 4.7–6.1)
SODIUM SERPL-SCNC: 143 MMOL/L (ref 135–145)
TRANSFERRIN SERPL-MCNC: 236 MG/DL (ref 200–370)
TRIGL SERPL-MCNC: 122 MG/DL (ref 0–149)
VIT B12 SERPL-MCNC: 1160 PG/ML (ref 211–911)
WBC # BLD AUTO: 6.3 K/UL (ref 4.8–10.8)

## 2021-12-06 PROCEDURE — 84466 ASSAY OF TRANSFERRIN: CPT

## 2021-12-06 PROCEDURE — 80053 COMPREHEN METABOLIC PANEL: CPT

## 2021-12-06 PROCEDURE — 85025 COMPLETE CBC W/AUTO DIFF WBC: CPT

## 2021-12-06 PROCEDURE — 36415 COLL VENOUS BLD VENIPUNCTURE: CPT

## 2021-12-06 PROCEDURE — 82607 VITAMIN B-12: CPT

## 2021-12-06 PROCEDURE — 82746 ASSAY OF FOLIC ACID SERUM: CPT

## 2021-12-06 PROCEDURE — 84252 ASSAY OF VITAMIN B-2: CPT

## 2021-12-06 PROCEDURE — 84134 ASSAY OF PREALBUMIN: CPT

## 2021-12-06 PROCEDURE — 84207 ASSAY OF VITAMIN B-6: CPT

## 2021-12-06 PROCEDURE — 82306 VITAMIN D 25 HYDROXY: CPT

## 2021-12-06 PROCEDURE — 84630 ASSAY OF ZINC: CPT

## 2021-12-06 PROCEDURE — 83540 ASSAY OF IRON: CPT

## 2021-12-06 PROCEDURE — 82728 ASSAY OF FERRITIN: CPT

## 2021-12-06 PROCEDURE — 84425 ASSAY OF VITAMIN B-1: CPT

## 2021-12-06 PROCEDURE — 80061 LIPID PANEL: CPT

## 2021-12-08 LAB — 25(OH)D3 SERPL-MCNC: 35 NG/ML (ref 30–80)

## 2021-12-09 LAB
VIT B1 BLD-MCNC: 109 NMOL/L (ref 70–180)
VIT B6 SERPL-MCNC: 151.7 NMOL/L (ref 20–125)
ZINC SERPL-MCNC: 83.3 UG/DL (ref 60–120)

## 2021-12-10 LAB — VIT B2 SERPL-SCNC: 27 NMOL/L (ref 5–50)

## 2022-08-08 ENCOUNTER — OFFICE VISIT (OUTPATIENT)
Dept: MEDICAL GROUP | Facility: MEDICAL CENTER | Age: 52
End: 2022-08-08
Payer: COMMERCIAL

## 2022-08-08 VITALS
OXYGEN SATURATION: 98 % | TEMPERATURE: 97.5 F | HEIGHT: 67 IN | SYSTOLIC BLOOD PRESSURE: 124 MMHG | WEIGHT: 236.77 LBS | HEART RATE: 60 BPM | BODY MASS INDEX: 37.16 KG/M2 | DIASTOLIC BLOOD PRESSURE: 72 MMHG

## 2022-08-08 DIAGNOSIS — Z12.11 SCREENING FOR COLON CANCER: ICD-10-CM

## 2022-08-08 DIAGNOSIS — R73.03 PREDIABETES: ICD-10-CM

## 2022-08-08 DIAGNOSIS — E66.01 MORBID OBESITY WITH BMI OF 40.0-44.9, ADULT (HCC): ICD-10-CM

## 2022-08-08 DIAGNOSIS — R20.2 TINGLING: ICD-10-CM

## 2022-08-08 DIAGNOSIS — E66.09 CLASS 2 OBESITY DUE TO EXCESS CALORIES WITHOUT SERIOUS COMORBIDITY WITH BODY MASS INDEX (BMI) OF 37.0 TO 37.9 IN ADULT: ICD-10-CM

## 2022-08-08 DIAGNOSIS — J30.9 ALLERGIC SINUSITIS: ICD-10-CM

## 2022-08-08 PROBLEM — E66.812 CLASS 2 OBESITY DUE TO EXCESS CALORIES WITHOUT SERIOUS COMORBIDITY WITH BODY MASS INDEX (BMI) OF 37.0 TO 37.9 IN ADULT: Status: ACTIVE | Noted: 2018-07-13

## 2022-08-08 PROCEDURE — 99214 OFFICE O/P EST MOD 30 MIN: CPT | Performed by: FAMILY MEDICINE

## 2022-08-08 RX ORDER — FLUTICASONE PROPIONATE 50 MCG
1 SPRAY, SUSPENSION (ML) NASAL 2 TIMES DAILY
Qty: 16 G | Refills: 2 | Status: SHIPPED
Start: 2022-08-08 | End: 2022-11-28

## 2022-08-08 ASSESSMENT — PATIENT HEALTH QUESTIONNAIRE - PHQ9: CLINICAL INTERPRETATION OF PHQ2 SCORE: 0

## 2022-08-08 ASSESSMENT — FIBROSIS 4 INDEX: FIB4 SCORE: 0.57

## 2022-08-08 NOTE — ASSESSMENT & PLAN NOTE
Chronic problem. Status post gastric sleeve.   Down 50lbs.   Seems to have platued.   BMI 37, down from 44.

## 2022-08-08 NOTE — PROGRESS NOTES
Subjective:     CC: Diagnoses of Tingling, Screening for colon cancer, Morbid obesity with BMI of 40.0-44.9, adult (HCC), Prediabetes, Class 2 obesity due to excess calories without serious comorbidity with body mass index (BMI) of 37.0 to 37.9 in adult, and Allergic sinusitis were pertinent to this visit.    HPI: Patient is a 52 y.o. male established patient who presents today for general follow up and with the following concerns.       Tingling  Patient reports tingling in the fingers and feet. The feet are becoming more constant. Especially over the past few weeks. Reports some low back pain as well.   The finger tinging is mostly during the night, when he sleeps and improves with positional changes. In the hands it is only the 3rd, 4th and 5th fingers, likely ulnar nerve impingement.   Labs done in December, B vitamins high.   Tingling in the feet is mostly toes.     Prediabetes  Has lost 30lbs. BMI at 37 now.   Last A1C was 5.8%      Class 2 obesity due to excess calories without serious comorbidity with body mass index (BMI) of 37.0 to 37.9 in adult  Chronic problem. Status post gastric sleeve.   Down 50lbs.   Seems to have platued.   BMI 37, down from 44.       History reviewed. No pertinent past medical history.    Social History     Tobacco Use   • Smoking status: Former Smoker     Packs/day: 0.50     Years: 19.00     Pack years: 9.50     Types: Cigarettes     Quit date:      Years since quittin.6   • Smokeless tobacco: Never Used   Vaping Use   • Vaping Use: Never used   Substance Use Topics   • Alcohol use: Not Currently   • Drug use: No       Current Outpatient Medications Ordered in Epic   Medication Sig Dispense Refill   • fluticasone (FLONASE) 50 MCG/ACT nasal spray Administer 1 Spray into affected nostril(S) 2 times a day. 16 g 2   • Multiple Vitamins-Minerals (MENS MULTIVITAMIN) Tab Take 1 Tab by mouth every day.     • Ascorbic Acid (VITAMIN C) 1000 MG Tab Take 1 Tab by mouth every day.    "    No current [facility]-ordered facility-administered medications on file.       Allergies:  Patient has no known allergies.    Health Maintenance: Completed    Objective:       Exam:  /72 (BP Location: Left arm, Patient Position: Sitting, BP Cuff Size: Adult long)   Pulse 60   Temp 36.4 °C (97.5 °F) (Temporal)   Ht 1.702 m (5' 7\")   Wt 107 kg (236 lb 12.4 oz)   SpO2 98%   BMI 37.08 kg/m²  Body mass index is 37.08 kg/m².    General: Normal appearing. No distress.  HEAD: NCAT  EYES: conjunctiva clear, lids without ptosis, pupils equal and reactive to light  EARS: ears normal shape and contour.  MOUTH: normal dentition   Neck:  Normal ROM  Pulmonary: Normal effort. Normal respiratory rate.  Cardiovascular: Well perfused. No LE edema  Neurologic: Grossly normal, no focal deficits. Normal sensation on monofilament exam.   Skin: Warm and dry.  No obvious lesions.  Musculoskeletal: Normal gait and station.   Psych: Normal mood and affect. Alert and oriented x3. Judgment and insight is normal.      Labs: 12/6/21 Results reviewed and discussed with the patient, questions answered.    Assessment & Plan:     52 y.o. male with the following -     1. Tingling  Chronic problem. Worsening in the feet. Normal monofilament exam today.  Had labs done in December including all the B vitamins which were normal.  History of gastric sleeve.  He finds tingling bothersome.  He does have a history of prediabetes, A1c ordered.  Referral placed for EMG.  - TSH WITH REFLEX TO FT4; Future  - CBC WITH DIFFERENTIAL; Future  - Referral to Pain Clinic    2. Screening for colon cancer  - COLOGRD (FIT DNA)    3. Morbid obesity with BMI of 40.0-44.9, adult (HCC)  Chronic problem, improving.  Patient had gastric sleeve done last year.  Down 50 pounds.  BMI down to 37.  Seems to be plateaued at this point.  - Lipid Profile; Future  - Comp Metabolic Panel; Future  - HEMOGLOBIN A1C; Future    4. Prediabetes  Chronic problem, as above, A1c " ordered.  - HEMOGLOBIN A1C; Future    5. Class 2 obesity due to excess calories without serious comorbidity with body mass index (BMI) of 37.0 to 37.9 in adult    6. Allergic sinusitis  Chronic problem, prescription given for Flonase.  Advised to please stop the Afrin.  Follow-up in 1 month.  - fluticasone (FLONASE) 50 MCG/ACT nasal spray; Administer 1 Spray into affected nostril(S) 2 times a day.  Dispense: 16 g; Refill: 2      Return in about 4 weeks (around 9/5/2022), or if symptoms worsen or fail to improve.    Please note that this dictation was created using voice recognition software. I have made every reasonable attempt to correct obvious errors, but I expect that there are errors of grammar and possibly content that I did not discover before finalizing the note.

## 2022-08-08 NOTE — ASSESSMENT & PLAN NOTE
Patient reports tingling in the fingers and feet. The feet are becoming more constant. Especially over the past few weeks. Reports some low back pain as well.   The finger tinging is mostly during the night, when he sleeps and improves with positional changes. In the hands it is only the 3rd, 4th and 5th fingers, likely ulnar nerve impingement.   Labs done in December, B vitamins high.   Tingling in the feet is mostly toes.

## 2022-08-15 ENCOUNTER — OFFICE VISIT (OUTPATIENT)
Dept: PHYSICAL MEDICINE AND REHAB | Facility: MEDICAL CENTER | Age: 52
End: 2022-08-15
Payer: COMMERCIAL

## 2022-08-15 VITALS
DIASTOLIC BLOOD PRESSURE: 82 MMHG | WEIGHT: 237.66 LBS | HEIGHT: 67 IN | TEMPERATURE: 97.2 F | BODY MASS INDEX: 37.3 KG/M2 | HEART RATE: 55 BPM | OXYGEN SATURATION: 98 % | SYSTOLIC BLOOD PRESSURE: 132 MMHG

## 2022-08-15 DIAGNOSIS — R20.0 NUMBNESS AND TINGLING OF BOTH LOWER EXTREMITIES: Primary | ICD-10-CM

## 2022-08-15 DIAGNOSIS — R20.2 NUMBNESS AND TINGLING OF BOTH UPPER EXTREMITIES: ICD-10-CM

## 2022-08-15 DIAGNOSIS — R20.2 NUMBNESS AND TINGLING OF BOTH LOWER EXTREMITIES: Primary | ICD-10-CM

## 2022-08-15 DIAGNOSIS — S33.6XXA STRAIN OF SACROILIAC LIGAMENT, INITIAL ENCOUNTER: ICD-10-CM

## 2022-08-15 DIAGNOSIS — R20.0 NUMBNESS AND TINGLING OF BOTH UPPER EXTREMITIES: ICD-10-CM

## 2022-08-15 PROCEDURE — 99204 OFFICE O/P NEW MOD 45 MIN: CPT | Performed by: STUDENT IN AN ORGANIZED HEALTH CARE EDUCATION/TRAINING PROGRAM

## 2022-08-15 RX ORDER — GABAPENTIN 100 MG/1
100-300 CAPSULE ORAL NIGHTLY PRN
Qty: 90 CAPSULE | Refills: 3 | Status: SHIPPED
Start: 2022-08-15 | End: 2022-09-09

## 2022-08-15 ASSESSMENT — FIBROSIS 4 INDEX: FIB4 SCORE: 0.57

## 2022-08-15 ASSESSMENT — PATIENT HEALTH QUESTIONNAIRE - PHQ9
SUM OF ALL RESPONSES TO PHQ QUESTIONS 1-9: 6
5. POOR APPETITE OR OVEREATING: 0 - NOT AT ALL
CLINICAL INTERPRETATION OF PHQ2 SCORE: 3

## 2022-08-15 ASSESSMENT — PAIN SCALES - GENERAL: PAINLEVEL: 2=MINIMAL-SLIGHT

## 2022-08-15 NOTE — PROGRESS NOTES
New Patient Note    Interventional Pain and Spine  Physiatry (Physical Medicine and Rehabilitation)     Patient Name: Devin Montgomery  : 1970  Date of Service: 8/15/2022  PCP: Irasema Alvarez M.D.  Referring Provider: Irasema Alvarez M.D.    Chief Complaint:   Chief Complaint   Patient presents with    New Patient     Tingling       HPI  HISTORY (8/15/2022):  Devin Montgomery is a 52 y.o. RHD male  at [facility] who presents today with 3-4 weeks of numbness/tingling in his fingers (primarily 3rd-5th fingers, equal bilaterally) and toes that is more noticeable with sleeping. Numbness/tingling interferes with ability to sleep. He also endorses left sided low back pain since about 1.5 weeks ago that started with no known inciting event.     He manages the pain with bicycling in the morning which improves it. It is generally tolerable durign the day. His pain at its best-worse level during the course of the day is 2-4/10, respectively. Pain right now is 2/10 on the numeric pain scale. Pain worsens with sitting and improves with walking. The patient otherwise denies new weakness, numbness, or bladder/bowel incontinence.  Denies difficulty feeling the ground with walking.  Denies family hx of these symptoms. Has not tried medications or injections.  Denies pain radiating from neck down to hand or from low back down to feet.    The patient has not done physical therapy for this problem.    Medical history includes BMI 37, prediabetes, vitamin D deficiency    Psychological testing for pain as depression and pain commonly coexist and need to both be treated.     Opioid Risk Score: 0     Interpretation of Opioid Risk Score   Score 0-3 = Low risk of abuse. Do UDS at least once per year.  Score 4-7 = Moderate risk of abuse. Do UDS 1-4 times per year.  Score 8+ = High risk of abuse. Refer to specialist.    PHQ  Depression Screen (PHQ-2/PHQ-9) 2020 2022 8/15/2022   PHQ-2 Total  Score 0 - -   PHQ-2 Total Score - 0 3   PHQ-9 Total Score - - 6       Interpretation of PHQ-9 Total Score   Score Severity   1-4 No Depression   5-9 Mild Depression   10-14 Moderate Depression   15-19 Moderately Severe Depression   20-27 Severe Depression      Medical records review:  I reviewed the note from the referring provider Irasema Alvarez M.D. including the note dated 22.    ROS:   Red Flags ROS:   Fever, Chills, Sweats: Denies  Involuntary Weight Loss: Denies  Bladder Incontinence: Denies  Bowel Incontinence: denies  Saddle Anesthesia: Denies    All other systems reviewed and negative.     PMHx:   History reviewed. No pertinent past medical history.    PSHx:   Past Surgical History:   Procedure Laterality Date    TX LAP, CHENTE RESTRICT PROC, LONGITUDINAL GAS*  2020    Procedure: GASTRECTOMY, SLEEVE, LAPAROSCOPIC;  Surgeon: Bud Hayward M.D.;  Location: SURGERY [facility];  Service: General    TX LAP,BILIARY TRACT,UNLISTED  2020    Procedure: BIOPSY, LIVER, LAPAROSCOPIC;  Surgeon: Bud Hayward M.D.;  Location: SURGERY [facility];  Service: General    OTHER      tonsillectomy       Family Hx:   Family History   Problem Relation Age of Onset    No Known Problems Mother     No Known Problems Father     No Known Problems Sister     No Known Problems Brother     No Known Problems Sister        Social Hx:  Social History     Socioeconomic History    Marital status:      Spouse name: Not on file    Number of children: Not on file    Years of education: Not on file    Highest education level: Not on file   Occupational History    Not on file   Tobacco Use    Smoking status: Former     Packs/day: 0.50     Years: 19.00     Pack years: 9.50     Types: Cigarettes     Quit date:      Years since quittin.6    Smokeless tobacco: Never   Vaping Use    Vaping Use: Never used   Substance and Sexual Activity    Alcohol use: Not Currently    Drug use: No    Sexual activity: Yes      "Partners: Female   Other Topics Concern    Not on file   Social History Narrative    ** Merged History Encounter **          Social Determinants of Health     Financial Resource Strain: Not on file   Food Insecurity: Not on file   Transportation Needs: Not on file   Physical Activity: Not on file   Stress: Not on file   Social Connections: Not on file   Intimate Partner Violence: Not on file   Housing Stability: Not on file       Allergies:  Allergies   Allergen Reactions    Seasonal        Medications: reviewed on epic.   Outpatient Medications Marked as Taking for the 8/15/22 encounter (Office Visit) with Delfina Acharya M.D.   Medication Sig Dispense Refill    gabapentin (NEURONTIN) 100 MG Cap Take 1-3 Capsules by mouth at bedtime as needed (pain). 90 Capsule 3    fluticasone (FLONASE) 50 MCG/ACT nasal spray Administer 1 Spray into affected nostril(S) 2 times a day. 16 g 2    Multiple Vitamins-Minerals (MENS MULTIVITAMIN) Tab Take 1 Tab by mouth every day.      Ascorbic Acid (VITAMIN C) 1000 MG Tab Take 1 Tab by mouth every day.          Current Outpatient Medications on File Prior to Visit   Medication Sig Dispense Refill    fluticasone (FLONASE) 50 MCG/ACT nasal spray Administer 1 Spray into affected nostril(S) 2 times a day. 16 g 2    Multiple Vitamins-Minerals (MENS MULTIVITAMIN) Tab Take 1 Tab by mouth every day.      Ascorbic Acid (VITAMIN C) 1000 MG Tab Take 1 Tab by mouth every day.       No current facility-administered medications on file prior to visit.         EXAMINATION     Physical Exam:   /82 (BP Location: Right arm, Patient Position: Sitting, BP Cuff Size: Adult)   Pulse (!) 55   Temp 36.2 °C (97.2 °F) (Temporal)   Ht 1.702 m (5' 7\")   Wt 108 kg (237 lb 10.5 oz)   SpO2 98%     Constitutional:   Body Habitus: Body mass index is 37.22 kg/m².  Cooperation: Fully cooperates with exam  Appearance: Well-groomed, well-nourished.    Eyes: No scleral icterus to suggest severe liver disease, no " proptosis to suggest severe hyperthyroidism    ENT -no obvious auditory deficits, no noticeable facial droop     Skin -no rashes or lesions noted     Respiratory-  breathing comfortably on room air, no audible wheezing    Cardiovascular-distal extremities warm and well perfused.  No lower extremity edema is noted.     Gastrointestinal - no obvious abdominal masses, non-distended    Psychiatric- alert and oriented ×3. Normal affect.     Gait - normal gait, no use of ambulatory device, nonantalgic.     Musculoskeletal and Neuro -     Cervical spine     Key points for the international standards for neurological classification of spinal cord injury (ISNCSCI) to light touch.     Dermatome R L   C4 2 2   C5 2 2   C6 2 2   C7 2 2   C8 2 2   T1 2 2   T2 2 2       Motor Exam Upper Extremities   ? Myotome R L   Shoulder abduction C5 5 5   Elbow flexion C5 5 5   Wrist extension C6 5 5   Elbow extension C7 5 5   Finger flexion C8 5 5   Finger abduction T1 5 5     Reflexes  ?  R L   Biceps  2+ 2+   Brachioradialis  2+ 2+     Lombardo's sign negative bilaterally     Bilateral hands:   Inspection: No swelling, deformities, or rashes. Symmetric appearing thenar and hyperthenar regions bilaterally.    Special tests:  Tinel's at the wrist over the median nerve negative bilaterally  Carpal tunnel compression: negative bilaterally  Phalen's test: negative bilaterally  Tinel's at the cubital tunnel: negative bilaterally    Thoracic/Lumbar Spine/Sacral Spine/Hips   Inspection: No evidence of atrophy in bilateral lower extremities throughout     Palpation:   Tenderness to palpation over the sacroiliac joint on left. No tenderness to palpation elsewhere in the low back/hips including midline of lumbosacral spine, paraspinal muscles bilaterally, lumbar facets bilaterally, sacroiliac joint on right, PSIS bilaterally, and greater trochanters bilaterally.    Lumbar spine /hip provocative exam maneuvers  Straight leg raise negative  bilaterally  Slump-sit test negative bilaterally  FADIR test negative bilaterally    SI joint tests  JOHN test negative bilaterally  Thigh thrust test negative bilaterally  SI joint compression positive on left, negative on right  SI joint distraction negative bilaterally  Sacral thrust test negative bilaterally  Yeomans maneuver negative bilaterally  Tracy finger sign positive on left, negative on right      Key points for the international standards for neurological classification of spinal cord injury (ISNCSCI) to light touch.   Dermatome R L   L2 2 2   L3 2 2   L4 2 2   L5 2 2   S1 2 2   S2 2 2       Motor Exam Lower Extremities  ? Myotome R L   Hip flexion L2 5 5   Knee extension L3 5 5   Ankle dorsiflexion L4 5 5   Toe extension L5 5 5   Ankle plantarflexion S1 5 5       Reflexes  ?  R L   Patella  2+ 2+   Achilles   2+ 2+     Clonus of the ankle negative bilaterally       MEDICAL DECISION MAKING    Medical records review: see under HPI section.     DATA    Labs: Personally reviewed at today's visit:     Lab Results   Component Value Date/Time    SODIUM 143 12/06/2021 07:44 AM    POTASSIUM 4.3 12/06/2021 07:44 AM    CHLORIDE 109 12/06/2021 07:44 AM    CO2 23 12/06/2021 07:44 AM    ANION 11.0 12/06/2021 07:44 AM    GLUCOSE 93 12/06/2021 07:44 AM    BUN 16 12/06/2021 07:44 AM    CREATININE 0.81 12/06/2021 07:44 AM    CALCIUM 9.4 12/06/2021 07:44 AM    ASTSGOT 16 12/06/2021 07:44 AM    ALTSGPT 21 12/06/2021 07:44 AM    TBILIRUBIN 0.8 12/06/2021 07:44 AM    ALBUMIN 4.3 12/06/2021 07:44 AM    TOTPROTEIN 6.9 12/06/2021 07:44 AM    GLOBULIN 2.6 12/06/2021 07:44 AM    AGRATIO 1.7 12/06/2021 07:44 AM       Lab Results   Component Value Date/Time    PROTHROMBTM 13.3 08/28/2020 01:00 PM    INR 0.98 08/28/2020 01:00 PM        Lab Results   Component Value Date/Time    WBC 6.3 12/06/2021 07:44 AM    RBC 4.58 (L) 12/06/2021 07:44 AM    HEMOGLOBIN 14.6 12/06/2021 07:44 AM    HEMATOCRIT 43.2 12/06/2021 07:44 AM    MCV 94.3  2021 07:44 AM    MCH 31.9 2021 07:44 AM    MCHC 33.8 2021 07:44 AM    MPV 9.9 2021 07:44 AM    NEUTSPOLYS 45.30 2021 07:44 AM    LYMPHOCYTES 44.00 (H) 2021 07:44 AM    MONOCYTES 8.50 2021 07:44 AM    EOSINOPHILS 1.40 2021 07:44 AM    BASOPHILS 0.60 2021 07:44 AM        Lab Results   Component Value Date/Time    HBA1C 5.8 (H) 2021 08:17 AM        Imaging:   I personally reviewed following images, these are my reads  No pertinent imaging available for review at the time of today's visit                            Diagnosis  Visit Diagnoses     ICD-10-CM   1. Numbness and tingling of both lower extremities  R20.0    R20.2   2. Numbness and tingling of both upper extremities  R20.0    R20.2   3. Strain of sacroiliac ligament, initial encounter  S33.6XXA         ASSESSMENT AND PLAN:  Devin Montgomery ( 1970) is a male with history of prediabetes who presents with 3-4 weeks of numbness and tingling in his bilateral feet and hands with negative provocative exam testing today.  Neurologically intact on exam today.    Also with 1.5 weeks of pain at left sacroiliac area, without 3+ positive provocative exam maneuvers today    Devin was seen today for new patient.    Diagnoses and all orders for this visit:    Numbness and tingling of both lower extremities  -     Referral to Pain Clinic    Numbness and tingling of both upper extremities  -     Referral to Pain Clinic    Strain of sacroiliac ligament, initial encounter    Other orders  -     gabapentin (NEURONTIN) 100 MG Cap; Take 1-3 Capsules by mouth at bedtime as needed (pain).        PLAN  Physical Therapy: Discussed possible physical therapy referral for left sacroiliac strain which started bothering the patient 1.5 weeks ago.  At this time he does not meet diagnostic criteria for left sacroiliac joint dysfunction.  Discussed that if pain persists, he would likely benefit from referral to  physical therapy.    Diagnostic workup: no further imaging needed at this time    Medications:   - given the neuropathic component of pain, I will start gabapentin as above     Interventions: Pending EMG/NCS results    Referrals: For EMG/NCS with me, next available    Other  -Encourage patient to get blood work as ordered by PCP     Follow-up: For EMG/NCS next available    Orders Placed This Encounter    Referral to Pain Clinic    gabapentin (NEURONTIN) 100 MG Cap       Delfina Acharya MD  Interventional Pain and Spine  Physical Medicine and Rehabilitation  [facility]    Irasema Rizzo M.D.     The above note documents my personal evaluation of this patient. In addition, I have reviewed and confirmed with the patient and MA the supportive information documented in today's Patient Health Questionnaire and Office Note.     Please note that this dictation was created using voice recognition software. I have made every reasonable attempt to correct obvious errors, but I expect that there are errors of grammar and possibly content that I did not discover before finalizing the note.

## 2022-08-19 ENCOUNTER — APPOINTMENT (OUTPATIENT)
Dept: PHYSICAL MEDICINE AND REHAB | Facility: MEDICAL CENTER | Age: 52
End: 2022-08-19
Payer: COMMERCIAL

## 2022-08-19 ENCOUNTER — HOSPITAL ENCOUNTER (OUTPATIENT)
Dept: LAB | Facility: MEDICAL CENTER | Age: 52
End: 2022-08-19
Attending: FAMILY MEDICINE
Payer: COMMERCIAL

## 2022-08-19 DIAGNOSIS — R73.03 PREDIABETES: ICD-10-CM

## 2022-08-19 DIAGNOSIS — R20.2 TINGLING: ICD-10-CM

## 2022-08-19 DIAGNOSIS — E66.01 MORBID OBESITY WITH BMI OF 40.0-44.9, ADULT (HCC): ICD-10-CM

## 2022-08-19 LAB
ALBUMIN SERPL BCP-MCNC: 4.3 G/DL (ref 3.2–4.9)
ALBUMIN/GLOB SERPL: 1.7 G/DL
ALP SERPL-CCNC: 72 U/L (ref 30–99)
ALT SERPL-CCNC: 18 U/L (ref 2–50)
ANION GAP SERPL CALC-SCNC: 8 MMOL/L (ref 7–16)
AST SERPL-CCNC: 14 U/L (ref 12–45)
BASOPHILS # BLD AUTO: 0.6 % (ref 0–1.8)
BASOPHILS # BLD: 0.04 K/UL (ref 0–0.12)
BILIRUB SERPL-MCNC: 0.7 MG/DL (ref 0.1–1.5)
BUN SERPL-MCNC: 11 MG/DL (ref 8–22)
CALCIUM SERPL-MCNC: 9.3 MG/DL (ref 8.5–10.5)
CHLORIDE SERPL-SCNC: 108 MMOL/L (ref 96–112)
CHOLEST SERPL-MCNC: 170 MG/DL (ref 100–199)
CO2 SERPL-SCNC: 25 MMOL/L (ref 20–33)
CREAT SERPL-MCNC: 0.71 MG/DL (ref 0.5–1.4)
EOSINOPHIL # BLD AUTO: 0.16 K/UL (ref 0–0.51)
EOSINOPHIL NFR BLD: 2.5 % (ref 0–6.9)
ERYTHROCYTE [DISTWIDTH] IN BLOOD BY AUTOMATED COUNT: 45.5 FL (ref 35.9–50)
EST. AVERAGE GLUCOSE BLD GHB EST-MCNC: 105 MG/DL
FASTING STATUS PATIENT QL REPORTED: NORMAL
GFR SERPLBLD CREATININE-BSD FMLA CKD-EPI: 109 ML/MIN/1.73 M 2
GLOBULIN SER CALC-MCNC: 2.5 G/DL (ref 1.9–3.5)
GLUCOSE SERPL-MCNC: 83 MG/DL (ref 65–99)
HBA1C MFR BLD: 5.3 % (ref 4–5.6)
HCT VFR BLD AUTO: 44.4 % (ref 42–52)
HDLC SERPL-MCNC: 45 MG/DL
HGB BLD-MCNC: 14.8 G/DL (ref 14–18)
IMM GRANULOCYTES # BLD AUTO: 0.02 K/UL (ref 0–0.11)
IMM GRANULOCYTES NFR BLD AUTO: 0.3 % (ref 0–0.9)
LDLC SERPL CALC-MCNC: 79 MG/DL
LYMPHOCYTES # BLD AUTO: 2.72 K/UL (ref 1–4.8)
LYMPHOCYTES NFR BLD: 42.2 % (ref 22–41)
MCH RBC QN AUTO: 31.9 PG (ref 27–33)
MCHC RBC AUTO-ENTMCNC: 33.3 G/DL (ref 33.7–35.3)
MCV RBC AUTO: 95.7 FL (ref 81.4–97.8)
MONOCYTES # BLD AUTO: 0.52 K/UL (ref 0–0.85)
MONOCYTES NFR BLD AUTO: 8.1 % (ref 0–13.4)
NEUTROPHILS # BLD AUTO: 2.99 K/UL (ref 1.82–7.42)
NEUTROPHILS NFR BLD: 46.3 % (ref 44–72)
NRBC # BLD AUTO: 0 K/UL
NRBC BLD-RTO: 0 /100 WBC
PLATELET # BLD AUTO: 302 K/UL (ref 164–446)
PMV BLD AUTO: 10 FL (ref 9–12.9)
POTASSIUM SERPL-SCNC: 4.2 MMOL/L (ref 3.6–5.5)
PROT SERPL-MCNC: 6.8 G/DL (ref 6–8.2)
RBC # BLD AUTO: 4.64 M/UL (ref 4.7–6.1)
SODIUM SERPL-SCNC: 141 MMOL/L (ref 135–145)
TRIGL SERPL-MCNC: 231 MG/DL (ref 0–149)
TSH SERPL DL<=0.005 MIU/L-ACNC: 1.89 UIU/ML (ref 0.38–5.33)
WBC # BLD AUTO: 6.5 K/UL (ref 4.8–10.8)

## 2022-08-19 PROCEDURE — 36415 COLL VENOUS BLD VENIPUNCTURE: CPT

## 2022-08-19 PROCEDURE — 80061 LIPID PANEL: CPT

## 2022-08-19 PROCEDURE — 83036 HEMOGLOBIN GLYCOSYLATED A1C: CPT

## 2022-08-19 PROCEDURE — 85025 COMPLETE CBC W/AUTO DIFF WBC: CPT

## 2022-08-19 PROCEDURE — 84443 ASSAY THYROID STIM HORMONE: CPT

## 2022-08-19 PROCEDURE — 80053 COMPREHEN METABOLIC PANEL: CPT

## 2022-09-02 ENCOUNTER — OFFICE VISIT (OUTPATIENT)
Dept: PHYSICAL MEDICINE AND REHAB | Facility: MEDICAL CENTER | Age: 52
End: 2022-09-02
Payer: COMMERCIAL

## 2022-09-02 DIAGNOSIS — R20.0 NUMBNESS AND TINGLING OF BOTH UPPER EXTREMITIES: ICD-10-CM

## 2022-09-02 DIAGNOSIS — R20.2 NUMBNESS AND TINGLING OF BOTH UPPER EXTREMITIES: ICD-10-CM

## 2022-09-02 DIAGNOSIS — R20.0 NUMBNESS AND TINGLING OF BOTH LOWER EXTREMITIES: ICD-10-CM

## 2022-09-02 DIAGNOSIS — R20.2 NUMBNESS AND TINGLING OF BOTH LOWER EXTREMITIES: ICD-10-CM

## 2022-09-02 PROCEDURE — 95913 NRV CNDJ TEST 13/> STUDIES: CPT | Performed by: STUDENT IN AN ORGANIZED HEALTH CARE EDUCATION/TRAINING PROGRAM

## 2022-09-02 ASSESSMENT — FIBROSIS 4 INDEX: FIB4 SCORE: 0.58

## 2022-09-02 ASSESSMENT — PATIENT HEALTH QUESTIONNAIRE - PHQ9: CLINICAL INTERPRETATION OF PHQ2 SCORE: 0

## 2022-09-02 ASSESSMENT — PAIN SCALES - GENERAL: PAINLEVEL: NO PAIN

## 2022-09-02 NOTE — Clinical Note
Hi Dr. Alvarez,  I just completed Devin's EMG.  It was normal without evidence of peripheral polyneuropathy or nerve damage.   I discussed the findings with him and encouraged him to follow-up with you.  I discussed that he could consider a podiatry referral for insoles to potentially help with the burning sensation in his feet.   Additionally he noted right shoulder pain that appeared to be consistent with right shoulder impingement on exam.  I discussed that he could try topical diclofenac gel and consider a referral to physical therapy if his symptoms persist.  I will see him back as needed.   Thanks, Delfina

## 2022-09-02 NOTE — PROCEDURES
"          Test Date:  2022    Patient: Devin Montgomery : 1970 Physician: Delfina Acharya   Sex: Male Height: ' \" Ref Phys: Delfina Patrizia   ID#: [id number] Weight:  kg Technician:      Patient Complaints:  52-year-old male  who presents with approximately 5-6 weeks of numbness and tingling in his fingers (primarily 3rd-5th fingers, equal bilaterally) and burning discomfort in his toes. Denies weakness or pain radiating from neck down to hand or from low back down to feet.     Patient Exam:  Gait - normal gait, no use of ambulatory device, nonantalgic.      Musculoskeletal and Neuro -       Key points for the international standards for neurological classification of spinal cord injury (ISNCSCI) to light touch.      Dermatome R L   C4 2 2   C5 2 2   C6 2 2   C7 2 2   C8 2 2   T1 2 2   T2 2 2         Motor Exam Upper Extremities   ? Myotome R L   Shoulder abduction C5 5 5   Elbow flexion C5 5 5   Wrist extension C6 5 5   Elbow extension C7 5 5   Finger flexion C8 5 5   Finger abduction T1 5 5      Reflexes  ?   R L   Biceps   2+ 2+   Brachioradialis   2+ 2+      Lombardo's sign negative bilaterally      Bilateral hands:   Inspection: No swelling, deformities, or rashes. Symmetric appearing thenar and hyperthenar regions bilaterally.     Special tests:  Tinel's at the wrist over the median nerve negative bilaterally  Carpal tunnel compression: negative bilaterally  Phalen's test: negative bilaterally  Tinel's at the cubital tunnel: negative bilaterally     Thoracic/Lumbar Spine/Sacral Spine/Hips   Inspection: No evidence of atrophy in bilateral lower extremities throughout      Palpation:   Tenderness to palpation over the sacroiliac joint on left. No tenderness to palpation elsewhere in the low back/hips including midline of lumbosacral spine, paraspinal muscles bilaterally, lumbar facets bilaterally, sacroiliac joint on right, PSIS bilaterally, and greater trochanters bilaterally.     Lumbar spine /hip " provocative exam maneuvers  Straight leg raise negative bilaterally  Slump-sit test negative bilaterally         Key points for the international standards for neurological classification of spinal cord injury (ISNCSCI) to light touch.   Dermatome R L   L2 2 2   L3 2 2   L4 2 2   L5 2 2   S1 2 2   S2 2 2         Motor Exam Lower Extremities  ? Myotome R L   Hip flexion L2 5 5   Knee extension L3 5 5   Ankle dorsiflexion L4 5 5   Toe extension L5 5 5   Ankle plantarflexion S1 5 5         Reflexes  ?   R L   Patella   2+ 2+   Achilles    2+ 2+      Clonus of the ankle negative bilaterally     Temperature remained above 28 C in the bilateral lower extremities and above 32 C in the bilateral upper extremities for the duration of today's study    NCV & EMG Findings:  All nerve conduction studies (as indicated in the following tables) were within normal limits.  All left vs. right side differences were within normal limits.      Impression:  This is an normal electrodiagnostic study.  There is no electrodiagnostic evidence of a peripheral polyneuropathy, sural mononeuropathy, tibial mononeuropathy, peroneal mononeuropathy, median mononeuropathy, or ulnar mononeuropathy on either side.  Additionally there is no electrodiagnostic suggestion of cervical radiculopathy, brachial plexopathy lumbar radiculopathy, or lumbar plexopathy.    Recommendations:  Patient was informed of findings and encouraged to follow-up with primary care provider  A copy of today's results was shared with the patient's primary care provider    ___________________________  Delfina Acharya MD  Physical Medicine and Rehabilitation  [facility]          Nerve Conduction Studies  Anti Sensory Summary Table     Stim Site NR Peak (ms) Norm Peak (ms) P-T Amp (µV) Norm P-T Amp Site1 Site2 Delta-P (ms) Dist (cm) Fausto (m/s) Norm Fausto (m/s)   Left Median Anti Sensory (2nd Digit)   Wrist    3.4 <3.6 23.3 >10 Wrist 2nd Digit 3.4 14.0 41 >39   Right Median Anti  Sensory (2nd Digit)   Wrist    3.2 <3.6 15.6 >10 Wrist 2nd Digit 3.2 14.0 44 >39   Left Sural Anti Sensory (Lat Mall)   Calf    3.7 <4.0 25.6 >5.0 Calf Lat Mall 3.7 14.0 38 >35   Right Sural Anti Sensory (Lat Mall)   Calf    3.6 <4.0 31.5 >5.0 Calf Lat Mall 3.6 14.0 39 >35   Left Ulnar Anti Sensory (5th Digit)   Wrist    3.6 <3.7 45.1 >15.0 Wrist 5th Digit 3.6 14.0 39 >38   Right Ulnar Anti Sensory (5th Digit)   Wrist    3.3 <3.7 27.4 >15.0 Wrist 5th Digit 3.3 14.0 42 >38     Motor Summary Table     Stim Site NR Onset (ms) Norm Onset (ms) O-P Amp (mV) Norm O-P Amp Site1 Site2 Delta-0 (ms) Dist (cm) Fausto (m/s) Norm Fausto (m/s)   Left Fibular Motor (Ext Dig Brev)   Ankle    5.3 <6.1 3.9 >2.5 B Fib Ankle 5.3 26.0 49 >38   B Fib    10.6  3.4  Poplt B Fib 2.0 10.0 50 >40   Poplt    12.6  5.9          Right Fibular Motor (Ext Dig Brev)   Ankle    4.5 <6.1 3.3 >2.5 B Fib Ankle 6.0 28.0 47 >38   B Fib    10.5  2.9  Poplt B Fib 1.2 8.0 67 >40   Poplt    11.7  3.2          Left Median Motor (Abd Poll Brev)   Wrist    3.7 <4.2 11.5 >5 Elbow Wrist 4.4 27.0 61 >50   Elbow    8.1  11.4          Right Median Motor (Abd Poll Brev)   Wrist    3.3 <4.2 10.2 >5 Elbow Wrist 4.5 26.0 58 >50   Elbow    7.8  9.6          Left Tibial Motor (Abd Guajardo Brev)   Ankle    6.1 <6.1 6.3 >3.0 Knee Ankle 6.9 37.0 54 >35   Knee    13.0  5.4          Right Tibial Motor (Abd Guajardo Brev)   Ankle    5.5 <6.1 6.2 >3.0 Knee Ankle 6.6 35.5 54 >35   Knee    12.1  8.5          Left Ulnar Motor (Abd Dig Min)   Wrist    3.1 <4.2 7.2 >3 B Elbow Wrist 3.9 21.5 55 >53   B Elbow    7.0  6.9  A Elbow B Elbow 1.8 13.0 72 >53   A Elbow    8.8  6.4          Right Ulnar Motor (Abd Dig Min)   Wrist    3.0 <4.2 7.2 >3 B Elbow Wrist 3.5 20.5 59 >53   B Elbow    6.5  6.3  A Elbow B Elbow 1.6 9.0 56 >53   A Elbow    8.1  6.7                  Waveforms:                                Ultrasound Images:

## 2022-09-09 ENCOUNTER — OFFICE VISIT (OUTPATIENT)
Dept: MEDICAL GROUP | Facility: MEDICAL CENTER | Age: 52
End: 2022-09-09
Payer: COMMERCIAL

## 2022-09-09 VITALS
WEIGHT: 235 LBS | TEMPERATURE: 97.5 F | HEART RATE: 63 BPM | HEIGHT: 67 IN | DIASTOLIC BLOOD PRESSURE: 78 MMHG | SYSTOLIC BLOOD PRESSURE: 124 MMHG | BODY MASS INDEX: 36.88 KG/M2 | OXYGEN SATURATION: 100 %

## 2022-09-09 DIAGNOSIS — E66.09 CLASS 2 OBESITY DUE TO EXCESS CALORIES WITHOUT SERIOUS COMORBIDITY WITH BODY MASS INDEX (BMI) OF 37.0 TO 37.9 IN ADULT: ICD-10-CM

## 2022-09-09 DIAGNOSIS — G89.29 CHRONIC RIGHT SHOULDER PAIN: ICD-10-CM

## 2022-09-09 DIAGNOSIS — M54.50 CHRONIC MIDLINE LOW BACK PAIN, UNSPECIFIED WHETHER SCIATICA PRESENT: ICD-10-CM

## 2022-09-09 DIAGNOSIS — G89.29 CHRONIC MIDLINE LOW BACK PAIN, UNSPECIFIED WHETHER SCIATICA PRESENT: ICD-10-CM

## 2022-09-09 DIAGNOSIS — R20.2 TINGLING: ICD-10-CM

## 2022-09-09 DIAGNOSIS — R73.03 PREDIABETES: ICD-10-CM

## 2022-09-09 DIAGNOSIS — M25.511 CHRONIC RIGHT SHOULDER PAIN: ICD-10-CM

## 2022-09-09 DIAGNOSIS — E78.00 PURE HYPERCHOLESTEROLEMIA: ICD-10-CM

## 2022-09-09 PROBLEM — Z90.3 HISTORY OF SLEEVE GASTRECTOMY: Status: ACTIVE | Noted: 2022-09-09

## 2022-09-09 PROCEDURE — 99214 OFFICE O/P EST MOD 30 MIN: CPT | Performed by: FAMILY MEDICINE

## 2022-09-09 RX ORDER — CHOLECALCIFEROL (VITAMIN D3) 1250 MCG
CAPSULE ORAL
COMMUNITY
End: 2022-11-28

## 2022-09-09 RX ORDER — CALCIUM CARBONATE 500(1250)
TABLET ORAL
COMMUNITY
End: 2022-11-28

## 2022-09-09 RX ORDER — ASCORBIC ACID 100 MG
TABLET,CHEWABLE ORAL
COMMUNITY
End: 2022-09-09

## 2022-09-09 ASSESSMENT — FIBROSIS 4 INDEX: FIB4 SCORE: 0.58

## 2022-09-09 NOTE — ASSESSMENT & PLAN NOTE
Chronic problem. EMG was normal. Has had improvement in syptoms since last visit. TSH normal.   Was given gabapentin, but felt tired the next day and symptoms improving so didn't want to take it again.

## 2022-09-09 NOTE — PROGRESS NOTES
Subjective:     CC: Diagnoses of Class 2 obesity due to excess calories without serious comorbidity with body mass index (BMI) of 37.0 to 37.9 in adult, Prediabetes, Tingling, and Pure hypercholesterolemia were pertinent to this visit.    HPI: Patient is a 52 y.o. male established patient who presents today to follow up on labs.       Class 2 obesity due to excess calories without serious comorbidity with body mass index (BMI) of 37.0 to 37.9 in adult  Chronic problem. Down 50lbs since gastric sleeve    Prediabetes  Resolved with weight loss. A1C 5.3%.     Tingling  Chronic problem. EMG was normal. Has had improvement in syptoms since last visit. TSH normal.   Was given gabapentin, but felt tired the next day and symptoms improving so didn't want to take it again.     Pure hypercholesterolemia  LDL improving  TG high    History reviewed. No pertinent past medical history.    Social History     Tobacco Use    Smoking status: Former     Packs/day: 0.50     Years: 19.00     Pack years: 9.50     Types: Cigarettes     Quit date:      Years since quittin.7    Smokeless tobacco: Never   Vaping Use    Vaping Use: Never used   Substance Use Topics    Alcohol use: Not Currently    Drug use: No       Current Outpatient Medications Ordered in Epic   Medication Sig Dispense Refill    calcium carbonate (OS-RUDY 500) 500 MG Tab       Multiple Vitamins-Minerals (MULTIVITAMIN ADULT EXTRA C PO)       Cholecalciferol (VITAMIN D3) 1.25 MG (59305 UT) Cap       fluticasone (FLONASE) 50 MCG/ACT nasal spray Administer 1 Spray into affected nostril(S) 2 times a day. 16 g 2    Multiple Vitamins-Minerals (MENS MULTIVITAMIN) Tab Take 1 Tab by mouth every day.      Ascorbic Acid (VITAMIN C) 1000 MG Tab Take 1 Tab by mouth every day.       No current [facility]-ordered facility-administered medications on file.       Allergies:  Seasonal    Health Maintenance: Completed        Objective:       Exam:  /78 (BP Location: Left arm, Patient  "Position: Sitting, BP Cuff Size: Adult long)   Pulse 63   Temp 36.4 °C (97.5 °F) (Temporal)   Ht 1.702 m (5' 7\")   Wt 107 kg (235 lb)   SpO2 100%   BMI 36.81 kg/m²  Body mass index is 36.81 kg/m².    General: Normal appearing. No distress.  HEAD: NCAT  EYES: conjunctiva clear, lids without ptosis, pupils equal and reactive to light  Neck:  Normal ROM  Pulmonary: Normal effort. Normal respiratory rate.  Cardiovascular: Well perfused. No LE edema  Neurologic: Grossly normal, no focal deficits  Skin: Warm and dry.  No obvious lesions.  Musculoskeletal: Normal gait and station.   Psych: Normal mood and affect. Alert and oriented x3. Judgment and insight is normal.       Labs: 8/19/22 Results reviewed and discussed with the patient, questions answered.    Assessment & Plan:     52 y.o. male with the following -     1. Class 2 obesity due to excess calories without serious comorbidity with body mass index (BMI) of 37.0 to 37.9 in adult  Chronic problem, down 50 pounds since gastric sleeve.  Labs reviewed, improving significantly since weight loss.  Reviewed diet and exercise again today.    2. Prediabetes  Chronic problem, A1c now in normal range at 5.3%.    3. Tingling  Chronic problem, seems to be improving.  Did get EMG, was completely normal.  Continues to right shoulder pain, chronic.  Requesting referral to physical therapy for the shoulder pain.    4. Pure hypercholesterolemia  Chronic problem, LDL improving.  Plan to continue to monitor.      Return in about 3 months (around 12/9/2022).    Please note that this dictation was created using voice recognition software. I have made every reasonable attempt to correct obvious errors, but I expect that there are errors of grammar and possibly content that I did not discover before finalizing the note.        "

## 2022-09-28 ENCOUNTER — HOSPITAL ENCOUNTER (EMERGENCY)
Facility: MEDICAL CENTER | Age: 52
End: 2022-09-28
Attending: EMERGENCY MEDICINE
Payer: COMMERCIAL

## 2022-09-28 ENCOUNTER — APPOINTMENT (OUTPATIENT)
Dept: RADIOLOGY | Facility: MEDICAL CENTER | Age: 52
End: 2022-09-28
Attending: EMERGENCY MEDICINE
Payer: COMMERCIAL

## 2022-09-28 VITALS
HEART RATE: 57 BPM | SYSTOLIC BLOOD PRESSURE: 133 MMHG | RESPIRATION RATE: 16 BRPM | OXYGEN SATURATION: 98 % | TEMPERATURE: 98.3 F | BODY MASS INDEX: 36.96 KG/M2 | HEIGHT: 67 IN | DIASTOLIC BLOOD PRESSURE: 82 MMHG | WEIGHT: 235.45 LBS

## 2022-09-28 DIAGNOSIS — R10.13 EPIGASTRIC PAIN: Primary | ICD-10-CM

## 2022-09-28 LAB
ALBUMIN SERPL BCP-MCNC: 4.5 G/DL (ref 3.2–4.9)
ALBUMIN/GLOB SERPL: 1.6 G/DL
ALP SERPL-CCNC: 90 U/L (ref 30–99)
ALT SERPL-CCNC: 115 U/L (ref 2–50)
ANION GAP SERPL CALC-SCNC: 12 MMOL/L (ref 7–16)
APPEARANCE UR: CLEAR
AST SERPL-CCNC: 162 U/L (ref 12–45)
BASOPHILS # BLD AUTO: 0.4 % (ref 0–1.8)
BASOPHILS # BLD: 0.04 K/UL (ref 0–0.12)
BILIRUB SERPL-MCNC: 0.9 MG/DL (ref 0.1–1.5)
BILIRUB UR QL STRIP.AUTO: NEGATIVE
BUN SERPL-MCNC: 16 MG/DL (ref 8–22)
CALCIUM SERPL-MCNC: 9.3 MG/DL (ref 8.5–10.5)
CHLORIDE SERPL-SCNC: 107 MMOL/L (ref 96–112)
CO2 SERPL-SCNC: 23 MMOL/L (ref 20–33)
COLOR UR: NORMAL
CREAT SERPL-MCNC: 0.72 MG/DL (ref 0.5–1.4)
EKG IMPRESSION: NORMAL
EOSINOPHIL # BLD AUTO: 0.05 K/UL (ref 0–0.51)
EOSINOPHIL NFR BLD: 0.5 % (ref 0–6.9)
ERYTHROCYTE [DISTWIDTH] IN BLOOD BY AUTOMATED COUNT: 43.9 FL (ref 35.9–50)
GFR SERPLBLD CREATININE-BSD FMLA CKD-EPI: 109 ML/MIN/1.73 M 2
GLOBULIN SER CALC-MCNC: 2.8 G/DL (ref 1.9–3.5)
GLUCOSE SERPL-MCNC: 120 MG/DL (ref 65–99)
GLUCOSE UR STRIP.AUTO-MCNC: NEGATIVE MG/DL
HCT VFR BLD AUTO: 43 % (ref 42–52)
HGB BLD-MCNC: 14.8 G/DL (ref 14–18)
IMM GRANULOCYTES # BLD AUTO: 0.05 K/UL (ref 0–0.11)
IMM GRANULOCYTES NFR BLD AUTO: 0.5 % (ref 0–0.9)
KETONES UR STRIP.AUTO-MCNC: NEGATIVE MG/DL
LEUKOCYTE ESTERASE UR QL STRIP.AUTO: NEGATIVE
LIPASE SERPL-CCNC: 34 U/L (ref 11–82)
LYMPHOCYTES # BLD AUTO: 1.61 K/UL (ref 1–4.8)
LYMPHOCYTES NFR BLD: 17 % (ref 22–41)
MCH RBC QN AUTO: 31.8 PG (ref 27–33)
MCHC RBC AUTO-ENTMCNC: 34.4 G/DL (ref 33.7–35.3)
MCV RBC AUTO: 92.5 FL (ref 81.4–97.8)
MICRO URNS: NORMAL
MONOCYTES # BLD AUTO: 0.73 K/UL (ref 0–0.85)
MONOCYTES NFR BLD AUTO: 7.7 % (ref 0–13.4)
NEUTROPHILS # BLD AUTO: 7 K/UL (ref 1.82–7.42)
NEUTROPHILS NFR BLD: 73.9 % (ref 44–72)
NITRITE UR QL STRIP.AUTO: NEGATIVE
NRBC # BLD AUTO: 0 K/UL
NRBC BLD-RTO: 0 /100 WBC
PH UR STRIP.AUTO: 5.5 [PH] (ref 5–8)
PLATELET # BLD AUTO: 319 K/UL (ref 164–446)
PMV BLD AUTO: 9.5 FL (ref 9–12.9)
POTASSIUM SERPL-SCNC: 3.8 MMOL/L (ref 3.6–5.5)
PROT SERPL-MCNC: 7.3 G/DL (ref 6–8.2)
PROT UR QL STRIP: NEGATIVE MG/DL
RBC # BLD AUTO: 4.65 M/UL (ref 4.7–6.1)
RBC UR QL AUTO: NEGATIVE
SODIUM SERPL-SCNC: 142 MMOL/L (ref 135–145)
SP GR UR STRIP.AUTO: 1.03
TROPONIN T SERPL-MCNC: <6 NG/L (ref 6–19)
UROBILINOGEN UR STRIP.AUTO-MCNC: 1 MG/DL
WBC # BLD AUTO: 9.5 K/UL (ref 4.8–10.8)

## 2022-09-28 PROCEDURE — 93005 ELECTROCARDIOGRAM TRACING: CPT | Performed by: EMERGENCY MEDICINE

## 2022-09-28 PROCEDURE — 84484 ASSAY OF TROPONIN QUANT: CPT

## 2022-09-28 PROCEDURE — 80053 COMPREHEN METABOLIC PANEL: CPT

## 2022-09-28 PROCEDURE — 81003 URINALYSIS AUTO W/O SCOPE: CPT

## 2022-09-28 PROCEDURE — 36415 COLL VENOUS BLD VENIPUNCTURE: CPT

## 2022-09-28 PROCEDURE — 700102 HCHG RX REV CODE 250 W/ 637 OVERRIDE(OP): Performed by: EMERGENCY MEDICINE

## 2022-09-28 PROCEDURE — 99284 EMERGENCY DEPT VISIT MOD MDM: CPT

## 2022-09-28 PROCEDURE — A9270 NON-COVERED ITEM OR SERVICE: HCPCS | Performed by: EMERGENCY MEDICINE

## 2022-09-28 PROCEDURE — 76705 ECHO EXAM OF ABDOMEN: CPT

## 2022-09-28 PROCEDURE — 83690 ASSAY OF LIPASE: CPT

## 2022-09-28 PROCEDURE — 85025 COMPLETE CBC W/AUTO DIFF WBC: CPT

## 2022-09-28 PROCEDURE — 71046 X-RAY EXAM CHEST 2 VIEWS: CPT

## 2022-09-28 RX ORDER — OMEPRAZOLE 40 MG/1
40 CAPSULE, DELAYED RELEASE ORAL DAILY
Qty: 30 CAPSULE | Refills: 0 | Status: SHIPPED | OUTPATIENT
Start: 2022-09-28 | End: 2022-11-28

## 2022-09-28 RX ADMIN — LIDOCAINE HYDROCHLORIDE 30 ML: 20 SOLUTION OROPHARYNGEAL at 02:41

## 2022-09-28 ASSESSMENT — ENCOUNTER SYMPTOMS
NAUSEA: 0
SHORTNESS OF BREATH: 0
CONSTIPATION: 0
VOMITING: 0
DIARRHEA: 0
ABDOMINAL PAIN: 1
FEVER: 0
CHILLS: 0

## 2022-09-28 ASSESSMENT — PAIN DESCRIPTION - PAIN TYPE: TYPE: ACUTE PAIN

## 2022-09-28 ASSESSMENT — FIBROSIS 4 INDEX: FIB4 SCORE: 0.58

## 2022-09-28 ASSESSMENT — PAIN DESCRIPTION - DESCRIPTORS: DESCRIPTORS: TENDER

## 2022-09-28 NOTE — ED TRIAGE NOTES
"Chief Complaint   Patient presents with    Abdominal Pain     Reports it started this afternoon, he started to feel a shake after drinking a red bull, pt reports he started having epigastric pain radiating to the back. Pt reports the pain feels like tenderness abdominal region and lower back  on generalized. Pt denies history of kidney stones or ulcer. History of a gastric sleeve a couple years ago. Denies N/V/D. GCS 15.        52M to triage with SO for above complaint.     Pt is alert and oriented, speaking in full sentences, follows commands and responds appropriately to questions. NAD. Resp are even and unlabored.      Pt placed in lobby. Pt educated on triage process. Pt encouraged to alert staff for any changes.     Patient and staff wearing appropriate PPE    BP (!) 143/92   Pulse 88   Temp 36.4 °C (97.6 °F) (Oral)   Resp 16   Ht 1.702 m (5' 7\")   Wt 107 kg (235 lb 7.2 oz)   SpO2 99%   BMI 36.88 kg/m²    "

## 2022-09-28 NOTE — ED PROVIDER NOTES
ED Provider Note    Scribed for RAMANA Thompson II* by Adeline Barrera. 9/28/2022  1:51 AM    Means of Arrival: walk-in  History obtained by: patient  Limitations: none    CHIEF COMPLAINT  Chief Complaint   Patient presents with    Abdominal Pain     Reports it started this afternoon, he started to feel a shake after drinking a red bull, pt reports he started having epigastric pain radiating to the back. Pt reports the pain feels like tenderness abdominal region and lower back  on generalized. Pt denies history of kidney stones or ulcer. History of a gastric sleeve a couple years ago. Denies N/V/D. GCS 15.        HPI  Devin Montgomery is a 52 y.o. male who presents to the Emergency Department for evaluation of epigastric abdominal pain onset last night. Last night around 8 PM, Devin was drinking a red bull when he suddenly developed moderate epigastric abdominal pain. His pain does radiate toward his back on the left side. He denies that his pain is exacerbated after eating or with walking. He denies any leg swelling, nausea, vomiting, diarrhea, constipation, shortness of breath, fever or chills. No alleviating or exacerbating factors were reported. He does not drink alcohol. He has no cardiac history. He has a history of a gastric sleeve surgery.     Reviewed old medical records on 9/2/2020 shows he had a sleeve gastrectomy preformed by Dr. Hayward. During surgery he thought the liver look abnormal and he took a biopsy. Biopsy showed he had fatty liver disease.     REVIEW OF SYSTEMS  Review of Systems   Constitutional:  Negative for chills and fever.   Respiratory:  Negative for shortness of breath.    Cardiovascular:  Negative for leg swelling.   Gastrointestinal:  Positive for abdominal pain. Negative for constipation, diarrhea, nausea and vomiting.   All other systems reviewed and are negative.  See HPI for further details.    PAST MEDICAL HISTORY   No cardiac history    SOCIAL HISTORY  Social  "History     Tobacco Use    Smoking status: Former     Packs/day: 0.50     Years: 19.00     Pack years: 9.50     Types: Cigarettes     Quit date: 2000     Years since quittin.7    Smokeless tobacco: Never   Vaping Use    Vaping Use: Never used   Substance and Sexual Activity    Alcohol use: Not Currently    Drug use: No    Sexual activity: Yes     Partners: Female       SURGICAL HISTORY   has a past surgical history that includes other (); lap, melissa restrict proc, longitudinal gas* (2020); and lap,biliary tract,unlisted (2020).    CURRENT MEDICATIONS  Home Medications       Reviewed by Faith Montgomery R.N. (Registered Nurse) on 22 at 0041  Med List Status: Partial     Medication Last Dose Status   Ascorbic Acid (VITAMIN C) 1000 MG Tab  Active   calcium carbonate (OS-RUDY 500) 500 MG Tab  Active   Cholecalciferol (VITAMIN D3) 1.25 MG (59925 UT) Cap  Active   fluticasone (FLONASE) 50 MCG/ACT nasal spray  Active   Multiple Vitamins-Minerals (MENS MULTIVITAMIN) Tab  Active   Multiple Vitamins-Minerals (MULTIVITAMIN ADULT EXTRA C PO)  Active                    ALLERGIES  Allergies   Allergen Reactions    Seasonal        PHYSICAL EXAM  VITAL SIGNS: /82   Pulse 71   Temp 36.4 °C (97.6 °F) (Oral)   Resp 16   Ht 1.702 m (5' 7\")   Wt 107 kg (235 lb 7.2 oz)   SpO2 97%   BMI 36.88 kg/m²     Pulse ox interpretation: I interpret this pulse ox as normal.  Constitutional: Alert pleasant 52 y.o. male laying in ED bed in no apparent distress.   HENT: No signs of trauma, Bilateral external ears normal, Nose normal.   Eyes: Pupils are equal, Conjunctiva normal, Non-icteric.   Neck: Normal range of motion, No tenderness, Supple, No stridor.   Cardiovascular: Regular rate and rhythm, no murmurs. Symmetric distal pulses. No cyanosis of extremities. No peripheral edema of extremities.  Thorax & Lungs: Normal breath sounds, No respiratory distress, No wheezing, No chest tenderness.   Abdomen: Bowel " sounds normal, Soft, Unable to reproduce any tenderness to palpation, non distended, No masses, No pulsatile masses. No peritoneal signs.  Skin: Warm, Dry, No erythema, No rash.   Back: No midline bony tenderness, No CVA tenderness.   Musculoskeletal: Good range of motion in all major joints. No tenderness to palpation or major deformities noted.   Neurologic: Alert , Normal motor function, Normal sensory function, No focal deficits noted.   Psychiatric: Affect normal, Judgment normal, Mood normal.       DIAGNOSTIC STUDIES / PROCEDURES    EKG Interpretation:  Interpreted by me  Results for orders placed or performed during the hospital encounter of 22   EKG   Result Value Ref Range    Report       [facility] Emergency Dept.    Test Date:  2022  Pt Name:    JORI LANG       Department: ER  MRN:        [id number]                      Room:       RD 02 H  Gender:     Male                         Technician: [id number]  :        1970                   Requested By:GODWIN GARCIA II  Order #:    [id number]                    Reading MD: Godwin Garcia II, MD    Measurements  Intervals                                Axis  Rate:       63                           P:          38  MS:         179                          QRS:        45  QRSD:       92                           T:          32  QT:         402  QTc:        412    Interpretive Statements  Sinus rhythm  Rate 63  Normal intervals  No st elevation or depression. Normal twaves.  Impression: Normal sinus rhythm EKG  Compared to ECG 2020 12:16:01  No significant changes  Electronically Signed On 2022 4:49:17 PDT by Godwin Garcia II, MD         LABS  Pertinent Labs & Imaging studies reviewed. (See chart for details)  Labs Reviewed   CBC WITH DIFFERENTIAL - Abnormal; Notable for the following components:       Result Value    RBC 4.65 (*)     Neutrophils-Polys 73.90 (*)     Lymphocytes 17.00 (*)     All  other components within normal limits   COMP METABOLIC PANEL - Abnormal; Notable for the following components:    Glucose 120 (*)     AST(SGOT) 162 (*)     ALT(SGPT) 115 (*)     All other components within normal limits   LIPASE   URINALYSIS   ESTIMATED GFR   TROPONIN     RADIOLOGY  US-RUQ   Final Result         1.  Unremarkable right upper quadrant exam.      Note: Examination is technically limited due to patient difficulty following instructions.      DX-CHEST-2 VIEWS   Final Result         1.  No acute cardiopulmonary disease.        Pertinent Labs & Imaging studies reviewed. (See chart for details)    COURSE & MEDICAL DECISION MAKING  Pertinent Labs & Imaging studies reviewed. (See chart for details)    1:51 AM This is a 52 y.o. male who presents with epigastric abdominal pain and the differential diagnosis includes but is not limited to Gastritis, pancreatitis, bilary colic, MI. Per protocol triage ordered for Lipase, CMP, CBC with diff, and Lipase. Given his age and location of pain I ordered for an EKG, DX-Chest, and Troponin to further evaluate his symptoms.    2:31 AM Blood counts are normal. AST is 162 and ALT is 115.  UA normal. Will evaluate further for possible gallstones, choledocolithiasis. Ordered for US-RUQ. Metabolic panel is otherwise normal. Lipase is normal. UA shows no signs of infection. Patient will be treated with GI cocktail 30 mL oral suspension for his pain.     4:47 AM  Right upper quadrant ultrasound unremarkable.  No stones.  Given past liver biopsy the mild increase in transaminases likely due to fatty liver disease.  His pain has improved after GI cocktail.  Will have him start taking antacids.  Recommended a bland diet as his symptoms could be related to gastritis.  Labs did not suggest any severe underlying disease.  Troponin undetectable.  His heart score is 1.     The patient will return for worsening symptoms and is stable at the time of discharge. The patient verbalizes  understanding and will comply. Guidance provided on appropriate use of medications.        FINAL IMPRESSION  1. Epigastric pain Active         IAdeline (Scribe), am scribing for, and in the presence of, NAGA Thompson II.    Electronically signed by: Adeline Barrera (Scribe), 9/28/2022    Godwin LEWIS II, M* personally performed the services described in this documentation, as scribed by Adeline Barrera in my presence, and it is both accurate and complete.    The note accurately reflects work and decisions made by me.  Godwin Vaca II, M.D.  9/28/2022  4:48 AM

## 2022-11-28 ENCOUNTER — OFFICE VISIT (OUTPATIENT)
Dept: URGENT CARE | Facility: PHYSICIAN GROUP | Age: 52
End: 2022-11-28
Payer: COMMERCIAL

## 2022-11-28 VITALS
WEIGHT: 232 LBS | DIASTOLIC BLOOD PRESSURE: 86 MMHG | HEART RATE: 67 BPM | RESPIRATION RATE: 20 BRPM | HEIGHT: 67 IN | TEMPERATURE: 98.3 F | SYSTOLIC BLOOD PRESSURE: 124 MMHG | BODY MASS INDEX: 36.41 KG/M2 | OXYGEN SATURATION: 97 %

## 2022-11-28 DIAGNOSIS — J06.9 URTI (ACUTE UPPER RESPIRATORY INFECTION): ICD-10-CM

## 2022-11-28 DIAGNOSIS — J02.9 SORE THROAT: ICD-10-CM

## 2022-11-28 LAB
INT CON NEG: NORMAL
INT CON POS: NORMAL
S PYO AG THROAT QL: NEGATIVE

## 2022-11-28 PROCEDURE — 99213 OFFICE O/P EST LOW 20 MIN: CPT

## 2022-11-28 PROCEDURE — 87880 STREP A ASSAY W/OPTIC: CPT

## 2022-11-28 ASSESSMENT — ENCOUNTER SYMPTOMS
DIARRHEA: 0
CHILLS: 0
VOMITING: 0
FEVER: 0
MYALGIAS: 0
SORE THROAT: 1
COUGH: 1

## 2022-11-28 ASSESSMENT — FIBROSIS 4 INDEX: FIB4 SCORE: 2.51

## 2022-11-28 NOTE — LETTER
"[facility] [place]  [address]  [place] [address]     November 28, 2022    Patient: Devin Montgomery   YOB: 1970   Date of Visit: 11/28/2022       To Whom It May Concern:    Devin Montgomery was seen and treated in our department on 11/28/2022.     Sincerely,     Christy Young \"Lia\" RAMON Mane                 "

## 2022-11-28 NOTE — PROGRESS NOTES
Subjective     Devin Montgomery is a 52 y.o. male who presents with Cough (Congestion,runny nose,watery eyes,mucus,x4 days)            HPI      Patient presents with symptoms for 3 days now.  He reports dry cough, states that he feels like something is stuck in his throat.  He reports taking Mucinex, providing some relief.  He further reports sore throat, rhinorrhea, and nasal congestion.  He denies any fever, chills, fatigue, myalgias.  He further denies any vomiting or diarrhea.  Patient reports that he is COVID vaccinated.  He denies any known sick contacts.      Patient's current problem list, medications, and past medical/surgical history were reviewed in Epic.    PMH:  has no past medical history on file.  MEDS: No current outpatient medications on file.  ALLERGIES:   Allergies   Allergen Reactions    Seasonal      SURGHX:   Past Surgical History:   Procedure Laterality Date    ND LAP, CHENTE RESTRICT PROC, LONGITUDINAL GAS*  9/2/2020    Procedure: GASTRECTOMY, SLEEVE, LAPAROSCOPIC;  Surgeon: Bud Hayward M.D.;  Location: SURGERY [facility];  Service: General    ND LAP,BILIARY TRACT,UNLISTED  9/2/2020    Procedure: BIOPSY, LIVER, LAPAROSCOPIC;  Surgeon: Bud Hayward M.D.;  Location: SURGERY [facility];  Service: General    OTHER  1999    tonsillectomy     SOCHX:  reports that he quit smoking about 22 years ago. His smoking use included cigarettes. He has a 9.50 pack-year smoking history. He has never used smokeless tobacco. He reports that he does not currently use alcohol. He reports that he does not use drugs.  FH: Reviewed with patient, not pertinent to this visit.       Review of Systems   Constitutional:  Negative for chills, fever and malaise/fatigue.   HENT:  Positive for congestion and sore throat.         Rhinorrhea    Respiratory:  Positive for cough.    Gastrointestinal:  Negative for diarrhea and vomiting.   Musculoskeletal:  Negative for myalgias.            Objective     /86  "(BP Location: Right arm, Patient Position: Sitting, BP Cuff Size: Adult)   Pulse 67   Temp 36.8 °C (98.3 °F) (Temporal)   Resp 20   Ht 1.702 m (5' 7\")   Wt 105 kg (232 lb)   SpO2 97%   BMI 36.34 kg/m²      Physical Exam  Constitutional:       Appearance: Normal appearance.   HENT:      Head: Normocephalic.      Nose: Congestion present.      Mouth/Throat:      Pharynx: Posterior oropharyngeal erythema present.   Eyes:      Extraocular Movements: Extraocular movements intact.   Cardiovascular:      Rate and Rhythm: Normal rate and regular rhythm.      Pulses: Normal pulses.      Heart sounds: Normal heart sounds.   Pulmonary:      Effort: Pulmonary effort is normal.      Breath sounds: Normal breath sounds.   Musculoskeletal:         General: Normal range of motion.      Cervical back: Normal range of motion.   Skin:     General: Skin is warm.   Neurological:      General: No focal deficit present.      Mental Status: He is alert.   Psychiatric:         Mood and Affect: Mood normal.         Behavior: Behavior normal.     Results:    Rapid strep a-negative          Assessment & Plan     1. URTI (acute upper respiratory infection)      2. Sore throat    - POCT Rapid Strep A    Patient tested negative for strep A.  His presentation is consistent with an upper respiratory tract infection, most likely viral.  Advised on symptomatic treatment at home. Discussed treatment plan with patient, he is agreeable and verbalized understanding.  Educated patient on signs and symptoms watch out for, when to return to the clinic or go to the ER.    Recommended supportive treatment at home:  OTC Tylenol or Motrin for fever/discomfort.  OTC supportive care for nasal congestion - saline nasal spray/Flonase nasal spray and/or netipot  Humidifier and steam inhalation/warm showers.  Increase oral fluid intake.    Work note provided    Electronically Signed by RAMON Gunter                   "

## 2023-03-30 ENCOUNTER — OFFICE VISIT (OUTPATIENT)
Dept: URGENT CARE | Facility: CLINIC | Age: 53
End: 2023-03-30
Payer: COMMERCIAL

## 2023-03-30 VITALS
DIASTOLIC BLOOD PRESSURE: 82 MMHG | OXYGEN SATURATION: 96 % | HEIGHT: 67 IN | TEMPERATURE: 98.7 F | SYSTOLIC BLOOD PRESSURE: 128 MMHG | BODY MASS INDEX: 37.67 KG/M2 | RESPIRATION RATE: 16 BRPM | HEART RATE: 78 BPM | WEIGHT: 240 LBS

## 2023-03-30 DIAGNOSIS — T43.615A ADVERSE EFFECT OF CAFFEINE, INITIAL ENCOUNTER: ICD-10-CM

## 2023-03-30 PROCEDURE — 99214 OFFICE O/P EST MOD 30 MIN: CPT | Performed by: PHYSICIAN ASSISTANT

## 2023-03-30 ASSESSMENT — FIBROSIS 4 INDEX: FIB4 SCORE: 2.56

## 2023-03-31 ASSESSMENT — ENCOUNTER SYMPTOMS
COUGH: 0
PALPITATIONS: 1
MYALGIAS: 0
FEVER: 0
SHORTNESS OF BREATH: 0
DIARRHEA: 0
EYE PAIN: 0
SORE THROAT: 0
CHILLS: 0
HEADACHES: 0
ABDOMINAL PAIN: 0
VOMITING: 0
NAUSEA: 0
CONSTIPATION: 0

## 2023-03-31 NOTE — PROGRESS NOTES
"Subjective:   Devin Montgomery is a 53 y.o. male who presents for Irregular Heart Beat (Racing heart rate, taste in mouth)      Is a 53-year-old male who normally drinks 2 cups of coffee in the morning, he has been experimenting with having an energy drink in the afternoons around 2 PM.  2 days ago after having a caffeinated energy drink and drinking about a half of it he subsequently noted some acid reflux heartburn symptoms as well as feeling like his heart rate is elevated, he discontinued this yesterday however today at work he had his 2 cups of coffee in the morning and more than half of a energy drink in the afternoon.  He felt like his heart was racing and noted on his Apple Watch his heart rate was 102.  He does not feel dizzy or have chest pain but again noted some acid reflux.  Symptoms were notable around 3 hours after the ingestion have been feeding sense.  Currently he is not having any chest pain shortness of breath heartburn symptoms or feelings of palpitation    Review of Systems   Constitutional:  Negative for chills and fever.   HENT:  Negative for congestion, ear pain and sore throat.    Eyes:  Negative for pain.   Respiratory:  Negative for cough and shortness of breath.    Cardiovascular:  Positive for palpitations. Negative for chest pain.   Gastrointestinal:  Negative for abdominal pain, constipation, diarrhea, nausea and vomiting.   Genitourinary:  Negative for dysuria.   Musculoskeletal:  Negative for myalgias.   Skin:  Negative for rash.   Neurological:  Negative for headaches.     Medications, Allergies, and current problem list reviewed today in Epic.     Objective:     /82   Pulse 78   Temp 37.1 °C (98.7 °F) (Temporal)   Resp 16   Ht 1.702 m (5' 7\")   Wt 109 kg (240 lb)   SpO2 96%     Physical Exam  Vitals reviewed.   Constitutional:       Appearance: Normal appearance.   HENT:      Head: Normocephalic and atraumatic.      Right Ear: External ear normal.      " Left Ear: External ear normal.      Nose: Nose normal.      Mouth/Throat:      Mouth: Mucous membranes are moist.   Eyes:      Conjunctiva/sclera: Conjunctivae normal.   Cardiovascular:      Rate and Rhythm: Normal rate and regular rhythm.   Pulmonary:      Effort: Pulmonary effort is normal.      Breath sounds: Normal breath sounds.   Skin:     General: Skin is warm and dry.      Capillary Refill: Capillary refill takes less than 2 seconds.   Neurological:      Mental Status: He is alert and oriented to person, place, and time.       Assessment/Plan:     Diagnosis and associated orders:     1. 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  I discussed with him that EKG cannot rule out all cardiopulmonary pathology but at this point him and I both agree that he is safe to monitor symptoms at home and consider ER evaluation if he notes any new or concerning symptoms.         Differential diagnosis, natural history, supportive care, and indications for immediate follow-up discussed.    Advised the patient to follow-up with the primary care physician for recheck, reevaluation, and consideration of further management.    Please note that this dictation was created using voice recognition software. I have made a reasonable attempt to correct obvious errors, but I expect that there are errors of grammar and possibly content that I did not discover before finalizing the note.    This note was electronically signed by Devendra Stoll PA-C

## 2023-04-12 ENCOUNTER — OFFICE VISIT (OUTPATIENT)
Dept: MEDICAL GROUP | Facility: MEDICAL CENTER | Age: 53
End: 2023-04-12
Payer: COMMERCIAL

## 2023-04-12 VITALS
TEMPERATURE: 98.5 F | DIASTOLIC BLOOD PRESSURE: 72 MMHG | HEIGHT: 67 IN | HEART RATE: 78 BPM | SYSTOLIC BLOOD PRESSURE: 100 MMHG | BODY MASS INDEX: 37.58 KG/M2 | WEIGHT: 239.42 LBS | RESPIRATION RATE: 16 BRPM | OXYGEN SATURATION: 97 %

## 2023-04-12 DIAGNOSIS — R20.2 TINGLING: ICD-10-CM

## 2023-04-12 DIAGNOSIS — M25.512 CHRONIC LEFT SHOULDER PAIN: ICD-10-CM

## 2023-04-12 DIAGNOSIS — G89.29 CHRONIC BILATERAL LOW BACK PAIN WITH BILATERAL SCIATICA: ICD-10-CM

## 2023-04-12 DIAGNOSIS — G89.29 CHRONIC LEFT SHOULDER PAIN: ICD-10-CM

## 2023-04-12 DIAGNOSIS — M54.42 CHRONIC BILATERAL LOW BACK PAIN WITH BILATERAL SCIATICA: ICD-10-CM

## 2023-04-12 DIAGNOSIS — J30.9 ALLERGIC SINUSITIS: ICD-10-CM

## 2023-04-12 DIAGNOSIS — M54.41 CHRONIC BILATERAL LOW BACK PAIN WITH BILATERAL SCIATICA: ICD-10-CM

## 2023-04-12 DIAGNOSIS — E66.09 CLASS 2 OBESITY DUE TO EXCESS CALORIES WITHOUT SERIOUS COMORBIDITY WITH BODY MASS INDEX (BMI) OF 37.0 TO 37.9 IN ADULT: ICD-10-CM

## 2023-04-12 PROCEDURE — 99214 OFFICE O/P EST MOD 30 MIN: CPT | Performed by: BEHAVIOR ANALYST

## 2023-04-12 ASSESSMENT — FIBROSIS 4 INDEX: FIB4 SCORE: 2.56

## 2023-04-12 ASSESSMENT — PATIENT HEALTH QUESTIONNAIRE - PHQ9: CLINICAL INTERPRETATION OF PHQ2 SCORE: 0

## 2023-04-12 ASSESSMENT — ENCOUNTER SYMPTOMS: SHORTNESS OF BREATH: 0

## 2023-04-12 NOTE — PATIENT INSTRUCTIONS
- Recommended Twice daily nasal saline irrigation and then 1 spray of Flonase or Nasocort post saline irrigation.    - Antihistamine such as Claritin or Zyrtec  - OTC Mucinex for congestion in chest.     Healthy Diet  -Mediterranean Diet or DASH Diet- rich in fruits, vegetables, nuts and healthy oils  -good rule of thumb for portions = size of the palm of your hand  -healthy plate = tells you how to build a healthy meal    Exercise  -150 minutes of moderate aerobic activity per week OR 75 minutes of vigorous aerobic activity per week  +  -2 days per week of strength    Fat-burning exercise  -You want to be able to talk but not sing while doing the exercise  -Heart rate zone = 60-70% of max heart rate    Calculate Heart Rate  -Max HR = 220 - age  -Fat burning zone = 0.6 x max HR --- 0.7 x max HR     Hydration:   - Drink mainly water  - Avoiding sugary beverages    Sleep Hygiene:  - Allowing 7-8 hrs of overnight sleep  -Avoid naps.  Napping during the day can disturb the normal pattern of sleep and wakefulness.  -Avoid stimulants, such as caffeine and nicotine, and alcohol as bedtime approaches.  While alcohol is well known to speed the onset of sleep, the process of the body metabolizing the alcohol can cause arousal, thus disrupting sleep.  -Exercise.  All forms of exercise help to ensure sound sleep.  Vigorous activity should be conducted in the morning or late afternoon, while a relaxing exercise, like yoga, can be done before bed to help initiate a restful night sleep.  -Avoid foods too close to bedtime-particularly large meals and chocolate (which contains caffeine).  And try not to make any significant change to your diet.  For example, if you are struggling with sleep problem, is not a good time to start experimenting with spicy dishes.  -Soak up some natural light.  This is particularly important for older people who may not venture outside as frequently as children in younger adults.  Light exposure helps  maintain a healthy sleep-wake cycle.  -Establish a regular bedtime routine.  Try to avoid emotionally upsetting conversations and activities before going to sleep.  -Associated bed with sleep.  Is not a good idea to watch television, use her computer or phone, listen to the radio, or read while in bed.  -Ensure a pleasant, relaxing sleep environment.  The bed should be comfortable, in the room should not be too hot, cold, or bright.  - Avoid bedroom clock or phone. Avoid checking the time at night. This includes alarm clocks and other time pieces (eg, watches and smart phones). Checking the time increases cognitive arousal and prolongs wakefulness.      Journaling  - Good for mental health  -The best way to keep track of calories in and calories burned by exercise

## 2023-04-12 NOTE — PROGRESS NOTES
Subjective:     CC:    Chief Complaint   Patient presents with    Establish Care          HISTORY OF THE PRESENT ILLNESS: Patient is a 53 y.o. male. This pleasant patient is here today to establish care and discuss allergies and continued tingling in bilateral hands and toes.  Prior PCP was Dr. Alvarez.    Problem   Chronic Left Shoulder Pain    Fell off a ladder 5-6 years ago and landed on shoulder. Xray at the time were okay.  Continues to have pain at the superior aspect of shoulder.  No formal physical therapy.   He works as a  but is right-handed.     Chronic Bilateral Low Back Pain With Bilateral Sciatica    Reports bilateral low back pain.  He gets tingling in his toes bilaterally.  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  History of occupational repetitive motion and movement.     Tingling    Chronic issue. EEG completed and was normal. He recently started going to the chiropractor.   When lying flat his bilateral hands and toes tingle. Admits to low back pain.   States that he does have some decreased sensation in his fourth and fifth finger bilaterally.  Denies any loss of sensation in his feet or toes.     Allergic Sinusitis    Year round allergies. Congestion and occasional headaches.   Recently this has led to phlegm in the chest for the past 3 weeks and he feels that its stuck. He denies a cough.   He had sinus surgery about 3 years ago and feel symptoms have returned.   Currently taking tylenol for allergies and occasionally Flonase.          Class 2 Obesity Due to Excess Calories Without Serious Comorbidity With Body Mass Index (Bmi) of 37.0 to 37.9 in Adult    States that he was less careful about his diet during the holidays however since the beginning of the year he reports drinking protien shakes and eating salads.          Current Outpatient Medications   Medication Sig    cyanocobalamin (VITAMIN B12) 1000 MCG Tab Take 1 Tablet by mouth every day.     "    Social History     Socioeconomic History    Marital status:    Tobacco Use    Smoking status: Former     Packs/day: 0.50     Years: 19.00     Pack years: 9.50     Types: Cigarettes     Quit date:      Years since quittin.2    Smokeless tobacco: Never   Vaping Use    Vaping Use: Never used   Substance and Sexual Activity    Alcohol use: Not Currently    Drug use: No    Sexual activity: Yes     Partners: Female   Social History Narrative    ** Merged History Encounter **            Family History   Problem Relation Age of Onset    No Known Problems Mother     No Known Problems Father     No Known Problems Sister     No Known Problems Brother     No Known Problems Sister        ROS: See HPI  Review of Systems   Constitutional:  Negative for malaise/fatigue.   Respiratory:  Negative for shortness of breath.    Cardiovascular:  Negative for chest pain.       Objective:     Exam: /72 (BP Location: Left arm, Patient Position: Sitting, BP Cuff Size: Adult long)   Pulse 78   Temp 36.9 °C (98.5 °F) (Temporal)   Resp 16   Ht 1.702 m (5' 7\")   Wt 109 kg (239 lb 6.7 oz)   SpO2 97%   BMI 37.50 kg/m²   Body mass index is 37.5 kg/m².    Physical Exam  Constitutional:       Appearance: Normal appearance.   HENT:      Nose: Congestion present.      Mouth/Throat:      Pharynx: No oropharyngeal exudate or posterior oropharyngeal erythema.   Eyes:      Extraocular Movements: Extraocular movements intact.   Cardiovascular:      Rate and Rhythm: Normal rate and regular rhythm.   Pulmonary:      Effort: Pulmonary effort is normal. No respiratory distress.      Breath sounds: Normal breath sounds.   Musculoskeletal:      Comments: Left Shoulder: Full ROM, full strength.     Neurological:      Mental Status: He is alert.              Assessment & Plan:     53 y.o. male with the following -     1. Allergic sinusitis  -Chronic problem with current exacerbation.  - Recommended Twice daily nasal saline irrigation " and then 1 spray of Flonase or Nasocort post saline irrigation.  Oral antihistamine such as Claritin or Zyrtec as needed.    2. Tingling  -Chronic problem.  Work-up has been unremarkable.  He does take a daily B vitamin complex.  His vitamin B-12 level was elevated 12/2021.  -After visit, sent message recommending that he STPO the vitamin supplementation to 2 days/week.  We will also address some musculoskeletal issues with referral to physical therapy.    3. Chronic left shoulder pain  4. Chronic bilateral low back pain with bilateral sciatica  - Chronic problems.  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  - Referral to Physical Therapy    5. Class 2 obesity due to excess calories without serious comorbidity with body mass index (BMI) of 37.0 to 37.9 in adult  -Chronic problem.   -Continue to work on diet and exercise modifications.    Other orders  - cyanocobalamin (VITAMIN B12) 1000 MCG Tab; Take 1 Tablet by mouth every day.    EDUCATION AND COUNSELING:  -Exercise: At least 150 minutes of moderate aerobic activity per week OR 75 minutes of vigorous aerobic activity per week, plus 2 days per week of strength training.    -Healthy lifestyle and eating habits: Mediterranean based diet (rich in fruits, vegetables, nuts and healthy oils); proper hydration and avoiding sugary beverages; adequate sleep hygiene (allowing 7-8 hrs of overnight sleep).        Return in about 4 months (around 8/12/2023) for Symptoms check.    Please note that this dictation was created using voice recognition software. I have made every reasonable attempt to correct obvious errors, but I expect that there are errors of grammar and possibly content that I did not discover before finalizing the note.

## 2023-09-08 ENCOUNTER — OFFICE VISIT (OUTPATIENT)
Dept: MEDICAL GROUP | Facility: MEDICAL CENTER | Age: 53
End: 2023-09-08
Payer: COMMERCIAL

## 2023-09-08 VITALS
OXYGEN SATURATION: 99 % | DIASTOLIC BLOOD PRESSURE: 80 MMHG | HEIGHT: 67 IN | TEMPERATURE: 97.9 F | SYSTOLIC BLOOD PRESSURE: 116 MMHG | HEART RATE: 73 BPM | BODY MASS INDEX: 37.83 KG/M2 | WEIGHT: 241 LBS

## 2023-09-08 DIAGNOSIS — R06.2 BILATERAL WHEEZING: ICD-10-CM

## 2023-09-08 DIAGNOSIS — E78.00 PURE HYPERCHOLESTEROLEMIA: ICD-10-CM

## 2023-09-08 DIAGNOSIS — Z23 NEED FOR VACCINATION: ICD-10-CM

## 2023-09-08 DIAGNOSIS — J30.9 ALLERGIC SINUSITIS: ICD-10-CM

## 2023-09-08 DIAGNOSIS — Z11.59 NEED FOR HEPATITIS C SCREENING TEST: ICD-10-CM

## 2023-09-08 DIAGNOSIS — E66.09 CLASS 2 OBESITY DUE TO EXCESS CALORIES WITHOUT SERIOUS COMORBIDITY WITH BODY MASS INDEX (BMI) OF 37.0 TO 37.9 IN ADULT: ICD-10-CM

## 2023-09-08 DIAGNOSIS — Z00.00 WELLNESS EXAMINATION: ICD-10-CM

## 2023-09-08 DIAGNOSIS — Z12.5 PROSTATE CANCER SCREENING: ICD-10-CM

## 2023-09-08 DIAGNOSIS — Z12.11 COLON CANCER SCREENING: ICD-10-CM

## 2023-09-08 PROCEDURE — 90746 HEPB VACCINE 3 DOSE ADULT IM: CPT | Performed by: BEHAVIOR ANALYST

## 2023-09-08 PROCEDURE — 90715 TDAP VACCINE 7 YRS/> IM: CPT | Performed by: BEHAVIOR ANALYST

## 2023-09-08 PROCEDURE — 99214 OFFICE O/P EST MOD 30 MIN: CPT | Mod: 25 | Performed by: BEHAVIOR ANALYST

## 2023-09-08 PROCEDURE — 90471 IMMUNIZATION ADMIN: CPT | Performed by: BEHAVIOR ANALYST

## 2023-09-08 PROCEDURE — 90472 IMMUNIZATION ADMIN EACH ADD: CPT | Performed by: BEHAVIOR ANALYST

## 2023-09-08 PROCEDURE — 3079F DIAST BP 80-89 MM HG: CPT | Performed by: BEHAVIOR ANALYST

## 2023-09-08 PROCEDURE — 3074F SYST BP LT 130 MM HG: CPT | Performed by: BEHAVIOR ANALYST

## 2023-09-08 RX ORDER — ALBUTEROL SULFATE 90 UG/1
1-2 AEROSOL, METERED RESPIRATORY (INHALATION) EVERY 4 HOURS PRN
Qty: 1 EACH | Refills: 0 | Status: SHIPPED | OUTPATIENT
Start: 2023-09-08

## 2023-09-08 ASSESSMENT — ENCOUNTER SYMPTOMS
SHORTNESS OF BREATH: 0
PALPITATIONS: 0
COUGH: 1
WHEEZING: 1

## 2023-09-08 ASSESSMENT — FIBROSIS 4 INDEX: FIB4 SCORE: 2.56

## 2023-09-08 NOTE — PROGRESS NOTES
"Subjective:     CC:    Chief Complaint   Patient presents with    Follow-Up          HISTORY OF THE PRESENT ILLNESS:   Devin presents today with    Problem   Bilateral Wheezing   Pure Hypercholesterolemia    Elevated triglycerides.      Allergic Sinusitis    Year round allergies. Congestion and occasional headaches and sinus pressure.   Continues to have phlegm in the chest and he feels that its stuck. He denies a cough but admits to wheezing when he has the phelgm but goes away with clearing throat. Occasionally worse at night.   He had sinus surgery about 3 years ago and feel symptoms have returned.   Currently taking tylenol for allergies and occasionally Flonase.   He was advised to try nasal saline irrigation but admits he has not been consistent with this and admits he has been lazy.        Class 2 Obesity Due to Excess Calories Without Serious Comorbidity With Body Mass Index (Bmi) of 37.0 to 37.9 in Adult    He admits to putting about a teaspoon of sugar in his coffee and drinking two cups a day.  He denies drinking sodas very often. Occasional sugar free energy drink.   Work is physical an active.          Current Outpatient Medications   Medication Sig    albuterol 108 (90 Base) MCG/ACT Aero Soln inhalation aerosol Inhale 1-2 Puffs every four hours as needed for Shortness of Breath.    cyanocobalamin (VITAMIN B12) 1000 MCG Tab Take 1 Tablet by mouth every day.          ROS: See HPI  Review of Systems   Constitutional:  Negative for malaise/fatigue.   Respiratory:  Positive for cough and wheezing. Negative for shortness of breath.    Cardiovascular:  Negative for chest pain and palpitations.         Objective:     Exam: /80 (BP Location: Left arm, Patient Position: Sitting, BP Cuff Size: Adult long)   Pulse 73   Temp 36.6 °C (97.9 °F) (Temporal)   Ht 1.702 m (5' 7\")   Wt 109 kg (241 lb)   SpO2 99%   BMI 37.75 kg/m²   Body mass index is 37.75 kg/m².    Physical Exam  Constitutional:       " Appearance: Normal appearance.   Eyes:      Extraocular Movements: Extraocular movements intact.   Cardiovascular:      Rate and Rhythm: Normal rate and regular rhythm.   Pulmonary:      Effort: Pulmonary effort is normal. No respiratory distress.      Breath sounds: Wheezing present.   Neurological:      Mental Status: He is alert.                Assessment & Plan:     53 y.o. male with the following -     1. Allergic sinusitis  -Chronic, persistent problem.  - - Recommended Twice daily nasal saline irrigation and then 1 spray of Flonase or Nasocort post saline irrigation.     2. Class 2 obesity due to excess calories without serious comorbidity with body mass index (BMI) of 37.0 to 37.9 in adult  -Chronic, persistent problem.  -Counseled on diet and exercise modifications    3. Pure hypercholesterolemia  - Chronic problem, without clear indication for  statin.  The 10-year ASCVD risk score (Piedad ACOSTA, et al., 2019) is: 3.9%  -Completed diet and exercise modification counseling.     4. Need for hepatitis C screening test  - HEP C VIRUS ANTIBODY; Future    5. Need for vaccination  Pt desires vaccination.  Risks and benefits discussed.  No contraindications today.  Vaccine given.  Follow-up precautions discussed.  - Hepatitis B Vaccine Adult 20+  - Tdap Vaccine =>6YO IM    6. Wellness examination  - Lipid Profile; Future  - HEMOGLOBIN A1C; Future  - Comp Metabolic Panel; Future  - CBC WITH DIFFERENTIAL; Future  - VITAMIN D,25 HYDROXY (DEFICIENCY); Future  - TSH WITH REFLEX TO FT4; Future    7. Prostate cancer screening  - PROSTATE SPECIFIC AG SCREENING; Future    8. Colon cancer screening  - COLOGUARD (FIT DNA)    9. Bilateral wheezing  -New problem found on exam today.  Patient continues to have trouble with mucus production and cough and associated occasional wheezing.  -We will proceed with a pulmonary function test, short acting bronchodilator as needed, and recommended Mucinex.   - PULMONARY FUNCTION TESTS -Test  requested: Complete Pulmonary Function Test; Future  - albuterol 108 (90 Base) MCG/ACT Aero Soln inhalation aerosol; Inhale 1-2 Puffs every four hours as needed for Shortness of Breath.  Dispense: 1 Each; Refill: 0          Return in about 3 months (around 12/8/2023).    Please note that this dictation was created using voice recognition software. I have made every reasonable attempt to correct obvious errors, but I expect that there are errors of grammar and possibly content that I did not discover before finalizing the note.

## 2023-09-08 NOTE — LETTER
September 8, 2023    To Whom It May Concern:         This is confirmation that Devin Montgomery attended his scheduled appointment with JEF Reese on 9/08/23.             If you have any questions please do not hesitate to call me at the phone number listed below.    Sincerely,          VI Reese.  [phone]

## 2023-09-26 ENCOUNTER — HOSPITAL ENCOUNTER (OUTPATIENT)
Dept: PULMONOLOGY | Facility: MEDICAL CENTER | Age: 53
End: 2023-09-26
Attending: BEHAVIOR ANALYST
Payer: COMMERCIAL

## 2023-09-26 DIAGNOSIS — R06.2 BILATERAL WHEEZING: ICD-10-CM

## 2023-09-26 PROCEDURE — 94060 EVALUATION OF WHEEZING: CPT

## 2023-09-26 PROCEDURE — 94060 EVALUATION OF WHEEZING: CPT | Mod: 26 | Performed by: INTERNAL MEDICINE

## 2023-09-26 PROCEDURE — 94729 DIFFUSING CAPACITY: CPT

## 2023-09-26 PROCEDURE — 94726 PLETHYSMOGRAPHY LUNG VOLUMES: CPT

## 2023-09-26 PROCEDURE — 94726 PLETHYSMOGRAPHY LUNG VOLUMES: CPT | Mod: 26 | Performed by: INTERNAL MEDICINE

## 2023-09-26 PROCEDURE — 94729 DIFFUSING CAPACITY: CPT | Mod: 26 | Performed by: INTERNAL MEDICINE

## 2023-09-26 RX ADMIN — Medication 2.5 MG: at 11:45

## 2023-09-26 ASSESSMENT — PULMONARY FUNCTION TESTS
FEV1/FVC_PERCENT_PREDICTED: 108
FVC_PERCENT_PREDICTED: 99
FEV1_LLN: 2.88
FEV1/FVC_PREDICTED: 79
FEV1/FVC_PERCENT_PREDICTED: 112
FEV1/FVC: 87.64
FVC_LLN: 3.65
FEV1/FVC_PERCENT_CHANGE: 2
FEV1: 3.83
FEV1/FVC: 85
FEV1/FVC_PERCENT_PREDICTED: 109
FEV1_LLN: 2.88
FVC: 4.37
FEV1/FVC_PERCENT_PREDICTED: 110
FEV1/FVC_PERCENT_LLN: 66
FVC: 4.37
FEV1/FVC_PERCENT_LLN: 66
FEV1/FVC: 85
FEV1: 3.73
FEV1_PERCENT_CHANGE: 0
FEV1_PERCENT_PREDICTED: 111
FEV1_PERCENT_CHANGE: 2
FEV1/FVC_PERCENT_PREDICTED: 79
FVC_LLN: 3.65
FVC_PERCENT_PREDICTED: 99
FVC_PREDICTED: 4.37
FEV1_PREDICTED: 3.45
FEV1/FVC: 88
FEV1_PERCENT_PREDICTED: 108

## 2023-09-27 NOTE — PROCEDURES
DATE OF SERVICE:  09/26/2023     PULMONARY FUNCTION TEST INTERPRETATION     REQUESTING PROVIDER:  RAMNO Lanza     REASON FOR REQUEST:  Bilateral wheezing.     INTERPRETATION:  1.  Acceptable and reproducible.  2.  FEV1 3.73 liters (108%), FVC 4.37 liters (99%), ratio 85%.  3.  Flow volume loops, normal appearing.  4.  TLC 6.98 liters (109%).  5.  DLCO 35.94 mL per minute mmHg (137%).     IMPRESSION:  Normal spirometry with no response to bronchodilator.  Normal   total lung capacity.  Air trapping and elevated DLCO, suggestive of reactive   airway disease.  Clinically correlate.        ______________________________  Lani Diaz MD    /[facility] [place]    DD:  09/26/2023 17:50  DT:  09/26/2023 18:12    Job#:  [id number]    CC:RAMON Sánchez

## 2023-12-19 ENCOUNTER — APPOINTMENT (OUTPATIENT)
Dept: MEDICAL GROUP | Facility: PHYSICIAN GROUP | Age: 53
End: 2023-12-19
Payer: COMMERCIAL

## 2024-01-15 ENCOUNTER — HOSPITAL ENCOUNTER (OUTPATIENT)
Dept: LAB | Facility: MEDICAL CENTER | Age: 54
End: 2024-01-15
Attending: BEHAVIOR ANALYST
Payer: COMMERCIAL

## 2024-01-15 DIAGNOSIS — Z00.00 WELLNESS EXAMINATION: ICD-10-CM

## 2024-01-15 DIAGNOSIS — Z11.59 NEED FOR HEPATITIS C SCREENING TEST: ICD-10-CM

## 2024-01-15 DIAGNOSIS — Z12.5 PROSTATE CANCER SCREENING: ICD-10-CM

## 2024-01-15 LAB
25(OH)D3 SERPL-MCNC: 23 NG/ML (ref 30–100)
ALBUMIN SERPL BCP-MCNC: 4.2 G/DL (ref 3.2–4.9)
ALBUMIN/GLOB SERPL: 1.7 G/DL
ALP SERPL-CCNC: 74 U/L (ref 30–99)
ALT SERPL-CCNC: 29 U/L (ref 2–50)
ANION GAP SERPL CALC-SCNC: 8 MMOL/L (ref 7–16)
AST SERPL-CCNC: 22 U/L (ref 12–45)
BASOPHILS # BLD AUTO: 0.7 % (ref 0–1.8)
BASOPHILS # BLD: 0.05 K/UL (ref 0–0.12)
BILIRUB SERPL-MCNC: 0.6 MG/DL (ref 0.1–1.5)
BUN SERPL-MCNC: 12 MG/DL (ref 8–22)
CALCIUM ALBUM COR SERPL-MCNC: 9.1 MG/DL (ref 8.5–10.5)
CALCIUM SERPL-MCNC: 9.3 MG/DL (ref 8.5–10.5)
CHLORIDE SERPL-SCNC: 105 MMOL/L (ref 96–112)
CHOLEST SERPL-MCNC: 141 MG/DL (ref 100–199)
CO2 SERPL-SCNC: 26 MMOL/L (ref 20–33)
CREAT SERPL-MCNC: 0.75 MG/DL (ref 0.5–1.4)
EOSINOPHIL # BLD AUTO: 0.25 K/UL (ref 0–0.51)
EOSINOPHIL NFR BLD: 3.7 % (ref 0–6.9)
ERYTHROCYTE [DISTWIDTH] IN BLOOD BY AUTOMATED COUNT: 44.3 FL (ref 35.9–50)
EST. AVERAGE GLUCOSE BLD GHB EST-MCNC: 108 MG/DL
GFR SERPLBLD CREATININE-BSD FMLA CKD-EPI: 107 ML/MIN/1.73 M 2
GLOBULIN SER CALC-MCNC: 2.5 G/DL (ref 1.9–3.5)
GLUCOSE SERPL-MCNC: 81 MG/DL (ref 65–99)
HBA1C MFR BLD: 5.4 % (ref 4–5.6)
HCT VFR BLD AUTO: 43.6 % (ref 42–52)
HCV AB SER QL: NORMAL
HDLC SERPL-MCNC: 43 MG/DL
HGB BLD-MCNC: 15 G/DL (ref 14–18)
IMM GRANULOCYTES # BLD AUTO: 0.02 K/UL (ref 0–0.11)
IMM GRANULOCYTES NFR BLD AUTO: 0.3 % (ref 0–0.9)
LDLC SERPL CALC-MCNC: 74 MG/DL
LYMPHOCYTES # BLD AUTO: 3.11 K/UL (ref 1–4.8)
LYMPHOCYTES NFR BLD: 45.8 % (ref 22–41)
MCH RBC QN AUTO: 32.3 PG (ref 27–33)
MCHC RBC AUTO-ENTMCNC: 34.4 G/DL (ref 32.3–36.5)
MCV RBC AUTO: 94 FL (ref 81.4–97.8)
MONOCYTES # BLD AUTO: 0.57 K/UL (ref 0–0.85)
MONOCYTES NFR BLD AUTO: 8.4 % (ref 0–13.4)
NEUTROPHILS # BLD AUTO: 2.79 K/UL (ref 1.82–7.42)
NEUTROPHILS NFR BLD: 41.1 % (ref 44–72)
NRBC # BLD AUTO: 0 K/UL
NRBC BLD-RTO: 0 /100 WBC (ref 0–0.2)
PLATELET # BLD AUTO: 338 K/UL (ref 164–446)
PMV BLD AUTO: 10.1 FL (ref 9–12.9)
POTASSIUM SERPL-SCNC: 4.1 MMOL/L (ref 3.6–5.5)
PROT SERPL-MCNC: 6.7 G/DL (ref 6–8.2)
PSA SERPL-MCNC: 0.86 NG/ML (ref 0–4)
RBC # BLD AUTO: 4.64 M/UL (ref 4.7–6.1)
SODIUM SERPL-SCNC: 139 MMOL/L (ref 135–145)
TRIGL SERPL-MCNC: 121 MG/DL (ref 0–149)
TSH SERPL DL<=0.005 MIU/L-ACNC: 1.77 UIU/ML (ref 0.38–5.33)
WBC # BLD AUTO: 6.8 K/UL (ref 4.8–10.8)

## 2024-01-15 PROCEDURE — 83036 HEMOGLOBIN GLYCOSYLATED A1C: CPT

## 2024-01-15 PROCEDURE — 84443 ASSAY THYROID STIM HORMONE: CPT

## 2024-01-15 PROCEDURE — 86803 HEPATITIS C AB TEST: CPT

## 2024-01-15 PROCEDURE — 82306 VITAMIN D 25 HYDROXY: CPT

## 2024-01-15 PROCEDURE — 84153 ASSAY OF PSA TOTAL: CPT

## 2024-01-15 PROCEDURE — 80061 LIPID PANEL: CPT

## 2024-01-15 PROCEDURE — 85025 COMPLETE CBC W/AUTO DIFF WBC: CPT

## 2024-01-15 PROCEDURE — 80053 COMPREHEN METABOLIC PANEL: CPT

## 2024-01-15 PROCEDURE — 36415 COLL VENOUS BLD VENIPUNCTURE: CPT

## 2024-01-23 ENCOUNTER — OFFICE VISIT (OUTPATIENT)
Dept: MEDICAL GROUP | Facility: PHYSICIAN GROUP | Age: 54
End: 2024-01-23
Payer: COMMERCIAL

## 2024-01-23 VITALS
BODY MASS INDEX: 38.11 KG/M2 | HEIGHT: 67 IN | OXYGEN SATURATION: 97 % | WEIGHT: 242.8 LBS | HEART RATE: 67 BPM | DIASTOLIC BLOOD PRESSURE: 68 MMHG | TEMPERATURE: 98.1 F | SYSTOLIC BLOOD PRESSURE: 112 MMHG

## 2024-01-23 DIAGNOSIS — E78.00 PURE HYPERCHOLESTEROLEMIA: ICD-10-CM

## 2024-01-23 DIAGNOSIS — R73.03 PREDIABETES: ICD-10-CM

## 2024-01-23 DIAGNOSIS — J30.89 NON-SEASONAL ALLERGIC RHINITIS, UNSPECIFIED TRIGGER: ICD-10-CM

## 2024-01-23 DIAGNOSIS — E55.9 VITAMIN D INSUFFICIENCY: ICD-10-CM

## 2024-01-23 PROCEDURE — 3078F DIAST BP <80 MM HG: CPT | Performed by: STUDENT IN AN ORGANIZED HEALTH CARE EDUCATION/TRAINING PROGRAM

## 2024-01-23 PROCEDURE — 99213 OFFICE O/P EST LOW 20 MIN: CPT | Performed by: STUDENT IN AN ORGANIZED HEALTH CARE EDUCATION/TRAINING PROGRAM

## 2024-01-23 PROCEDURE — 3074F SYST BP LT 130 MM HG: CPT | Performed by: STUDENT IN AN ORGANIZED HEALTH CARE EDUCATION/TRAINING PROGRAM

## 2024-01-23 ASSESSMENT — PATIENT HEALTH QUESTIONNAIRE - PHQ9: CLINICAL INTERPRETATION OF PHQ2 SCORE: 0

## 2024-01-23 ASSESSMENT — FIBROSIS 4 INDEX: FIB4 SCORE: 0.65

## 2024-01-24 ASSESSMENT — ENCOUNTER SYMPTOMS
FEVER: 0
DIZZINESS: 0
HEADACHES: 0
SHORTNESS OF BREATH: 0
CHILLS: 0

## 2024-01-25 NOTE — PROGRESS NOTES
"Subjective:     CC:   Chief Complaint   Patient presents with    Follow-Up       HPI:   Devin presents today with    Problem   Pure Hypercholesterolemia    Hx hypertriglyceridemia. Resolved on most recent labs -    Lab Results   Component Value Date/Time    CHOLSTRLTOT 141 01/15/2024 0621    TRIGLYCERIDE 121 01/15/2024 0621    HDL 43 01/15/2024 0621    LDL 74 01/15/2024 0621      Allergic Rhinitis    Patient reports year-round allergies. Congestion, post-nasal drip, and occasional headaches. No cough, but he does occasionally experience wheezing. Notes symptoms worse at night. He had sinus surgery about 3 years ago. For his symptoms, he occasionally uses Flonase.    PFTs September 2023:  INTERPRETATION:  1.  Acceptable and reproducible.  2.  FEV1 3.73 liters (108%), FVC 4.37 liters (99%), ratio 85%.  3.  Flow volume loops, normal appearing.  4.  TLC 6.98 liters (109%).  5.  DLCO 35.94 mL per minute mmHg (137%).  IMPRESSION:  Normal spirometry with no response to bronchodilator.  Normal   total lung capacity.  Air trapping and elevated DLCO, suggestive of reactive   airway disease.  Clinically correlate.       Prediabetes    Lab Results   Component Value Date/Time    HBA1C 5.4 01/15/2024 06:21 AM         Vitamin D Insufficiency         Past medical, family, and social history reviewed by me in Epic chart today.   Medications and allergies reviewed in Epic chart by me today.       Health Maintenance: Completed    ROS:  Review of Systems   Constitutional:  Negative for chills and fever.   Respiratory:  Negative for shortness of breath.    Cardiovascular:  Negative for chest pain.   Neurological:  Negative for dizziness and headaches.       Objective:     Exam:  /68 (BP Location: Left arm, Patient Position: Sitting, BP Cuff Size: Adult)   Pulse 67   Temp 36.7 °C (98.1 °F) (Temporal)   Ht 1.702 m (5' 7\")   Wt 110 kg (242 lb 12.8 oz)   SpO2 97%   BMI 38.03 kg/m²  Body mass index is 38.03 kg/m².    Physical " Exam  Constitutional:       General: He is not in acute distress.  HENT:      Mouth/Throat:      Mouth: Mucous membranes are moist.   Eyes:      Extraocular Movements: Extraocular movements intact.   Cardiovascular:      Rate and Rhythm: Normal rate and regular rhythm.      Heart sounds: No murmur heard.     No friction rub. No gallop.   Pulmonary:      Effort: Pulmonary effort is normal.      Breath sounds: No wheezing, rhonchi or rales.   Musculoskeletal:      Cervical back: Neck supple.   Skin:     General: Skin is warm and dry.   Neurological:      Mental Status: He is alert.   Psychiatric:         Mood and Affect: Mood normal.           Labs:     Results for orders placed or performed during the hospital encounter of 01/15/24   HEP C VIRUS ANTIBODY   Result Value Ref Range    Hepatitis C Antibody Non-Reactive Non-Reactive   Lipid Profile   Result Value Ref Range    Cholesterol,Tot 141 100 - 199 mg/dL    Triglycerides 121 0 - 149 mg/dL    HDL 43 >=40 mg/dL    LDL 74 <100 mg/dL   HEMOGLOBIN A1C   Result Value Ref Range    Glycohemoglobin 5.4 4.0 - 5.6 %    Est Avg Glucose 108 mg/dL   Comp Metabolic Panel   Result Value Ref Range    Sodium 139 135 - 145 mmol/L    Potassium 4.1 3.6 - 5.5 mmol/L    Chloride 105 96 - 112 mmol/L    Co2 26 20 - 33 mmol/L    Anion Gap 8.0 7.0 - 16.0    Glucose 81 65 - 99 mg/dL    Bun 12 8 - 22 mg/dL    Creatinine 0.75 0.50 - 1.40 mg/dL    Calcium 9.3 8.5 - 10.5 mg/dL    Correct Calcium 9.1 8.5 - 10.5 mg/dL    AST(SGOT) 22 12 - 45 U/L    ALT(SGPT) 29 2 - 50 U/L    Alkaline Phosphatase 74 30 - 99 U/L    Total Bilirubin 0.6 0.1 - 1.5 mg/dL    Albumin 4.2 3.2 - 4.9 g/dL    Total Protein 6.7 6.0 - 8.2 g/dL    Globulin 2.5 1.9 - 3.5 g/dL    A-G Ratio 1.7 g/dL   CBC WITH DIFFERENTIAL   Result Value Ref Range    WBC 6.8 4.8 - 10.8 K/uL    RBC 4.64 (L) 4.70 - 6.10 M/uL    Hemoglobin 15.0 14.0 - 18.0 g/dL    Hematocrit 43.6 42.0 - 52.0 %    MCV 94.0 81.4 - 97.8 fL    MCH 32.3 27.0 - 33.0 pg     MCHC 34.4 32.3 - 36.5 g/dL    RDW 44.3 35.9 - 50.0 fL    Platelet Count 338 164 - 446 K/uL    MPV 10.1 9.0 - 12.9 fL    Neutrophils-Polys 41.10 (L) 44.00 - 72.00 %    Lymphocytes 45.80 (H) 22.00 - 41.00 %    Monocytes 8.40 0.00 - 13.40 %    Eosinophils 3.70 0.00 - 6.90 %    Basophils 0.70 0.00 - 1.80 %    Immature Granulocytes 0.30 0.00 - 0.90 %    Nucleated RBC 0.00 0.00 - 0.20 /100 WBC    Neutrophils (Absolute) 2.79 1.82 - 7.42 K/uL    Lymphs (Absolute) 3.11 1.00 - 4.80 K/uL    Monos (Absolute) 0.57 0.00 - 0.85 K/uL    Eos (Absolute) 0.25 0.00 - 0.51 K/uL    Baso (Absolute) 0.05 0.00 - 0.12 K/uL    Immature Granulocytes (abs) 0.02 0.00 - 0.11 K/uL    NRBC (Absolute) 0.00 K/uL   VITAMIN D,25 HYDROXY (DEFICIENCY)   Result Value Ref Range    25-Hydroxy   Vitamin D 25 23 (L) 30 - 100 ng/mL   TSH WITH REFLEX TO FT4   Result Value Ref Range    TSH 1.770 0.380 - 5.330 uIU/mL   PROSTATE SPECIFIC AG SCREENING   Result Value Ref Range    Prostatic Specific Antigen Tot 0.86 0.00 - 4.00 ng/mL   ESTIMATED GFR   Result Value Ref Range    GFR (CKD-EPI) 107 >60 mL/min/1.73 m 2         Assessment & Plan:     53 y.o. male with the following -     1. Non-seasonal allergic rhinitis, unspecified trigger  Chronic in nature.  Patient states symptoms are uncontrolled.  He has not been back consistent with treatment for this.  I have recommended that he start using a nasal lavage system daily as well as Flonase nasal spray daily.  He can also take a daily antihistamine.  Consider referral to ENT if symptoms persist without improvement after 4 to 6 weeks.    2. Prediabetes  Resolved.  Continue heart healthy diet and regular exercise.    3. Pure hypercholesterolemia  Resolved.  Continue heart healthy diet and regular exercise.    4. Vitamin D insufficiency  Vitamin D level is low on patient's recent labs.  Encouraged him to start taking a vitamin D supplement, 1000 IU daily.        Return in about 6 months (around 7/23/2024) for follow  up.    Please note that this dictation was created using voice recognition software. I have made every reasonable attempt to correct obvious errors, but I expect that there are errors of grammar and possibly content that I did not discover before finalizing the note.

## 2024-02-26 ENCOUNTER — OFFICE VISIT (OUTPATIENT)
Dept: MEDICAL GROUP | Facility: MEDICAL CENTER | Age: 54
End: 2024-02-26
Payer: COMMERCIAL

## 2024-02-26 VITALS
DIASTOLIC BLOOD PRESSURE: 78 MMHG | SYSTOLIC BLOOD PRESSURE: 124 MMHG | HEART RATE: 87 BPM | BODY MASS INDEX: 37.89 KG/M2 | OXYGEN SATURATION: 95 % | WEIGHT: 241.4 LBS | HEIGHT: 67 IN | TEMPERATURE: 98.6 F

## 2024-02-26 DIAGNOSIS — N40.1 BENIGN PROSTATIC HYPERPLASIA WITH URINARY FREQUENCY: ICD-10-CM

## 2024-02-26 DIAGNOSIS — E55.9 VITAMIN D INSUFFICIENCY: ICD-10-CM

## 2024-02-26 DIAGNOSIS — R35.0 BENIGN PROSTATIC HYPERPLASIA WITH URINARY FREQUENCY: ICD-10-CM

## 2024-02-26 DIAGNOSIS — H65.193 ACUTE MEE (MIDDLE EAR EFFUSION), BILATERAL: ICD-10-CM

## 2024-02-26 DIAGNOSIS — J30.9 ALLERGIC RHINITIS, UNSPECIFIED SEASONALITY, UNSPECIFIED TRIGGER: ICD-10-CM

## 2024-02-26 LAB
APPEARANCE UR: CLEAR
BILIRUB UR STRIP-MCNC: NEGATIVE MG/DL
COLOR UR AUTO: YELLOW
GLUCOSE UR STRIP.AUTO-MCNC: NEGATIVE MG/DL
KETONES UR STRIP.AUTO-MCNC: NEGATIVE MG/DL
LEUKOCYTE ESTERASE UR QL STRIP.AUTO: NEGATIVE
NITRITE UR QL STRIP.AUTO: NEGATIVE
PH UR STRIP.AUTO: 7 [PH] (ref 5–8)
PROT UR QL STRIP: NEGATIVE MG/DL
RBC UR QL AUTO: NEGATIVE
SP GR UR STRIP.AUTO: 1.02
UROBILINOGEN UR STRIP-MCNC: NORMAL MG/DL

## 2024-02-26 PROCEDURE — 3074F SYST BP LT 130 MM HG: CPT | Performed by: FAMILY MEDICINE

## 2024-02-26 PROCEDURE — 3078F DIAST BP <80 MM HG: CPT | Performed by: FAMILY MEDICINE

## 2024-02-26 PROCEDURE — 99214 OFFICE O/P EST MOD 30 MIN: CPT | Performed by: FAMILY MEDICINE

## 2024-02-26 PROCEDURE — 81002 URINALYSIS NONAUTO W/O SCOPE: CPT | Performed by: FAMILY MEDICINE

## 2024-02-26 RX ORDER — METHYLPREDNISOLONE 4 MG/1
TABLET ORAL
Qty: 21 TABLET | Refills: 0 | Status: SHIPPED | OUTPATIENT
Start: 2024-02-26

## 2024-02-26 RX ORDER — TAMSULOSIN HYDROCHLORIDE 0.4 MG/1
0.4 CAPSULE ORAL
Qty: 90 CAPSULE | Refills: 1 | Status: SHIPPED | OUTPATIENT
Start: 2024-02-26

## 2024-02-26 ASSESSMENT — FIBROSIS 4 INDEX: FIB4 SCORE: 0.65

## 2024-02-27 ENCOUNTER — PATIENT MESSAGE (OUTPATIENT)
Dept: MEDICAL GROUP | Facility: MEDICAL CENTER | Age: 54
End: 2024-02-27
Payer: COMMERCIAL

## 2024-02-27 NOTE — PROGRESS NOTES
Subjective:   CC: Urinary frequency    HPI:     Devin Montgomery is a 53 y.o. male, established patient of the clinic.     Patient complains of recent development of urinary frequency, and it appears that he is not able to fully empty his bladder after urination.    Patient reports that he urinates approximately once every 30 minutes.  Symptoms started last week.  Denies any dysuria except for minor discomfort at the end of urination.  Negative fever, chills, flank pain, hematuria.  Negative familial history of prostate cancer.  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  His most recent labs show A1c of 5.4.  Patient also complains of chronic allergic rhinitis.  However, patient does not take any medications for symptoms.  He recently developed sinus congestion without pain.  He also complains of excessive sneezing, rhinorrhea, postnasal drips.  Chronic allergy, does not take medications.   Patient has history of sinus surgery in distant past.    Current medicines (including changes today)  Current Outpatient Medications   Medication Sig Dispense Refill    tamsulosin (FLOMAX) 0.4 MG capsule Take 1 Capsule by mouth 1/2 hour after breakfast. 90 Capsule 1    methylPREDNISolone (MEDROL DOSEPAK) 4 MG Tablet Therapy Pack 6 tabs day 1, 5 tabs day 2, 4 tabs day 3, 3 tabs day 4, 2 tabs day 5, 1 tab day 6. Take with food. 21 Tablet 0    albuterol 108 (90 Base) MCG/ACT Aero Soln inhalation aerosol Inhale 1-2 Puffs every four hours as needed for Shortness of Breath. 1 Each 0    cyanocobalamin (VITAMIN B12) 1000 MCG Tab Take 1 Tablet by mouth every day.       No current facility-administered medications for this visit.     He  has no past medical history on file.    I reviewed patient's problem list, allergies, medications, family hx, social hx with patient and update EPIC.        Objective:     /78 (BP Location: Left arm, Patient Position: Sitting, BP Cuff Size: Large adult)   Pulse 87   Temp 37 °C  "(98.6 °F) (Temporal)   Ht 1.702 m (5' 7\")   Wt 110 kg (241 lb 6.5 oz)   SpO2 95%  Body mass index is 37.81 kg/m².    Physical Exam:  Constitutional: awake, alert, in no distress.  Skin: Warm, dry, good turgor, no rashes, bruises, ulcers in visible areas.  Eye: conjunctiva clear, lids neg for edema or lesions.  ENMT: Bilateral middle ear effusion noted.  Pale and congested nasal mucosa. Lips without lesions, good dentition, pale posterior oropharynx with copious postnasal drips.  Neck: Trachea midline, no masses, no thyromegaly. No cervical or supraclavicular lymphadenopathy  Respiratory: Unlabored respiratory effort, lungs clear to auscultation, no wheezes, no rales.  Cardiovascular: Normal S1, S2, no murmur, no pedal edema.  Psych: Oriented x3, affect and mood wnl, intact judgement and insight.       Assessment and Plan:   The following treatment plan was discussed    1. Benign prostatic hyperplasia with urinary frequency  History and exam are concerning for BPH.  Urinalysis is not concerning for urinary tract infection.  Will start a trial of tamsulosin.  - POCT Urinalysis  - tamsulosin (FLOMAX) 0.4 MG capsule; Take 1 Capsule by mouth 1/2 hour after breakfast.  Dispense: 90 Capsule; Refill: 1  -Follow-up in 4 weeks    2. Allergic rhinitis, unspecified seasonality, unspecified trigger  History and exam are concerning for poorly controlled allergic rhinitis.  - Nasal saline irrigation 2-3 times per day to reduce allergen load  - Over-the-counter nasal steroid (Flonase, NasoNex, or Nasacort)   - Over-the-counter oral anti-histamine PRN (Claritin, Allegra, or Zyrtec)    3. Acute JADE (middle ear effusion), bilateral  History and exam are concerning for moderate bilateral middle ear effusion.   - methylPREDNISolone (MEDROL DOSEPAK) 4 MG Tablet Therapy Pack; 6 tabs day 1, 5 tabs day 2, 4 tabs day 3, 3 tabs day 4, 2 tabs day 5, 1 tab day 6. Take with food.  Dispense: 21 Tablet; Refill: 0  -Treat allergic rhinitis as " above    4. Vitamin D insufficiency  Previous lab was notable for vitamin D insufficiency.  Patient is taking 2000 units of vitamin D regularly.    Ivanna Moreland M.D.      Followup: Return for Multiple issues.    Please note that this dictation was created using voice recognition software. I have made every reasonable attempt to correct obvious errors, but I expect that there are errors of grammar and possibly content that I did not discover before finalizing the note.

## 2024-03-01 ENCOUNTER — TELEPHONE (OUTPATIENT)
Dept: MEDICAL GROUP | Facility: MEDICAL CENTER | Age: 54
End: 2024-03-01
Payer: COMMERCIAL

## 2024-03-29 ENCOUNTER — OFFICE VISIT (OUTPATIENT)
Dept: MEDICAL GROUP | Facility: MEDICAL CENTER | Age: 54
End: 2024-03-29
Payer: COMMERCIAL

## 2024-03-29 VITALS
WEIGHT: 243.94 LBS | HEART RATE: 65 BPM | SYSTOLIC BLOOD PRESSURE: 122 MMHG | HEIGHT: 67 IN | TEMPERATURE: 99.1 F | BODY MASS INDEX: 38.29 KG/M2 | OXYGEN SATURATION: 94 % | DIASTOLIC BLOOD PRESSURE: 74 MMHG

## 2024-03-29 DIAGNOSIS — Z11.4 ENCOUNTER FOR SCREENING FOR HIV: ICD-10-CM

## 2024-03-29 DIAGNOSIS — R35.0 BENIGN PROSTATIC HYPERPLASIA WITH URINARY FREQUENCY: ICD-10-CM

## 2024-03-29 DIAGNOSIS — E66.9 OBESITY (BMI 30-39.9): ICD-10-CM

## 2024-03-29 DIAGNOSIS — Z23 NEED FOR VACCINATION: ICD-10-CM

## 2024-03-29 DIAGNOSIS — E55.9 VITAMIN D INSUFFICIENCY: ICD-10-CM

## 2024-03-29 DIAGNOSIS — N40.1 BENIGN PROSTATIC HYPERPLASIA WITH URINARY FREQUENCY: ICD-10-CM

## 2024-03-29 DIAGNOSIS — Z00.00 PE (PHYSICAL EXAM), ANNUAL: ICD-10-CM

## 2024-03-29 DIAGNOSIS — J30.9 ALLERGIC RHINITIS, UNSPECIFIED SEASONALITY, UNSPECIFIED TRIGGER: ICD-10-CM

## 2024-03-29 PROBLEM — E78.00 PURE HYPERCHOLESTEROLEMIA: Status: RESOLVED | Noted: 2022-09-09 | Resolved: 2024-03-29

## 2024-03-29 PROBLEM — R06.2 BILATERAL WHEEZING: Status: RESOLVED | Noted: 2023-09-08 | Resolved: 2024-03-29

## 2024-03-29 PROBLEM — M54.42 CHRONIC BILATERAL LOW BACK PAIN WITH BILATERAL SCIATICA: Status: RESOLVED | Noted: 2023-04-12 | Resolved: 2024-03-29

## 2024-03-29 PROBLEM — M25.561 ACUTE PAIN OF RIGHT KNEE: Status: RESOLVED | Noted: 2020-12-18 | Resolved: 2024-03-29

## 2024-03-29 PROBLEM — G89.29 CHRONIC BILATERAL LOW BACK PAIN WITH BILATERAL SCIATICA: Status: RESOLVED | Noted: 2023-04-12 | Resolved: 2024-03-29

## 2024-03-29 PROBLEM — R73.03 PREDIABETES: Status: RESOLVED | Noted: 2018-08-24 | Resolved: 2024-03-29

## 2024-03-29 PROBLEM — R20.2 TINGLING: Status: RESOLVED | Noted: 2020-07-10 | Resolved: 2024-03-29

## 2024-03-29 PROBLEM — M25.512 CHRONIC LEFT SHOULDER PAIN: Status: RESOLVED | Noted: 2023-04-12 | Resolved: 2024-03-29

## 2024-03-29 PROBLEM — G89.29 CHRONIC LEFT SHOULDER PAIN: Status: RESOLVED | Noted: 2023-04-12 | Resolved: 2024-03-29

## 2024-03-29 PROBLEM — M54.41 CHRONIC BILATERAL LOW BACK PAIN WITH BILATERAL SCIATICA: Status: RESOLVED | Noted: 2023-04-12 | Resolved: 2024-03-29

## 2024-03-29 RX ORDER — FEXOFENADINE HCL 180 MG/1
180 TABLET ORAL DAILY
COMMUNITY

## 2024-03-29 RX ORDER — TAMSULOSIN HYDROCHLORIDE 0.4 MG/1
0.4 CAPSULE ORAL
Qty: 90 CAPSULE | Refills: 3 | Status: SHIPPED | OUTPATIENT
Start: 2024-03-29

## 2024-03-29 ASSESSMENT — FIBROSIS 4 INDEX: FIB4 SCORE: 0.66

## 2024-03-29 NOTE — PROGRESS NOTES
"Subjective:   CC: establish care, annual PE    HPI:     Devin Montgomery is a 54 y.o. male, established patient of the clinic.     Patient is overall healthy, no major medical or surgical history.  Patient is , sexually active.  He was recently diagnosed with allergic rhinitis.  His symptoms are controlled with Allegra and Flonase.  He was recently diagnosed with BPH.  His symptoms are controlled with tamsulosin 0.4 mg daily.  Body mass index is 38.21 kg/m².  Patient had gastric sleeve surgery in the past.  He is now working on dietary and lifestyle modification for weight loss.  Negative history of illicit drugs, alcohol, tobacco abuse.  He takes 2000 units of vitamin D daily given history of vitamin D insufficiency.  Recent labs showed resolution hyperlipidemia and prediabetes.    Current medicines (including changes today)  Current Outpatient Medications   Medication Sig Dispense Refill    fexofenadine (ALLEGRA ALLERGY) 180 MG tablet Take 180 mg by mouth every day.      tamsulosin (FLOMAX) 0.4 MG capsule Take 1 Capsule by mouth 1/2 hour after breakfast. 90 Capsule 3    albuterol 108 (90 Base) MCG/ACT Aero Soln inhalation aerosol Inhale 1-2 Puffs every four hours as needed for Shortness of Breath. 1 Each 0     No current facility-administered medications for this visit.     He  has no past medical history on file.    I reviewed patient's problem list, allergies, medications, family hx, social hx with patient and update EPIC.        Objective:     /74 (BP Location: Left arm, Patient Position: Sitting, BP Cuff Size: Adult long)   Pulse 65   Temp 37.3 °C (99.1 °F) (Temporal)   Ht 1.702 m (5' 7\")   Wt 111 kg (243 lb 15 oz)   SpO2 94%  Body mass index is 38.21 kg/m².    Physical Exam:  Constitutional: awake, alert, in no distress.  Skin: Warm, dry, good turgor, no rashes, bruises, ulcers in visible areas.  Eye: conjunctiva clear, lids neg for edema or lesions.  ENMT: TM and auditory " canals wnl. Oral and nasal mucosa wnl. Lips without lesions, good dentition, oropharynx clear.  Neck: Trachea midline, no masses, no thyromegaly. No cervical or supraclavicular lymphadenopathy  Respiratory: Unlabored respiratory effort, lungs clear to auscultation, no wheezes, no rales.  Cardiovascular: Normal S1, S2, no murmur, no pedal edema.  Psych: Oriented x3, affect and mood wnl, intact judgement and insight.       Assessment and Plan:   The following treatment plan was discussed    1. Vitamin D insufficiency  -Continue 2000 units of vitamin D daily.    2. Benign prostatic hyperplasia with urinary frequency  Chronic, controlled with tamsulosin 0.5 mg daily, no s/e reported, will continue.    - tamsulosin (FLOMAX) 0.4 MG capsule; Take 1 Capsule by mouth 1/2 hour after breakfast.  Dispense: 90 Capsule; Refill: 3    3. Obesity (BMI 30-39.9)  - Patient identified as having weight management issue.  Appropriate orders and counseling given.    4. Encounter for screening for HIV  - HIV AG/AB COMBO ASSAY SCREENING; Future    5. Need for vaccination  - Hepatitis B Vaccine Adult IM    6. Allergic rhinitis, unspecified seasonality, unspecified trigger  Chronic, controlled with Flonase and Allegra, no s/e reported, will continue.      7. PE (physical exam), annual  Labs per orders  Immunization reviewed and discussed.  Patient was counseled about  diet, supplements, exercises.   Preventive cares reviewed, discussed, and updated as appropriate.     - Comp Metabolic Panel; Future  - CBC WITH DIFFERENTIAL; Future  - HEMOGLOBIN A1C; Future  - Lipid Profile; Future       Follow-up: Return in about 1 year (around 3/29/2025) for annual PE.    Ivanna Moreland M.D.      Please note that this dictation was created using voice recognition software. I have made every reasonable attempt to correct obvious errors, but I expect that there are errors of grammar and possibly content that I did not discover before finalizing the note.

## 2024-04-04 ENCOUNTER — PATIENT MESSAGE (OUTPATIENT)
Dept: MEDICAL GROUP | Facility: MEDICAL CENTER | Age: 54
End: 2024-04-04
Payer: COMMERCIAL

## 2024-04-04 DIAGNOSIS — R35.0 BENIGN PROSTATIC HYPERPLASIA WITH URINARY FREQUENCY: ICD-10-CM

## 2024-04-04 DIAGNOSIS — N40.1 BENIGN PROSTATIC HYPERPLASIA WITH URINARY FREQUENCY: ICD-10-CM

## 2024-04-04 NOTE — PATIENT COMMUNICATION
2. SPECIFIC Action To Be Taken: Orders pending, please sign.    3. Diagnosis/Clinical Reason for Request: persistent urine problems    4. Specialty & Provider Name/Lab/Imaging Location: urology    5. Is appointment scheduled for requested order/referral: no

## 2024-04-07 ENCOUNTER — PATIENT MESSAGE (OUTPATIENT)
Dept: MEDICAL GROUP | Facility: MEDICAL CENTER | Age: 54
End: 2024-04-07
Payer: COMMERCIAL

## 2024-04-16 ENCOUNTER — OFFICE VISIT (OUTPATIENT)
Dept: UROLOGY | Facility: MEDICAL CENTER | Age: 54
End: 2024-04-16
Payer: COMMERCIAL

## 2024-04-16 VITALS
HEIGHT: 67 IN | SYSTOLIC BLOOD PRESSURE: 142 MMHG | BODY MASS INDEX: 37.81 KG/M2 | DIASTOLIC BLOOD PRESSURE: 92 MMHG | TEMPERATURE: 98.5 F | OXYGEN SATURATION: 98 % | WEIGHT: 240.9 LBS | HEART RATE: 71 BPM

## 2024-04-16 DIAGNOSIS — R35.0 BENIGN PROSTATIC HYPERPLASIA WITH URINARY FREQUENCY: ICD-10-CM

## 2024-04-16 DIAGNOSIS — N50.812 PAIN IN BOTH TESTICLES: ICD-10-CM

## 2024-04-16 DIAGNOSIS — N50.811 TESTICULAR PAIN, RIGHT: ICD-10-CM

## 2024-04-16 DIAGNOSIS — N50.811 PAIN IN BOTH TESTICLES: ICD-10-CM

## 2024-04-16 DIAGNOSIS — N40.1 BENIGN PROSTATIC HYPERPLASIA WITH URINARY FREQUENCY: ICD-10-CM

## 2024-04-16 LAB
POC POST-VOID: NORMAL
POC PRE-VOID: 57 ML

## 2024-04-16 PROCEDURE — 3080F DIAST BP >= 90 MM HG: CPT

## 2024-04-16 PROCEDURE — 3077F SYST BP >= 140 MM HG: CPT

## 2024-04-16 PROCEDURE — 51798 US URINE CAPACITY MEASURE: CPT

## 2024-04-16 PROCEDURE — 99204 OFFICE O/P NEW MOD 45 MIN: CPT

## 2024-04-16 RX ORDER — TADALAFIL 5 MG/1
5 TABLET ORAL DAILY
Qty: 30 TABLET | Refills: 3 | Status: SHIPPED | OUTPATIENT
Start: 2024-04-16

## 2024-04-16 ASSESSMENT — FIBROSIS 4 INDEX: FIB4 SCORE: 0.66

## 2024-04-16 NOTE — PROGRESS NOTES
"        Subjective  Devin Montgomery is a 54 y.o. male who presents today for evaluation of BPH with lower urinary tract symptoms.    Patient is currently taking Flomax.  PSA in 2024 was 0.86    Patient reports bilateral testicular pain x2-3 weeks. Pain is sudden onset and it goes away and at times can radiate to his suprapubic area. He has not tried any medications for this. No new sexual partners, no concerns of STI.     Patient reports dysuria describes this pain as \"knifes-stabbing pain coming down his penis\" after he urinates, hesitancy/difficulty starting stream,dribbling, and urgency without leakage.     No history of kidney stones, some intermittent sharp pain from his right side to r flank- sometimes left side. No UTI symptoms which started when he started taking flomax    Patient denies hematuria, urgency with leakage, a weak urinary stream, sensation of incomplete emptying- feels like he has to urinate after just going to the bathroom, and nocturia- voids 1 time per night.     Patient has regular bowel movements soft.    He has decreased sex drive since starting the Flomax and increased pain that he experienced when he ejaculates.       Family History   Problem Relation Age of Onset    No Known Problems Mother     No Known Problems Father     No Known Problems Sister     No Known Problems Brother     No Known Problems Sister        Social History     Socioeconomic History    Marital status:      Spouse name: Not on file    Number of children: Not on file    Years of education: Not on file    Highest education level: Not on file   Occupational History    Not on file   Tobacco Use    Smoking status: Former     Current packs/day: 0.00     Average packs/day: 0.5 packs/day for 19.0 years (9.5 ttl pk-yrs)     Types: Cigarettes     Start date:      Quit date: 2000     Years since quittin.3    Smokeless tobacco: Never   Vaping Use    Vaping Use: Never used   Substance and " "Sexual Activity    Alcohol use: Not Currently    Drug use: No    Sexual activity: Yes     Partners: Female   Other Topics Concern    Not on file   Social History Narrative    ** Merged History Encounter **          Social Determinants of Health     Financial Resource Strain: Not on file   Food Insecurity: Not on file   Transportation Needs: Not on file   Physical Activity: Not on file   Stress: Not on file   Social Connections: Not on file   Intimate Partner Violence: Not on file   Housing Stability: Not on file       Past Surgical History:   Procedure Laterality Date    [place] LAP, HCENTE RESTRICT PROC, LONGITUDINAL GAS*  9/2/2020    Procedure: GASTRECTOMY, SLEEVE, LAPAROSCOPIC;  Surgeon: Bud Hayward M.D.;  Location: SURGERY [facility];  Service: General    [place] LAP,BILIARY TRACT,UNLISTED  9/2/2020    Procedure: BIOPSY, LIVER, LAPAROSCOPIC;  Surgeon: Bud Hayward M.D.;  Location: SURGERY [facility];  Service: General    OTHER  1999    tonsillectomy       No past medical history on file.    Current Outpatient Medications   Medication Sig Dispense Refill    fexofenadine (ALLEGRA ALLERGY) 180 MG tablet Take 180 mg by mouth every day.      tamsulosin (FLOMAX) 0.4 MG capsule Take 1 Capsule by mouth 1/2 hour after breakfast. 90 Capsule 3    albuterol 108 (90 Base) MCG/ACT Aero Soln inhalation aerosol Inhale 1-2 Puffs every four hours as needed for Shortness of Breath. 1 Each 0     No current facility-administered medications for this visit.       Allergies   Allergen Reactions    Seasonal        Objective  BP (!) 142/92 (BP Location: Right arm, Patient Position: Sitting, BP Cuff Size: Adult)   Pulse 71   Temp 36.9 °C (98.5 °F) (Temporal)   Ht 1.702 m (5' 7\")   Wt 109 kg (240 lb 14.4 oz)   SpO2 98%   Physical Exam  Constitutional:       Appearance: Normal appearance.   HENT:      Head: Normocephalic and atraumatic.   Eyes:      Extraocular Movements: Extraocular movements intact.   Pulmonary:      Effort: Pulmonary " effort is normal.   Genitourinary:     Comments: PVR 57  Skin:     General: Skin is warm and dry.   Neurological:      Mental Status: He is alert.   Psychiatric:         Mood and Affect: Mood normal.         Behavior: Behavior normal.         Labs:   PSA   Lab Results   Component Value Date/Time    PSATOTAL 0.86 01/15/2024 0621         Imaging:   None     Assessment    For scrotal pain, physical exam showed a mild swelling potentially epididymitis on that right testicle.  Assessment was tender to touch only on that right side no spermatocele or varicocele or hydrocele noted bilaterally.  I ordered a scrotal ultrasound to ensure patient does not have epididymitis.  Pending results we will treat accordingly.  I also did not palpate any epididymal cysts    Because of patient's bothersome side effects related to Flomax such as pain when ejaculating, decreased libido since starting Flomax, I have started patient on tadalafil 5 mg p.o. daily.  I have encouraged patient to continue taking his saw palmetto considering patient is not on any anticoagulants.  Patient is on a few other herbal remedies where he is reporting that they are making a difference for his symptoms.    At next appointment, we will assess if his right side pain radiating to his flank area dissipates, as this pain was noted to start just a few weeks ago, noting this pain happened around the time when he started the Flomax.  We will see if this pain resolves as patient discontinued Flomax    Return to clinic in 8 to 6 weeks    Plan    Problem List Items Addressed This Visit       Benign prostatic hyperplasia with urinary frequency    Relevant Orders    POCT Bladder Scan (Completed)

## 2024-04-16 NOTE — LETTER
April 16, 2024    To Whom It May Concern:         This is confirmation that Devin Montgomery attended his scheduled appointment with JEF Carranza on 4/16/24.         If you have any questions please do not hesitate to call me at the phone number listed below.    Sincerely,      Brigitte LEBRON  [phone]

## 2024-05-02 ENCOUNTER — TELEPHONE (OUTPATIENT)
Dept: UROLOGY | Facility: MEDICAL CENTER | Age: 54
End: 2024-05-02
Payer: COMMERCIAL

## 2024-05-02 NOTE — TELEPHONE ENCOUNTER
VOICEMAIL  1. Caller Name: Shahla (spouse)                      Call Back Number: [phone]    2. Message: patient spouse called and lm wanting to see if there is an alternative medication from the Tadalafil. She stated Devin mentioned that it is not working as well as before.  Please review and advise    3. Patient approves office to leave a detailed voicemail/MyChart message: yes

## 2024-05-09 DIAGNOSIS — N40.1 BENIGN PROSTATIC HYPERPLASIA WITH URINARY FREQUENCY: ICD-10-CM

## 2024-05-09 DIAGNOSIS — R35.0 BENIGN PROSTATIC HYPERPLASIA WITH URINARY FREQUENCY: ICD-10-CM

## 2024-05-09 RX ORDER — OXYBUTYNIN CHLORIDE 10 MG/1
10 TABLET, EXTENDED RELEASE ORAL DAILY
Qty: 30 TABLET | Refills: 3 | Status: SHIPPED | OUTPATIENT
Start: 2024-05-09 | End: 2024-05-14 | Stop reason: SDUPTHER

## 2024-05-09 NOTE — PROGRESS NOTES
I have sent oxybutynin 10mg po er to [facility] pharmacy. Patient DC flomax at last visit and was started on tadalafil instead r/t painful ejaculation    His most bothersome complaint noted was nocturia and frequency. We will see if this medication makes a difference for his symptoms.    He can DC this medication at anytime without need for taper.

## 2024-05-14 ENCOUNTER — OFFICE VISIT (OUTPATIENT)
Dept: UROLOGY | Facility: MEDICAL CENTER | Age: 54
End: 2024-05-14
Payer: COMMERCIAL

## 2024-05-14 VITALS
SYSTOLIC BLOOD PRESSURE: 125 MMHG | HEART RATE: 65 BPM | OXYGEN SATURATION: 97 % | BODY MASS INDEX: 36.66 KG/M2 | DIASTOLIC BLOOD PRESSURE: 84 MMHG | WEIGHT: 233.6 LBS | HEIGHT: 67 IN | TEMPERATURE: 98.6 F

## 2024-05-14 DIAGNOSIS — N50.812 PAIN IN BOTH TESTICLES: ICD-10-CM

## 2024-05-14 DIAGNOSIS — R35.0 BENIGN PROSTATIC HYPERPLASIA WITH URINARY FREQUENCY: ICD-10-CM

## 2024-05-14 DIAGNOSIS — N32.81 OAB (OVERACTIVE BLADDER): ICD-10-CM

## 2024-05-14 DIAGNOSIS — N50.811 PAIN IN BOTH TESTICLES: ICD-10-CM

## 2024-05-14 DIAGNOSIS — N40.1 BENIGN PROSTATIC HYPERPLASIA WITH URINARY FREQUENCY: ICD-10-CM

## 2024-05-14 LAB
POC POST-VOID: NORMAL
POC PRE-VOID: 143 ML

## 2024-05-14 PROCEDURE — 51798 US URINE CAPACITY MEASURE: CPT

## 2024-05-14 PROCEDURE — 99213 OFFICE O/P EST LOW 20 MIN: CPT | Mod: 25

## 2024-05-14 PROCEDURE — 3079F DIAST BP 80-89 MM HG: CPT

## 2024-05-14 PROCEDURE — 3074F SYST BP LT 130 MM HG: CPT

## 2024-05-14 RX ORDER — OXYBUTYNIN CHLORIDE 10 MG/1
10 TABLET, EXTENDED RELEASE ORAL DAILY
Qty: 90 TABLET | Refills: 3 | Status: SHIPPED | OUTPATIENT
Start: 2024-05-14

## 2024-05-14 RX ORDER — TADALAFIL 5 MG/1
5 TABLET ORAL DAILY
Qty: 90 TABLET | Refills: 3 | Status: SHIPPED | OUTPATIENT
Start: 2024-05-14

## 2024-05-14 ASSESSMENT — FIBROSIS 4 INDEX: FIB4 SCORE: 0.66

## 2024-05-14 NOTE — PROGRESS NOTES
Subjective  Devin Montgomery is a 54 y.o. male who presents today for follow  up of BPH with lower urinary tract symptoms.  Patient was seen in 2024 and was already on Flomax and discontinued it because he reported that he was having right-sided pain radiating to his flank area.      At last visit we started him on tadalafil 5 mg p.o. daily for his bothersome lower urinary tract symptoms such as dysuria, hesitancy, difficulty starting stream, dribbling and urgency without leakage.  He reports improvement in his bothersome lower urinary tract symptoms.  He reports that his nocturia has improved and he is only getting up 1 time per night to urinate, his hesitancy is also improved, along with his dysuria.    With Oxybutynin 10mg, his urgency and nocturia has improved and he has just started this medication times a few days ago. He has regular BM daily    He was having bilateral testicular pain.  I ordered a ultrasound of his testicles which has not been completed at this time. He reports that he does have some intermittent burning at the base of his penis and throughout his penis, for this we discussed moving forward with getting the ultrasound          Family History   Problem Relation Age of Onset    No Known Problems Mother     No Known Problems Father     No Known Problems Sister     No Known Problems Brother     No Known Problems Sister        Social History     Socioeconomic History    Marital status:      Spouse name: Not on file    Number of children: Not on file    Years of education: Not on file    Highest education level: Not on file   Occupational History    Not on file   Tobacco Use    Smoking status: Former     Current packs/day: 0.00     Average packs/day: 0.5 packs/day for 19.0 years (9.5 ttl pk-yrs)     Types: Cigarettes     Start date:      Quit date: 2000     Years since quittin.3    Smokeless tobacco: Never   Vaping Use    Vaping Use: Never used   Substance  "and Sexual Activity    Alcohol use: Not Currently    Drug use: No    Sexual activity: Yes     Partners: Female   Other Topics Concern    Not on file   Social History Narrative    ** Merged History Encounter **          Social Determinants of Health     Financial Resource Strain: Not on file   Food Insecurity: Not on file   Transportation Needs: Not on file   Physical Activity: Not on file   Stress: Not on file   Social Connections: Not on file   Intimate Partner Violence: Not on file   Housing Stability: Not on file       Past Surgical History:   Procedure Laterality Date    [place] LAP, CHENTE RESTRICT PROC, LONGITUDINAL GAS*  9/2/2020    Procedure: GASTRECTOMY, SLEEVE, LAPAROSCOPIC;  Surgeon: Bud Hayward M.D.;  Location: SURGERY [facility];  Service: General    [place] LAP,BILIARY TRACT,UNLISTED  9/2/2020    Procedure: BIOPSY, LIVER, LAPAROSCOPIC;  Surgeon: Bud Hayward M.D.;  Location: SURGERY [facility];  Service: General    OTHER  1999    tonsillectomy       No past medical history on file.    Current Outpatient Medications   Medication Sig Dispense Refill    oxybutynin SR (DITROPAN-XL) 10 MG CR tablet Take 1 Tablet by mouth every day. 90 Tablet 3    tadalafil (CIALIS) 5 MG tablet Take 1 Tablet by mouth every day. 90 Tablet 3    fexofenadine (ALLEGRA ALLERGY) 180 MG tablet Take 180 mg by mouth every day.      albuterol 108 (90 Base) MCG/ACT Aero Soln inhalation aerosol Inhale 1-2 Puffs every four hours as needed for Shortness of Breath. 1 Each 0     No current facility-administered medications for this visit.       Allergies   Allergen Reactions    Seasonal        Objective  /84 (BP Location: Left arm, Patient Position: Sitting, BP Cuff Size: Adult)   Pulse 65   Temp 37 °C (98.6 °F) (Temporal)   Ht 1.702 m (5' 7\")   Wt 106 kg (233 lb 9.6 oz)   SpO2 97%   Physical Exam  Constitutional:       Appearance: Normal appearance.   HENT:      Head: Normocephalic and atraumatic.   Eyes:      Extraocular Movements: " Extraocular movements intact.   Pulmonary:      Effort: Pulmonary effort is normal.      Breath sounds: Normal breath sounds.   Genitourinary:     Comments:   Skin:     General: Skin is warm and dry.   Neurological:      Mental Status: He is alert.   Psychiatric:         Mood and Affect: Mood normal.         Behavior: Behavior normal.         Labs:   PSA   Lab Results   Component Value Date/Time    PSATOTAL 0.86 01/15/2024 0621       Imaging:     Scrotal ultrasound to be done today    Assessment    At this time, we will continue oxybutynin 10 mg p.o. daily for patient's urgency, and nocturia.  I have informed patient that he can stop this medication at any point in time without need for taper.  I have also reminded him that this medication can cause dry mouth, dry eye, and constipation.  Patient was only able to  this medication a couple days ago, and reports that symptoms have improved, however I informed him that if he does not see a difference in his symptoms that he can discontinue this medication in 4 weeks.    For patient's bothersome lower urinary tract symptoms such as hesitancy, weak urinary stream, nocturia, dysuria, I have informed him to continue tadalafil 5 mg p.o. daily.  I have sent him a year refill to [facility]    Return to clinic in 6 months or sooner if you develop any bothersome urinary tract symptoms    Plan    Problem List Items Addressed This Visit       Benign prostatic hyperplasia with urinary frequency     Continue oxybutynin 10 mg p.o. extended release    Continue tadalafil 5 mg p.o. daily         Relevant Medications    oxybutynin SR (DITROPAN-XL) 10 MG CR tablet    tadalafil (CIALIS) 5 MG tablet    Other Relevant Orders    POCT Bladder Scan (Completed)    Pain in both testicles     Ultrasound to be done today, will message patient with results or schedule him sooner in there is anything suspicious          Other Visit Diagnoses       OAB (overactive bladder)        Relevant  Medications    oxybutynin SR (DITROPAN-XL) 10 MG CR tablet

## 2024-05-14 NOTE — PATIENT INSTRUCTIONS
After 4 weeks if the Oxybutynin isnt improving your urgency and how many times a night you get up (which is once) you can discontinue it  2. Continue Tadalafil 5mg daily

## 2024-05-14 NOTE — ASSESSMENT & PLAN NOTE
Ultrasound to be done today, will message patient with results or schedule him sooner in there is anything suspicious

## 2024-06-15 ENCOUNTER — HOSPITAL ENCOUNTER (OUTPATIENT)
Dept: RADIOLOGY | Facility: MEDICAL CENTER | Age: 54
End: 2024-06-15
Payer: COMMERCIAL

## 2024-06-15 DIAGNOSIS — N50.811 PAIN IN BOTH TESTICLES: ICD-10-CM

## 2024-06-15 DIAGNOSIS — N50.812 PAIN IN BOTH TESTICLES: ICD-10-CM

## 2024-06-15 PROCEDURE — 76870 US EXAM SCROTUM: CPT

## 2024-06-20 ENCOUNTER — PATIENT MESSAGE (OUTPATIENT)
Dept: MEDICAL GROUP | Facility: MEDICAL CENTER | Age: 54
End: 2024-06-20
Payer: COMMERCIAL

## 2024-06-25 ENCOUNTER — TELEPHONE (OUTPATIENT)
Dept: UROLOGY | Facility: MEDICAL CENTER | Age: 54
End: 2024-06-25
Payer: COMMERCIAL

## 2024-07-03 ENCOUNTER — OFFICE VISIT (OUTPATIENT)
Dept: UROLOGY | Facility: MEDICAL CENTER | Age: 54
End: 2024-07-03
Payer: COMMERCIAL

## 2024-07-03 DIAGNOSIS — N50.811 PAIN IN BOTH TESTICLES: ICD-10-CM

## 2024-07-03 DIAGNOSIS — R35.0 BENIGN PROSTATIC HYPERPLASIA WITH URINARY FREQUENCY: Primary | ICD-10-CM

## 2024-07-03 DIAGNOSIS — N50.812 PAIN IN BOTH TESTICLES: ICD-10-CM

## 2024-07-03 DIAGNOSIS — N40.1 BENIGN PROSTATIC HYPERPLASIA WITH URINARY FREQUENCY: Primary | ICD-10-CM

## 2024-07-03 DIAGNOSIS — N41.1 CHRONIC PROSTATITIS: ICD-10-CM

## 2024-07-03 LAB
POC POST-VOID: 0 ML
POC PRE-VOID: NORMAL

## 2024-07-03 PROCEDURE — 51798 US URINE CAPACITY MEASURE: CPT | Performed by: PHYSICIAN ASSISTANT

## 2024-07-03 PROCEDURE — 99214 OFFICE O/P EST MOD 30 MIN: CPT | Mod: 25 | Performed by: PHYSICIAN ASSISTANT

## 2024-07-03 RX ORDER — ALFUZOSIN HYDROCHLORIDE 10 MG/1
10 TABLET, EXTENDED RELEASE ORAL DAILY
Qty: 30 TABLET | Refills: 2 | Status: SHIPPED | OUTPATIENT
Start: 2024-07-03

## 2024-09-05 ENCOUNTER — OFFICE VISIT (OUTPATIENT)
Dept: UROLOGY | Facility: MEDICAL CENTER | Age: 54
End: 2024-09-05
Payer: COMMERCIAL

## 2024-09-05 DIAGNOSIS — N50.811 PAIN IN BOTH TESTICLES: ICD-10-CM

## 2024-09-05 DIAGNOSIS — R35.0 BENIGN PROSTATIC HYPERPLASIA WITH URINARY FREQUENCY: Primary | ICD-10-CM

## 2024-09-05 DIAGNOSIS — N41.1 CHRONIC PROSTATITIS: ICD-10-CM

## 2024-09-05 DIAGNOSIS — N40.1 BENIGN PROSTATIC HYPERPLASIA WITH URINARY FREQUENCY: Primary | ICD-10-CM

## 2024-09-05 DIAGNOSIS — N50.812 PAIN IN BOTH TESTICLES: ICD-10-CM

## 2024-09-05 LAB
POC POST-VOID: 10 ML
POC PRE-VOID: NORMAL

## 2024-09-05 PROCEDURE — 99214 OFFICE O/P EST MOD 30 MIN: CPT | Performed by: PHYSICIAN ASSISTANT

## 2024-09-05 PROCEDURE — 51798 US URINE CAPACITY MEASURE: CPT | Performed by: PHYSICIAN ASSISTANT

## 2024-09-05 RX ORDER — ALFUZOSIN HYDROCHLORIDE 10 MG/1
10 TABLET, EXTENDED RELEASE ORAL DAILY
Qty: 30 TABLET | Refills: 2 | Status: SHIPPED | OUTPATIENT
Start: 2024-09-05

## 2024-09-05 NOTE — PROGRESS NOTES
Reason for visit:   Bilateral scrotal pain  Inflammatory prostatitis    HPI   54 y.o. male presenting for review of chronic bilateral scrotal pain and inflammatory prostatitis. The patient was last seen in the office on 7/3/24 and I recommended a trial of alfuzosin in addition to tadalafil 5 mg daily.    Overall the patient does feel that urinary symptoms are improving. He reports that OAB symptoms are much better. He does still experience occasional scrotal soreness. He equates this discomfort to the ache that is felt after being kicked in the scrotum. This is not severe in nature and does resolve on its own. He is not elevating scrotum or using scrotal support.     The patient states that he is improving his daily water intake. He is also working on making improvements with his diet. He reports that he is very motivated.    The patient denies any bothersome side effects associated with alfuzosin.     He denies any gross hematuria or dysuria.     6/15/24 Scrotal US: Normal, incidental findings of bilateral epididymal head cysts.    PVR: 10 mL    7/3/24 OV:  HPI   54 y.o. male presenting for review of BPH with OAB and complaints of bilateral scrotal pain. The patient was last seen in the office on 5/14/24 by Brigitte Hou NP who recommended a scrotal US. He also continues on tadalafil 5 mg daily and oxybutinin ER 10 mg daily for BPH with OAB symptoms.     The patient still complains of bilateral scrotal pain that is intermittent but at least occurs daily. Sometimes the discomfort radiates to his SP region. He is also bothered by his urinary pattern. He describes burning in his penis, urinary frequency and urinary urgency. He has been diligent about good daily water intake and admits to taking an OTC herbal supplement for prostate health. His wife is very concerned about symptoms.      He is tolerating tadalafil 5 mg daily and does feel that it has slightly improved urinary symptoms. He never started the oxybutinin  and would like to stay off of it.      He denies any history of recurrent UTIs, visible hematuria and is not concerned for STIs.      PVR: 0 mL     5/14/24 OV with Brigitte Hou NP:  Subjective  Devin Montgomery is a 54 y.o. male who presents today for follow  up of BPH with lower urinary tract symptoms.  Patient was seen in April 2024 and was already on Flomax and discontinued it because he reported that he was having right-sided pain radiating to his flank area.       At last visit we started him on tadalafil 5 mg p.o. daily for his bothersome lower urinary tract symptoms such as dysuria, hesitancy, difficulty starting stream, dribbling and urgency without leakage.  He reports improvement in his bothersome lower urinary tract symptoms.  He reports that his nocturia has improved and he is only getting up 1 time per night to urinate, his hesitancy is also improved, along with his dysuria.     With Oxybutynin 10mg, his urgency and nocturia has improved and he has just started this medication times a few days ago. He has regular BM daily     He was having bilateral testicular pain.  I ordered a ultrasound of his testicles which has not been completed at this time. He reports that he does have some intermittent burning at the base of his penis and throughout his penis, for this we discussed moving forward with getting the ultrasound    Urologic PMH:    Denies     There were no vitals filed for this visit.      Physical Exam:   General:  Alert & Oriented, NAD     Assessment and plan:     Bilateral scrotal pain     -- Scrotal support and elevation reviewed  -- NSAIDs for discomfort PRN  -- Tub soaks  -- We reviewed again that scrotal pain can sometimes be related to lower back pain and/or prostatitis symptoms and that conservative care my help alleviate his occasional discomfort  -- We discussed an option for spermatic cord injection of steroids for pain relief, I cannot guarantee that this will improve  symptoms -- patient will think about it  -- Can also consider a gabapentin 300 mg QHS for chronic discomfort -- patient will also think about this     Chronic inflammatory prostatitis    -- PVR reviewed and patient is emptying his bladder well   -- Continue alfuzosin 10 mg daily -- refill sent and will consider a trial off of the medication at next visit  -- Continue tadalafil 5 mg daily  -- Encouraged hydration with at least 48-64 ounces of water per day   -- Suggested warm tub soaks and NSAID's for discomfort as needed   -- Counseled him on food triggers and instructed him to avoid ETOH particularly beer, spicy foods and caffeine   -- I will see him back in 2-3 months or sooner if any worrisome symptoms or concerns arise, and I have told the patient to call if he develops any fevers/chills or general feelings of being unwell     I personally reviewed the patient's pertinent medical records and labs/images available to me.     Byron Dickerson PA-C

## 2024-11-01 ENCOUNTER — OFFICE VISIT (OUTPATIENT)
Dept: UROLOGY | Facility: MEDICAL CENTER | Age: 54
End: 2024-11-01
Payer: COMMERCIAL

## 2024-11-01 DIAGNOSIS — N41.1 CHRONIC PROSTATITIS: ICD-10-CM

## 2024-11-01 DIAGNOSIS — N50.82 SCROTAL PAIN: ICD-10-CM

## 2024-11-01 DIAGNOSIS — N40.1 BENIGN PROSTATIC HYPERPLASIA WITH URINARY FREQUENCY: ICD-10-CM

## 2024-11-01 DIAGNOSIS — R35.0 BENIGN PROSTATIC HYPERPLASIA WITH URINARY FREQUENCY: ICD-10-CM

## 2024-11-01 PROCEDURE — 99212 OFFICE O/P EST SF 10 MIN: CPT | Performed by: PHYSICIAN ASSISTANT

## 2024-11-01 RX ORDER — ALFUZOSIN HYDROCHLORIDE 10 MG/1
10 TABLET, EXTENDED RELEASE ORAL DAILY
Qty: 30 TABLET | Refills: 2 | Status: SHIPPED | OUTPATIENT
Start: 2024-11-01

## 2024-11-01 NOTE — PROGRESS NOTES
Subjective  Devin Montgomery is a 54 y.o. male presenting for review of chronic bilateral scrotal pain and inflammatory prostatitis. The patient was last seen in the office by MODE Dickerson on 24. He was started on alfuzosin in addition to tadalafil 5 mg daily with symptoms improvement.     He is very pleased with the amount of symptom relief he has received from his medication regimen. He continues to experience occasional scrotal soreness; however, not to the degree he previously felt. He has followed MODE Matthews's recommendations to start warm tub soaks and NSAID's for discomfort as needed.     He no longer has to stop working due to pain, and would like to avoid any additional medications at this time.      The patient denies any bothersome side effects associated with alfuzosin.      He denies any gross hematuria or dysuria.       Family History   Problem Relation Age of Onset    No Known Problems Mother     No Known Problems Father     No Known Problems Sister     No Known Problems Brother     No Known Problems Sister        Social History     Socioeconomic History    Marital status:      Spouse name: Not on file    Number of children: Not on file    Years of education: Not on file    Highest education level: Not on file   Occupational History    Not on file   Tobacco Use    Smoking status: Former     Current packs/day: 0.00     Average packs/day: 0.5 packs/day for 19.0 years (9.5 ttl pk-yrs)     Types: Cigarettes     Start date:      Quit date: 2000     Years since quittin.8    Smokeless tobacco: Never   Vaping Use    Vaping status: Never Used   Substance and Sexual Activity    Alcohol use: Not Currently    Drug use: No    Sexual activity: Yes     Partners: Female   Other Topics Concern    Not on file   Social History Narrative    ** Merged History Encounter **          Social Determinants of Health     Financial Resource Strain: Not on file   Food Insecurity: Not on file    Transportation Needs: Not on file   Physical Activity: Not on file   Stress: Not on file   Social Connections: Not on file   Intimate Partner Violence: Not on file   Housing Stability: Not on file       Past Surgical History:   Procedure Laterality Date    OR LAP CHENTE RESTRICT PROC LONGITUDINAL GAS*  9/2/2020    Procedure: GASTRECTOMY, SLEEVE, LAPAROSCOPIC;  Surgeon: Bud Hayward M.D.;  Location: SURGERY [facility];  Service: General    OR LAP,BILIARY TRACT,UNLISTED  9/2/2020    Procedure: BIOPSY, LIVER, LAPAROSCOPIC;  Surgeon: Bud Hayward M.D.;  Location: SURGERY [facility];  Service: General    OTHER  1999    tonsillectomy       No past medical history on file.    Current Outpatient Medications   Medication Sig Dispense Refill    alfuzosin (UROXATRAL) 10 MG SR tablet Take 1 Tablet by mouth every day. 30 Tablet 2    tadalafil (CIALIS) 5 MG tablet Take 1 Tablet by mouth every day. 90 Tablet 3    fexofenadine (ALLEGRA ALLERGY) 180 MG tablet Take 180 mg by mouth every day.      albuterol 108 (90 Base) MCG/ACT Aero Soln inhalation aerosol Inhale 1-2 Puffs every four hours as needed for Shortness of Breath. 1 Each 0     No current facility-administered medications for this visit.       Allergies   Allergen Reactions    Seasonal        Objective  There were no vitals taken for this visit.  Physical Exam  Constitutional:       Appearance: Normal appearance.   Pulmonary:      Effort: Pulmonary effort is normal.   Skin:     General: Skin is warm and dry.   Neurological:      Mental Status: He is alert.   Psychiatric:         Mood and Affect: Mood normal.         Behavior: Behavior normal.         Labs:     Lab Results   Component Value Date/Time    PSATOTAL 0.86 01/15/2024 0621         Imaging: None    Assessment    Bilateral scrotal pain     - Scrotal support and elevation reviewed  - NSAIDs for discomfort PRN  - Tub soaks. Recommended to add Epsom salt to bath for increased relief.   - Consider gabapentin 300 mg  QHS for chronic discomfort -- patient would like to hold off on additional medications at this time.      Chronic inflammatory prostatitis     - Continue alfuzosin 10 mg daily   - Continue tadalafil 5 mg daily  - Encouraged hydration with at least 48-64 ounces of water per day   - Counseled him on food triggers and instructed him to avoid ETOH particularly beer, spicy foods and caffeine   - RTC in 3 months - pt would like to continue medication regimen at this time.        Plan    1. Scrotal pain    2. Chronic prostatitis

## 2024-12-30 ENCOUNTER — TELEPHONE (OUTPATIENT)
Dept: UROLOGY | Facility: MEDICAL CENTER | Age: 54
End: 2024-12-30
Payer: COMMERCIAL

## 2025-01-24 ENCOUNTER — APPOINTMENT (OUTPATIENT)
Dept: UROLOGY | Facility: MEDICAL CENTER | Age: 55
End: 2025-01-24
Payer: COMMERCIAL

## 2025-01-24 ENCOUNTER — HOSPITAL ENCOUNTER (OUTPATIENT)
Facility: MEDICAL CENTER | Age: 55
End: 2025-01-24
Attending: PHYSICIAN ASSISTANT
Payer: COMMERCIAL

## 2025-01-24 DIAGNOSIS — N40.1 BENIGN PROSTATIC HYPERPLASIA WITH URINARY FREQUENCY: ICD-10-CM

## 2025-01-24 DIAGNOSIS — R35.0 BENIGN PROSTATIC HYPERPLASIA WITH URINARY FREQUENCY: ICD-10-CM

## 2025-01-24 DIAGNOSIS — N41.1 CHRONIC PROSTATITIS: Primary | ICD-10-CM

## 2025-01-24 DIAGNOSIS — N50.82 SCROTAL PAIN: ICD-10-CM

## 2025-01-24 LAB
APPEARANCE UR: CLEAR
APPEARANCE UR: CLEAR
BILIRUB UR QL STRIP.AUTO: NEGATIVE
BILIRUB UR STRIP-MCNC: NORMAL MG/DL
COLOR UR AUTO: YELLOW
COLOR UR: YELLOW
GLUCOSE UR STRIP.AUTO-MCNC: NEGATIVE MG/DL
GLUCOSE UR STRIP.AUTO-MCNC: NORMAL MG/DL
KETONES UR STRIP.AUTO-MCNC: NEGATIVE MG/DL
KETONES UR STRIP.AUTO-MCNC: NORMAL MG/DL
LEUKOCYTE ESTERASE UR QL STRIP.AUTO: NEGATIVE
LEUKOCYTE ESTERASE UR QL STRIP.AUTO: NORMAL
MICRO URNS: NORMAL
NITRITE UR QL STRIP.AUTO: NEGATIVE
NITRITE UR QL STRIP.AUTO: NORMAL
PH UR STRIP.AUTO: 6 [PH] (ref 5–8)
PH UR STRIP.AUTO: 6 [PH] (ref 5–8)
POC POST-VOID: 57 ML
POC PRE-VOID: NORMAL
PROT UR QL STRIP: NEGATIVE MG/DL
PROT UR QL STRIP: NORMAL MG/DL
RBC UR QL AUTO: NEGATIVE
RBC UR QL AUTO: NORMAL
SP GR UR STRIP.AUTO: 1.01
SP GR UR STRIP.AUTO: 1.02
UROBILINOGEN UR STRIP-MCNC: 1 MG/DL
UROBILINOGEN UR STRIP.AUTO-MCNC: 1 EU/DL

## 2025-01-24 PROCEDURE — 51798 US URINE CAPACITY MEASURE: CPT | Performed by: PHYSICIAN ASSISTANT

## 2025-01-24 PROCEDURE — 99214 OFFICE O/P EST MOD 30 MIN: CPT | Performed by: PHYSICIAN ASSISTANT

## 2025-01-24 PROCEDURE — 81002 URINALYSIS NONAUTO W/O SCOPE: CPT | Performed by: PHYSICIAN ASSISTANT

## 2025-01-24 PROCEDURE — 81003 URINALYSIS AUTO W/O SCOPE: CPT

## 2025-01-24 NOTE — PROGRESS NOTES
Reason for visit:   Bilateral scrotal pain  Inflammatory prostatitis    HPI   54 y.o. male presenting for review of chronic bilateral scrotal pain and inflammatory prostatitis. The patient was last seen in the office on 9/5/24 and I recommended that he continue with alfuzosin 10 mg daily and cialis 5 mg daily.    The patient reports that since I have last seen him, he is doing very well. He reports that he has continued with the alfuzosin and cialis, and additionally very consciously increased his water intake. He does notice occasional scrotal discomfort, but it is much better than it was before.    He reports that today, he did start to have some dysuria. He is unsure if it is secondary to the anxiety of this appointment today. He has had burning with urination all day but denies any blood in the urine. Additionally, he feels that over the last couple days he has developed some RIGHT sided flank pain. This is new and has never happened before. He is concerned that he may have a kidney stone.     He denies any fevers/chills and general feelings of being unwell.    PVR: 57 mL     9/5/24 OV:  HPI   54 y.o. male presenting for review of chronic bilateral scrotal pain and inflammatory prostatitis. The patient was last seen in the office on 7/3/24 and I recommended a trial of alfuzosin in addition to tadalafil 5 mg daily.     Overall the patient does feel that urinary symptoms are improving. He reports that OAB symptoms are much better. He does still experience occasional scrotal soreness. He equates this discomfort to the ache that is felt after being kicked in the scrotum. This is not severe in nature and does resolve on its own. He is not elevating scrotum or using scrotal support.      The patient states that he is improving his daily water intake. He is also working on making improvements with his diet. He reports that he is very motivated.     The patient denies any bothersome side effects associated with  alfuzosin.      He denies any gross hematuria or dysuria.      6/15/24 Scrotal US: Normal, incidental findings of bilateral epididymal head cysts.     PVR: 10 mL     7/3/24 OV:  HPI   54 y.o. male presenting for review of BPH with OAB and complaints of bilateral scrotal pain. The patient was last seen in the office on 5/14/24 by Brigitte Hou NP who recommended a scrotal US. He also continues on tadalafil 5 mg daily and oxybutinin ER 10 mg daily for BPH with OAB symptoms.     The patient still complains of bilateral scrotal pain that is intermittent but at least occurs daily. Sometimes the discomfort radiates to his SP region. He is also bothered by his urinary pattern. He describes burning in his penis, urinary frequency and urinary urgency. He has been diligent about good daily water intake and admits to taking an OTC herbal supplement for prostate health. His wife is very concerned about symptoms.      He is tolerating tadalafil 5 mg daily and does feel that it has slightly improved urinary symptoms. He never started the oxybutinin and would like to stay off of it.      He denies any history of recurrent UTIs, visible hematuria and is not concerned for STIs.      PVR: 0 mL     5/14/24 OV with Brigitte Hou NP:  Subjective  Devin Montgomery is a 54 y.o. male who presents today for follow  up of BPH with lower urinary tract symptoms.  Patient was seen in April 2024 and was already on Flomax and discontinued it because he reported that he was having right-sided pain radiating to his flank area.       At last visit we started him on tadalafil 5 mg p.o. daily for his bothersome lower urinary tract symptoms such as dysuria, hesitancy, difficulty starting stream, dribbling and urgency without leakage.  He reports improvement in his bothersome lower urinary tract symptoms.  He reports that his nocturia has improved and he is only getting up 1 time per night to urinate, his hesitancy is also improved, along  with his dysuria.     With Oxybutynin 10mg, his urgency and nocturia has improved and he has just started this medication times a few days ago. He has regular BM daily     He was having bilateral testicular pain.  I ordered a ultrasound of his testicles which has not been completed at this time. He reports that he does have some intermittent burning at the base of his penis and throughout his penis, for this we discussed moving forward with getting the ultrasound    Urologic PMH:    Chronic scrotal pain     There were no vitals filed for this visit.      Physical Exam:   General:  Alert & Oriented, NAD   Thorax: No CVA tenderness   Abdomen:  Soft, nontender to superficial/deep palpation, nondistended    Assessment and plan:     Bilateral scrotal pain - stable     -- Scrotal support and elevation reviewed  -- NSAIDs for discomfort PRN  -- Tub soaks  -- We reviewed again that scrotal pain can sometimes be related to lower back pain and/or prostatitis symptoms and that conservative care my help alleviate his occasional discomfort     Chronic inflammatory prostatitis     -- PVR reviewed and patient is emptying his bladder well   -- POCT urinalysis reviewed and WNL  -- Continue alfuzosin 10 mg daily -- will consider a trial off of the medication at next visit  -- Discontinue tadalafil 5 mg daily  -- Encouraged hydration with at least 48-64 ounces of water per day   -- Suggested warm tub soaks and NSAID's for discomfort as needed   -- Counseled him on food triggers and instructed him to avoid ETOH particularly beer, spicy foods and caffeine   -- Obtain a baseline UT imaging for review given new RIGHT flank pain -- US kidneys and bladder ordered  -- Obtain annual PSA test for PSA screening -- ordered  -- Follow-up in 3 months for symptom review and consideration of a trial off of tamsulosin    I personally reviewed the patient's pertinent medical records and labs/images available to me.     My total time spent caring for  the patient on the day of the encounter was 35 minutes.   This does not include time spent on separately billable procedures/tests.    Byron Dickerson PA-C

## 2025-02-18 ENCOUNTER — OFFICE VISIT (OUTPATIENT)
Dept: MEDICAL GROUP | Facility: PHYSICIAN GROUP | Age: 55
End: 2025-02-18
Payer: COMMERCIAL

## 2025-02-18 VITALS
OXYGEN SATURATION: 98 % | DIASTOLIC BLOOD PRESSURE: 80 MMHG | SYSTOLIC BLOOD PRESSURE: 118 MMHG | HEART RATE: 78 BPM | BODY MASS INDEX: 39.37 KG/M2 | TEMPERATURE: 96.7 F | HEIGHT: 66 IN | WEIGHT: 245 LBS

## 2025-02-18 DIAGNOSIS — H65.191 ACUTE EFFUSION OF RIGHT EAR: ICD-10-CM

## 2025-02-18 DIAGNOSIS — J22 LOWER RESPIRATORY TRACT INFECTION: ICD-10-CM

## 2025-02-18 PROCEDURE — 99213 OFFICE O/P EST LOW 20 MIN: CPT | Performed by: NURSE PRACTITIONER

## 2025-02-18 PROCEDURE — 3079F DIAST BP 80-89 MM HG: CPT | Performed by: NURSE PRACTITIONER

## 2025-02-18 PROCEDURE — 3074F SYST BP LT 130 MM HG: CPT | Performed by: NURSE PRACTITIONER

## 2025-02-18 RX ORDER — AZITHROMYCIN 250 MG/1
TABLET, FILM COATED ORAL
Qty: 6 TABLET | Refills: 0 | Status: SHIPPED | OUTPATIENT
Start: 2025-02-18

## 2025-02-18 RX ORDER — FLUTICASONE PROPIONATE 50 MCG
1 SPRAY, SUSPENSION (ML) NASAL DAILY
Qty: 16 G | Refills: 0 | Status: SHIPPED | OUTPATIENT
Start: 2025-02-18

## 2025-02-18 ASSESSMENT — FIBROSIS 4 INDEX: FIB4 SCORE: 0.66

## 2025-02-18 NOTE — LETTER
February 18, 2025    To Whom It May Concern:         This is confirmation that Devin Montgomery attended his scheduled appointment with JEF Calvo on 2/18/25.         If you have any questions please do not hesitate to call me at the phone number listed below.    Sincerely,          VI Calvo.  [phone]

## 2025-02-19 NOTE — PROGRESS NOTES
Subjective:     CC: Congestion     HPI:   Devin is an established patient of Dr. Moreland who presents today with the following:      The chest congestion, sinus congestion, ear pressure when blowing nose, sneezing, watery eyes, intermittent cough, shortness of breath, wheezing and green mucus started 3 weeks ago. Interventions tried include lime/ginger tea with honey, Advil. No home COVID testing. History of allergic rhinitis. Influenza vaccine is up-to-date. Denies fever, chills, sore throat, ear pain, sinus pain, body aches, sick contacts, loss of taste, loss of smell, itchy eyes, chest pain.      No past medical history on file.    Social History     Tobacco Use    Smoking status: Former     Current packs/day: 0.00     Average packs/day: 0.5 packs/day for 19.0 years (9.5 ttl pk-yrs)     Types: Cigarettes     Start date:      Quit date:      Years since quittin.1    Smokeless tobacco: Never   Vaping Use    Vaping status: Never Used   Substance Use Topics    Alcohol use: Not Currently    Drug use: No       Current Outpatient Medications Ordered in Epic   Medication Sig Dispense Refill    azithromycin (ZITHROMAX) 250 MG Tab On Day 1 take two tablets (500 mg) by mouth. On Day 2-5 take one tablet by mouth (250 mg) daily. 6 Tablet 0    fluticasone (FLONASE) 50 MCG/ACT nasal spray Administer 1 Spray into affected nostril(S) every day. 16 g 0    fexofenadine (ALLEGRA ALLERGY) 180 MG tablet Take 180 mg by mouth every day.      alfuzosin (UROXATRAL) 10 MG SR tablet Take 1 Tablet by mouth every day. (Patient not taking: Reported on 2025) 30 Tablet 2    albuterol 108 (90 Base) MCG/ACT Aero Soln inhalation aerosol Inhale 1-2 Puffs every four hours as needed for Shortness of Breath. 1 Each 0     No current [facility]-ordered facility-administered medications on file.       Allergies:  Seasonal    Health Maintenance: Reviewed       Objective:     Vital signs reviewed  Exam:  /80 (BP Location: Left arm, Patient  "Position: Sitting, BP Cuff Size: Adult)   Pulse 78   Temp 35.9 °C (96.7 °F) (Temporal)   Ht 1.676 m (5' 6\")   Wt 111 kg (245 lb)   SpO2 98%   BMI 39.54 kg/m²  Body mass index is 39.54 kg/m².      General: Normal appearing. No distress.  HENT: Normocephalic. Ears normal shape and contour, canals are clear bilaterally, left TM pearly gray, right TM with small effusion, nasal turbinates with erythema and swelling, oropharynx is with cobblestoning and erythema, no edema or exudates.  No pain to frontal maxillary sinuses.  Eyes: Eyes conjunctiva clear lids without ptosis, pupils equal and reactive to light accommodation, lids normal.  Pulmonary: Rhonchi throughout.  Normal effort. No rales or wheezing.  Cardiovascular: Regular rate and rhythm without murmur.   Lymph: No cervical or supraclavicular lymph nodes are palpable.  Skin: Warm and dry.  No obvious lesions.  Psych: Normal mood and affect. Alert and oriented x3. Judgment and insight is normal.      Assessment & Plan:     54 y.o. male with the following -     1. Lower respiratory tract infection  Acute uncomplicated problem.  Given length of his symptoms and assessment will treat for lower respiratory tract infection.  Start azithromycin for 5-day course.  Stay hydrated.  Follow-up with symptoms worsen.  Work note provided today.  - azithromycin (ZITHROMAX) 250 MG Tab; On Day 1 take two tablets (500 mg) by mouth. On Day 2-5 take one tablet by mouth (250 mg) daily.  Dispense: 6 Tablet; Refill: 0    2. Acute effusion of right ear  Acute uncomplicated problem.  Recommend he use Flonase nasal spray.  - fluticasone (FLONASE) 50 MCG/ACT nasal spray; Administer 1 Spray into affected nostril(S) every day.  Dispense: 16 g; Refill: 0      Return if symptoms worsen or fail to improve.    Please note that this dictation was created using voice recognition software. I have made every reasonable attempt to correct obvious errors, but I expect that there are errors of " grammar and possibly content that I did not discover before finalizing the note.

## 2025-03-07 ENCOUNTER — HOSPITAL ENCOUNTER (OUTPATIENT)
Dept: RADIOLOGY | Facility: MEDICAL CENTER | Age: 55
End: 2025-03-07
Attending: PHYSICIAN ASSISTANT
Payer: COMMERCIAL

## 2025-03-07 DIAGNOSIS — N40.1 BENIGN PROSTATIC HYPERPLASIA WITH URINARY FREQUENCY: ICD-10-CM

## 2025-03-07 DIAGNOSIS — R35.0 BENIGN PROSTATIC HYPERPLASIA WITH URINARY FREQUENCY: ICD-10-CM

## 2025-03-07 PROCEDURE — 76775 US EXAM ABDO BACK WALL LIM: CPT

## 2025-03-10 ENCOUNTER — RESULTS FOLLOW-UP (OUTPATIENT)
Dept: UROLOGY | Facility: MEDICAL CENTER | Age: 55
End: 2025-03-10
Payer: COMMERCIAL

## 2025-03-21 ENCOUNTER — HOSPITAL ENCOUNTER (OUTPATIENT)
Dept: LAB | Facility: MEDICAL CENTER | Age: 55
End: 2025-03-21
Attending: FAMILY MEDICINE
Payer: COMMERCIAL

## 2025-03-21 DIAGNOSIS — Z00.00 PE (PHYSICAL EXAM), ANNUAL: ICD-10-CM

## 2025-03-21 DIAGNOSIS — Z11.4 ENCOUNTER FOR SCREENING FOR HIV: ICD-10-CM

## 2025-03-21 LAB
ALBUMIN SERPL BCP-MCNC: 4 G/DL (ref 3.2–4.9)
ALBUMIN/GLOB SERPL: 1.4 G/DL
ALP SERPL-CCNC: 70 U/L (ref 30–99)
ALT SERPL-CCNC: 25 U/L (ref 2–50)
ANION GAP SERPL CALC-SCNC: 11 MMOL/L (ref 7–16)
AST SERPL-CCNC: 21 U/L (ref 12–45)
BASOPHILS # BLD AUTO: 0.8 % (ref 0–1.8)
BASOPHILS # BLD: 0.06 K/UL (ref 0–0.12)
BILIRUB SERPL-MCNC: 0.6 MG/DL (ref 0.1–1.5)
BUN SERPL-MCNC: 15 MG/DL (ref 8–22)
CALCIUM ALBUM COR SERPL-MCNC: 9.4 MG/DL (ref 8.5–10.5)
CALCIUM SERPL-MCNC: 9.4 MG/DL (ref 8.5–10.5)
CHLORIDE SERPL-SCNC: 106 MMOL/L (ref 96–112)
CHOLEST SERPL-MCNC: 194 MG/DL (ref 100–199)
CO2 SERPL-SCNC: 23 MMOL/L (ref 20–33)
CREAT SERPL-MCNC: 0.78 MG/DL (ref 0.5–1.4)
EOSINOPHIL # BLD AUTO: 0.29 K/UL (ref 0–0.51)
EOSINOPHIL NFR BLD: 3.8 % (ref 0–6.9)
ERYTHROCYTE [DISTWIDTH] IN BLOOD BY AUTOMATED COUNT: 45.1 FL (ref 35.9–50)
EST. AVERAGE GLUCOSE BLD GHB EST-MCNC: 114 MG/DL
GFR SERPLBLD CREATININE-BSD FMLA CKD-EPI: 105 ML/MIN/1.73 M 2
GLOBULIN SER CALC-MCNC: 2.8 G/DL (ref 1.9–3.5)
GLUCOSE SERPL-MCNC: 86 MG/DL (ref 65–99)
HBA1C MFR BLD: 5.6 % (ref 4–5.6)
HCT VFR BLD AUTO: 45 % (ref 42–52)
HDLC SERPL-MCNC: 53 MG/DL
HGB BLD-MCNC: 15.1 G/DL (ref 14–18)
HIV 1+2 AB+HIV1 P24 AG SERPL QL IA: NORMAL
IMM GRANULOCYTES # BLD AUTO: 0.04 K/UL (ref 0–0.11)
IMM GRANULOCYTES NFR BLD AUTO: 0.5 % (ref 0–0.9)
LDLC SERPL CALC-MCNC: 112 MG/DL
LYMPHOCYTES # BLD AUTO: 3 K/UL (ref 1–4.8)
LYMPHOCYTES NFR BLD: 39.2 % (ref 22–41)
MCH RBC QN AUTO: 32.1 PG (ref 27–33)
MCHC RBC AUTO-ENTMCNC: 33.6 G/DL (ref 32.3–36.5)
MCV RBC AUTO: 95.7 FL (ref 81.4–97.8)
MONOCYTES # BLD AUTO: 0.72 K/UL (ref 0–0.85)
MONOCYTES NFR BLD AUTO: 9.4 % (ref 0–13.4)
NEUTROPHILS # BLD AUTO: 3.54 K/UL (ref 1.82–7.42)
NEUTROPHILS NFR BLD: 46.3 % (ref 44–72)
NRBC # BLD AUTO: 0 K/UL
NRBC BLD-RTO: 0 /100 WBC (ref 0–0.2)
PLATELET # BLD AUTO: 308 K/UL (ref 164–446)
PMV BLD AUTO: 10 FL (ref 9–12.9)
POTASSIUM SERPL-SCNC: 4.4 MMOL/L (ref 3.6–5.5)
PROT SERPL-MCNC: 6.8 G/DL (ref 6–8.2)
RBC # BLD AUTO: 4.7 M/UL (ref 4.7–6.1)
SODIUM SERPL-SCNC: 140 MMOL/L (ref 135–145)
TRIGL SERPL-MCNC: 147 MG/DL (ref 0–149)
WBC # BLD AUTO: 7.7 K/UL (ref 4.8–10.8)

## 2025-03-21 PROCEDURE — 87389 HIV-1 AG W/HIV-1&-2 AB AG IA: CPT

## 2025-03-21 PROCEDURE — 80061 LIPID PANEL: CPT

## 2025-03-21 PROCEDURE — 36415 COLL VENOUS BLD VENIPUNCTURE: CPT

## 2025-03-21 PROCEDURE — 85025 COMPLETE CBC W/AUTO DIFF WBC: CPT

## 2025-03-21 PROCEDURE — 80053 COMPREHEN METABOLIC PANEL: CPT

## 2025-03-21 PROCEDURE — 83036 HEMOGLOBIN GLYCOSYLATED A1C: CPT

## 2025-04-04 ENCOUNTER — OFFICE VISIT (OUTPATIENT)
Dept: MEDICAL GROUP | Facility: MEDICAL CENTER | Age: 55
End: 2025-04-04
Payer: COMMERCIAL

## 2025-04-04 VITALS
DIASTOLIC BLOOD PRESSURE: 68 MMHG | SYSTOLIC BLOOD PRESSURE: 116 MMHG | OXYGEN SATURATION: 98 % | WEIGHT: 240.3 LBS | TEMPERATURE: 96.9 F | HEIGHT: 66 IN | BODY MASS INDEX: 38.62 KG/M2 | HEART RATE: 77 BPM

## 2025-04-04 DIAGNOSIS — Z00.00 PE (PHYSICAL EXAM), ANNUAL: Primary | ICD-10-CM

## 2025-04-04 DIAGNOSIS — E78.00 PURE HYPERCHOLESTEROLEMIA: ICD-10-CM

## 2025-04-04 DIAGNOSIS — H65.93 FLUID LEVEL BEHIND TYMPANIC MEMBRANE OF BOTH EARS: ICD-10-CM

## 2025-04-04 DIAGNOSIS — M54.50 CHRONIC RIGHT-SIDED LOW BACK PAIN WITHOUT SCIATICA: ICD-10-CM

## 2025-04-04 DIAGNOSIS — E55.9 VITAMIN D INSUFFICIENCY: ICD-10-CM

## 2025-04-04 DIAGNOSIS — G56.23 ULNAR NEUROPATHY OF BOTH UPPER EXTREMITIES: ICD-10-CM

## 2025-04-04 DIAGNOSIS — Z12.11 SCREENING FOR COLORECTAL CANCER: ICD-10-CM

## 2025-04-04 DIAGNOSIS — G89.29 CHRONIC RIGHT-SIDED LOW BACK PAIN WITHOUT SCIATICA: ICD-10-CM

## 2025-04-04 DIAGNOSIS — Z90.3 HISTORY OF SLEEVE GASTRECTOMY: ICD-10-CM

## 2025-04-04 DIAGNOSIS — R35.0 BENIGN PROSTATIC HYPERPLASIA WITH URINARY FREQUENCY: ICD-10-CM

## 2025-04-04 DIAGNOSIS — E66.9 OBESITY (BMI 30-39.9): ICD-10-CM

## 2025-04-04 DIAGNOSIS — Z23 NEED FOR VACCINATION: ICD-10-CM

## 2025-04-04 DIAGNOSIS — Z12.12 SCREENING FOR COLORECTAL CANCER: ICD-10-CM

## 2025-04-04 DIAGNOSIS — J30.89 NON-SEASONAL ALLERGIC RHINITIS, UNSPECIFIED TRIGGER: ICD-10-CM

## 2025-04-04 DIAGNOSIS — N40.1 BENIGN PROSTATIC HYPERPLASIA WITH URINARY FREQUENCY: ICD-10-CM

## 2025-04-04 PROBLEM — N50.812 PAIN IN BOTH TESTICLES: Status: RESOLVED | Noted: 2024-04-16 | Resolved: 2025-04-04

## 2025-04-04 PROBLEM — N50.811 PAIN IN BOTH TESTICLES: Status: RESOLVED | Noted: 2024-04-16 | Resolved: 2025-04-04

## 2025-04-04 PROCEDURE — 90471 IMMUNIZATION ADMIN: CPT | Performed by: FAMILY MEDICINE

## 2025-04-04 PROCEDURE — 99396 PREV VISIT EST AGE 40-64: CPT | Mod: 25 | Performed by: FAMILY MEDICINE

## 2025-04-04 PROCEDURE — 99214 OFFICE O/P EST MOD 30 MIN: CPT | Mod: 25 | Performed by: FAMILY MEDICINE

## 2025-04-04 PROCEDURE — 90677 PCV20 VACCINE IM: CPT | Performed by: FAMILY MEDICINE

## 2025-04-04 PROCEDURE — 90746 HEPB VACCINE 3 DOSE ADULT IM: CPT | Performed by: FAMILY MEDICINE

## 2025-04-04 PROCEDURE — 90472 IMMUNIZATION ADMIN EACH ADD: CPT | Performed by: FAMILY MEDICINE

## 2025-04-04 RX ORDER — MELOXICAM 15 MG/1
15 TABLET ORAL DAILY
Qty: 30 TABLET | Refills: 0 | Status: SHIPPED | OUTPATIENT
Start: 2025-04-04

## 2025-04-04 RX ORDER — METHYLPREDNISOLONE 4 MG/1
TABLET ORAL
Qty: 21 TABLET | Refills: 0 | Status: SHIPPED | OUTPATIENT
Start: 2025-04-04

## 2025-04-04 ASSESSMENT — PATIENT HEALTH QUESTIONNAIRE - PHQ9: CLINICAL INTERPRETATION OF PHQ2 SCORE: 0

## 2025-04-04 ASSESSMENT — FIBROSIS 4 INDEX: FIB4 SCORE: .7636363636363636364

## 2025-04-06 PROBLEM — G56.23 ULNAR NEUROPATHY OF BOTH UPPER EXTREMITIES: Status: ACTIVE | Noted: 2025-04-06

## 2025-04-06 PROBLEM — G89.29 CHRONIC RIGHT-SIDED LOW BACK PAIN WITHOUT SCIATICA: Status: ACTIVE | Noted: 2025-04-06

## 2025-04-06 PROBLEM — M54.50 CHRONIC RIGHT-SIDED LOW BACK PAIN WITHOUT SCIATICA: Status: ACTIVE | Noted: 2025-04-06

## 2025-04-06 NOTE — PROGRESS NOTES
Verbal consent was acquired by the patient to use [employer] ambient listening note generation during this visit: YES    CC: annual PE, ulnar neuropathy, back pain     Assessment & Plan:     1. Need for vaccination  - Hepatitis B Vaccine Adult 20+  - Pneumococcal Conjugate Vaccine 20-Valent (6 wks+)    2. Screening for colorectal cancer  - Cologuard® colon cancer screening    3. Benign prostatic hyperplasia with urinary frequency  Chronic, mild, stopped Alfazosin.   Symptoms are controlled.   Will continue to monitor.     4. History of sleeve gastrectomy  5. Obesity (BMI 30-39.9)  Body mass index is 38.79 kg/m².   Able to maintain most of the weight loss post surgery.   Denies further treatment for weight loss.   He is active and tries to exercise regularly.   - Patient identified as having weight management issue.  Appropriate orders and counseling given.    6. PE (physical exam), annual (Primary)  Labs per orders  Immunization reviewed and discussed.  Patient was counseled about  diet, supplements, exercises.   Preventive cares reviewed, discussed, and updated as appropriate.     - CBC WITH DIFFERENTIAL; Future  - Comp Metabolic Panel; Future  - HEMOGLOBIN A1C; Future  - Lipid Profile; Future    7. Fluid level behind tympanic membrane of both ears  History and exam are concerning for middle ear effusion.   - methylPREDNISolone (MEDROL DOSEPAK) 4 MG Tablet Therapy Pack; 6 tabs day 1, 5 tabs day 2, 4 tabs day 3, 3 tabs day 4, 2 tabs day 5, 1 tab day 6. Take with food.  Dispense: 21 Tablet; Refill: 0  - treat allergic rhinitis as below    8. Vitamin D insufficiency  - continue over-the-counter vitamin D supplement  - VITAMIN D,25 HYDROXY (DEFICIENCY); Future    9. Pure hypercholesterolemia  Recent labs showed mild hyperlipidemia.   The 10-year ASCVD risk score (Piedad ACOSTA, et al., 2019) is: 4.8%    Values used to calculate the score:      Age: 55 years      Sex: Male      Is Non- : No       Diabetic: No      Tobacco smoker: No      Systolic Blood Pressure: 116 mmHg      Is BP treated: No      HDL Cholesterol: 53 mg/dL      Total Cholesterol: 194 mg/dL   Pharmacotherapy not indicated.   - dietary modification, regular exercise  - weight loss   - avoid alcohol, illicit drugs, tobacco products     10. Non-seasonal allergic rhinitis, unspecified trigger  History and exam are concerning for allergic rhinitis. Symptoms appear to be worse due to seasonal changes. Patient is not receiving treatment.   - Nasal saline irrigation 2-3 times per day to reduce allergen load  - Over-the-counter nasal steroid (Flonase, NasoNex, or Nasacort)   - Over-the-counter oral anti-histamine PRN (Claritin, Allegra, or Zyrtec)     11. Chronic right-sided low back pain without sciatica  History and exam are concerning for chronic right-sided lumbar strain, likely related to his occupation .  - meloxicam (MOBIC) 15 MG tablet; Take 1 Tablet by mouth every day. With foods  Dispense: 30 Tablet; Refill: 0  - rehab exercises, handout provided.   - weight loss  - icing, rest, activity modification     12. Ulnar neuropathy of both upper extremities  History and exam are concerning for bilateral ulnar neuropathy, likely secondary to prolonged elbow flexion at night lead to increased stress on ulnar nerve.   - behavioral modification   - rehab exercises, handout provided.   - Meloxicam 15 mg qd PRN   - follow up PRN     Subjective:       HPI:     History of Present Illness  The patient is a 55-year-old male who presents for evaluation of elevated cholesterol, seasonal allergies, numbness in his fingers, and back pain.    He reports experiencing significant phlegm accumulation in his chest and lungs, which has been partially alleviated by the consumption of certain teas. He also notes excessive nasal drainage, necessitating frequent voice clearing upon awakening. Intermittent ear discomfort is reported, with the most recent episode occurring  last night. He occasionally experiences watery and itchy eyes. He has been prescribed albuterol for his allergies, which he finds beneficial. He has a history of year-round allergies but is not currently receiving treatment.    He describes a sensation of numbness in his hands in the morning, particularly in the 4th & 5th fingers. This numbness sometimes extends to his entire hand upon awakening, which he attempts to alleviate by shaking his hands. He is right-handed. He works as a  and endorses prolonged activities above the right shoulder level. He sleeps on his belly with both elbow flexed to wrap his arms around his pillows. He has a history of dislocating his arm following a fall from a ladder.    He has been experiencing severe right-sided lower back pain for the past 2 to 3 months, which he attributes to a potential pinched nerve. Pain localizes to the right flank, non-radiating, persistent for a few hours or a few days, then resolve w/o intervention. Denies lower extremities weakness, bowel/bladder symptoms. He finds the pain irritating and has sought relief through massage from his wife, who has identified a knot in the area.  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 Recent kidney ultrasound was unremarkable.     He has discontinued alfuzosin and reports no significant changes in his condition.     He is due for colon cancer screening and has expressed a preference for a stool test over a colonoscopy.     SOCIAL HISTORY  He does not drink alcohol.          Current Outpatient Medications:     methylPREDNISolone (MEDROL DOSEPAK) 4 MG Tablet Therapy Pack, 6 tabs day 1, 5 tabs day 2, 4 tabs day 3, 3 tabs day 4, 2 tabs day 5, 1 tab day 6. Take with food., Disp: 21 Tablet, Rfl: 0    meloxicam (MOBIC) 15 MG tablet, Take 1 Tablet by mouth every day. With foods, Disp: 30 Tablet, Rfl: 0    fluticasone (FLONASE) 50 MCG/ACT nasal spray, Administer 1 Spray into affected nostril(S)  "every day., Disp: 16 g, Rfl: 0    fexofenadine (ALLEGRA ALLERGY) 180 MG tablet, Take 180 mg by mouth every day., Disp: , Rfl:     albuterol 108 (90 Base) MCG/ACT Aero Soln inhalation aerosol, Inhale 1-2 Puffs every four hours as needed for Shortness of Breath., Disp: 1 Each, Rfl: 0     Objective:     Exam:  /68 (BP Location: Right arm, Patient Position: Sitting, BP Cuff Size: Adult long)   Pulse 77   Temp 36.1 °C (96.9 °F) (Temporal)   Ht 1.676 m (5' 6\")   Wt 109 kg (240 lb 4.8 oz)   SpO2 98%   BMI 38.79 kg/m²  Body mass index is 38.79 kg/m².    Constitutional: awake, alert, in no distress.  Skin: Warm, dry, good turgor, no rashes, bruises, ulcers in visible areas.  Eye: conjunctiva clear, lids neg for edema or lesions.  ENMT: mild-moderate middle ear effusion bilaterally. Pale and congested nasal mucosa. Lips without lesions, good dentition, pale posterior oropharynx, positive post nasal drips.  Neck: Trachea midline, no masses, no thyromegaly. No cervical or supraclavicular lymphadenopathy  Respiratory: Unlabored respiratory effort, lungs clear to auscultation, no wheezes, no rales.  Cardiovascular: Normal S1, S2, no murmur, no pedal edema.  Psych: Oriented x3, affect and mood wnl, intact judgement and insight.   Physical Exam  Musculoskeletal:      Right elbow: Normal. No swelling, deformity, effusion or lacerations. Normal range of motion. No tenderness.      Left elbow: Normal. No swelling, deformity, effusion or lacerations. Normal range of motion. No tenderness.      Lumbar back: Spasms present. No swelling, edema, deformity, signs of trauma, lacerations, tenderness or bony tenderness. Decreased range of motion. Negative right straight leg raise test and negative left straight leg raise test. No scoliosis.       Return in about 1 year (around 4/4/2026) for Multiple issues, As needed.          Please note that this dictation was created using voice recognition software. I have made every reasonable " attempt to correct obvious errors, but I expect that there are errors of grammar and possibly content that I did not discover before finalizing the note.

## 2025-05-13 ENCOUNTER — OFFICE VISIT (OUTPATIENT)
Dept: MEDICAL GROUP | Facility: PHYSICIAN GROUP | Age: 55
End: 2025-05-13
Payer: COMMERCIAL

## 2025-05-13 VITALS
DIASTOLIC BLOOD PRESSURE: 68 MMHG | HEART RATE: 77 BPM | TEMPERATURE: 98.1 F | BODY MASS INDEX: 37.54 KG/M2 | OXYGEN SATURATION: 97 % | HEIGHT: 67 IN | WEIGHT: 239.2 LBS | SYSTOLIC BLOOD PRESSURE: 108 MMHG

## 2025-05-13 DIAGNOSIS — J30.9 ALLERGIC RHINITIS, UNSPECIFIED SEASONALITY, UNSPECIFIED TRIGGER: ICD-10-CM

## 2025-05-13 DIAGNOSIS — R09.82 POST-NASAL DRIP: ICD-10-CM

## 2025-05-13 PROCEDURE — 3074F SYST BP LT 130 MM HG: CPT | Performed by: NURSE PRACTITIONER

## 2025-05-13 PROCEDURE — 99213 OFFICE O/P EST LOW 20 MIN: CPT | Performed by: NURSE PRACTITIONER

## 2025-05-13 PROCEDURE — 3078F DIAST BP <80 MM HG: CPT | Performed by: NURSE PRACTITIONER

## 2025-05-13 RX ORDER — CETIRIZINE HYDROCHLORIDE 10 MG/1
10 TABLET ORAL DAILY
COMMUNITY

## 2025-05-13 ASSESSMENT — FIBROSIS 4 INDEX: FIB4 SCORE: .7636363636363636364

## 2025-05-13 NOTE — PROGRESS NOTES
Subjective:     CC: Dizziness    HPI:   Devin is an established patient of Dr. Saini who presents today with his wife for the following:    For the last 3 days he has had intermittent dizziness. Aggravated by standing quickly. 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 He has also had mucous, tickle in throat, sinus pressure, itchy eyes, headache and productive cough. He has taken Zyrtec.  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 Dizziness can feel like spinning. Denies fever, chills, sore throat, ear pain, sinus pain, sick contacts, loss of taste, loss of smell, watery eyes, chest pain or shortness of breath.      History reviewed. No pertinent past medical history.    Social History     Tobacco Use    Smoking status: Former     Current packs/day: 0.00     Average packs/day: 0.5 packs/day for 19.0 years (9.5 ttl pk-yrs)     Types: Cigarettes     Start date:      Quit date:      Years since quittin.3    Smokeless tobacco: Never   Vaping Use    Vaping status: Never Used   Substance Use Topics    Alcohol use: Not Currently    Drug use: No       Current Outpatient Medications Ordered in Epic   Medication Sig Dispense Refill    cetirizine (ZYRTEC) 10 MG Tab Take 10 mg by mouth every day.      fluticasone (FLONASE) 50 MCG/ACT nasal spray Administer 1 Spray into affected nostril(S) every day. 16 g 0    albuterol 108 (90 Base) MCG/ACT Aero Soln inhalation aerosol Inhale 1-2 Puffs every four hours as needed for Shortness of Breath. 1 Each 0    methylPREDNISolone (MEDROL DOSEPAK) 4 MG Tablet Therapy Pack 6 tabs day 1, 5 tabs day 2, 4 tabs day 3, 3 tabs day 4, 2 tabs day 5, 1 tab day 6. Take with food. (Patient not taking: Reported on 2025) 21 Tablet 0    meloxicam (MOBIC) 15 MG tablet Take 1 Tablet by mouth every day. With foods (Patient not taking: Reported on 2025) 30 Tablet 0     No current Epic-ordered facility-administered medications on file.  "      Allergies:  Seasonal    Health Maintenance: Reviewed       Objective:     Vital signs reviewed  Exam:  /68 (BP Location: Left arm, Patient Position: Sitting, BP Cuff Size: Adult)   Pulse 77   Temp 36.7 °C (98.1 °F) (Temporal)   Ht 1.702 m (5' 7\")   Wt 109 kg (239 lb 3.2 oz)   SpO2 97%   BMI 37.46 kg/m²  Body mass index is 37.46 kg/m².    General: Normal appearing. No distress.  HENT: Normocephalic. Ears normal shape and contour, canals are clear bilaterally, right TM pearly gray, left TM with injection, nasal turbinates dry, oropharynx is with cobblestoning and erythema, no edema or exudates. No pain to sinuses on palpation.   Eyes: Eyes conjunctiva clear lids without ptosis, pupils equal and reactive to light accommodation, lids normal.  Pulmonary: Clear to ausculation.  Normal effort. No rales, rhonchi, or wheezing.  Cardiovascular: Regular rate and rhythm without murmur.   Lymph: No cervical or supraclavicular lymph nodes are palpable.  Skin: Warm and dry.  No obvious lesions.  Psych: Normal mood and affect. Alert and oriented x3. Judgment and insight is normal.      Assessment & Plan:     55 y.o. male with the following -     1. Allergic rhinitis, unspecified seasonality, unspecified trigger  Chronic exacerbated problem. Continue zyrtec 10 mg daily and start Flonase nasal spray. Work note provided today.     2. Post-nasal drip  Acute uncomplicated problem. Start Flonase nasal spray.     Return if symptoms worsen or fail to improve.    Please note that this dictation was created using voice recognition software. I have made every reasonable attempt to correct obvious errors, but I expect that there are errors of grammar and possibly content that I did not discover before finalizing the note.        "

## 2025-05-13 NOTE — LETTER
May 13, 2025    To Whom It May Concern:         This is confirmation that Devin Montgomery attended his scheduled appointment with JEF Calvo on 5/13/25.         If you have any questions please do not hesitate to call me at the phone number listed below.    Sincerely,          VI Calvo.  [phone]

## 2025-07-09 ENCOUNTER — RESULTS FOLLOW-UP (OUTPATIENT)
Dept: MEDICAL GROUP | Facility: MEDICAL CENTER | Age: 55
End: 2025-07-09
Payer: COMMERCIAL

## 2025-07-09 LAB — NONINV COLON CA DNA+OCC BLD SCRN STL QL: NEGATIVE

## 2025-07-23 ENCOUNTER — OFFICE VISIT (OUTPATIENT)
Dept: MEDICAL GROUP | Facility: PHYSICIAN GROUP | Age: 55
End: 2025-07-23
Payer: COMMERCIAL

## 2025-07-23 VITALS
TEMPERATURE: 97.2 F | SYSTOLIC BLOOD PRESSURE: 122 MMHG | HEIGHT: 66 IN | BODY MASS INDEX: 38.25 KG/M2 | WEIGHT: 238 LBS | OXYGEN SATURATION: 97 % | DIASTOLIC BLOOD PRESSURE: 80 MMHG | HEART RATE: 75 BPM

## 2025-07-23 DIAGNOSIS — S96.912A STRAIN OF LEFT ANKLE, INITIAL ENCOUNTER: Primary | ICD-10-CM

## 2025-07-23 PROCEDURE — 3079F DIAST BP 80-89 MM HG: CPT | Performed by: NURSE PRACTITIONER

## 2025-07-23 PROCEDURE — 99213 OFFICE O/P EST LOW 20 MIN: CPT | Performed by: NURSE PRACTITIONER

## 2025-07-23 PROCEDURE — 3074F SYST BP LT 130 MM HG: CPT | Performed by: NURSE PRACTITIONER

## 2025-07-23 ASSESSMENT — FIBROSIS 4 INDEX: FIB4 SCORE: .7636363636363636364

## 2025-07-23 NOTE — PROGRESS NOTES
"Subjective:     CC: ankle swelling    HPI:   Devin is an established patient of Dr. Moreland who presents today with the following:      He has had left ankle swelling and left foot pain x 4 days. The pain is intermittent. The pain is described as sore. Works at [employer]. Aggravated with turning ankle inward. He did not twist his ankle or hear any popping. Interventions tried include ice. Denies falls, trauma, gout history.       Past Medical History[1]    Social History[2]    Current Medications and Prescriptions Ordered in Epic[3]    Allergies:  Seasonal    Health Maintenance: reviewed       Objective:     Vital signs reviewed  Exam:  /80 (BP Location: Left arm, Patient Position: Sitting, BP Cuff Size: Adult)   Pulse 75   Temp 36.2 °C (97.2 °F) (Temporal)   Ht 1.676 m (5' 6\")   Wt 108 kg (238 lb)   SpO2 97%   BMI 38.41 kg/m²  Body mass index is 38.41 kg/m².    Gen: Alert and oriented, No apparent distress.  Neck: Neck is supple, full ROM.  Lungs: Normal effort, no audible wheezes  CV: Skin pink, warm and dry.  Feet:    Right pedal pulse 2+.  No pain to right metatarsals.  No right ankle swelling.  Left pedal pulse 2+.  No pain to left metatarsals. Swelling to left ankle and tenderness to left lateral malleolus and inferior to left lateral malleolus without bruising or instability. No antalgic gait.       Assessment & Plan:     55 y.o. male with the following -     1. Strain of left ankle, initial encounter (Primary)  Acute uncomplicated problem.  Low suspicion for fractures as there is no trauma or injury.  Hold on imaging today.  Start with conservative treatment.  He will start meloxicam 15 mg daily for the next week, ice and elevate his ankle.  Discussed range of motion exercises with a tennis ball.  Follow-up if symptoms worsen.      Return if symptoms worsen or fail to improve.    Please note that this dictation was created using voice recognition software. I have made every reasonable attempt to correct " obvious errors, but I expect that there are errors of grammar and possibly content that I did not discover before finalizing the note.             [1] No past medical history on file.  [2]   Social History  Tobacco Use    Smoking status: Former     Current packs/day: 0.00     Average packs/day: 0.5 packs/day for 19.0 years (9.5 ttl pk-yrs)     Types: Cigarettes     Start date:      Quit date:      Years since quittin.5    Smokeless tobacco: Never   Vaping Use    Vaping status: Never Used   Substance Use Topics    Alcohol use: Not Currently    Drug use: No   [3]   Current Outpatient Medications Ordered in Epic   Medication Sig Dispense Refill    cetirizine (ZYRTEC) 10 MG Tab Take 10 mg by mouth every day.      meloxicam (MOBIC) 15 MG tablet Take 1 Tablet by mouth every day. With foods 30 Tablet 0    fluticasone (FLONASE) 50 MCG/ACT nasal spray Administer 1 Spray into affected nostril(S) every day. 16 g 0    albuterol 108 (90 Base) MCG/ACT Aero Soln inhalation aerosol Inhale 1-2 Puffs every four hours as needed for Shortness of Breath. 1 Each 0     No current [facility]-ordered facility-administered medications on file.

## (undated) DEVICE — NEEDLE MONOPTY 14GAX16CM - (10EA/CA)

## (undated) DEVICE — SET LEADWIRE 5 LEAD BEDSIDE DISPOSABLE ECG (1SET OF 5/EA)

## (undated) DEVICE — SENSOR SPO2 NEO LNCS ADHESIVE (20/BX) SEE USER NOTES

## (undated) DEVICE — ELECTRODE DUAL RETURN W/ CORD - (50/PK)

## (undated) DEVICE — HANDPIECE THUNDERBEAT 5MM 35CM FRONT GRIP TYPE S (5EA/BX)

## (undated) DEVICE — SUTURE 4-0 VICRYL PLUSFS-1 - 27 INCH (36/BX)

## (undated) DEVICE — CANNULA W/SEAL 5X100 Z-THRE - ADED KII (12/BX)

## (undated) DEVICE — WATER IRRIGATION STERILE 1000ML (12EA/CA)

## (undated) DEVICE — RELOAD WITH GRIPPING SURGACE TECHNOLOGY GREEN 60MM (12EA/BX)

## (undated) DEVICE — ENDOSTITCH LOAD UNIT 0 SURGI - 12/CA

## (undated) DEVICE — STAPLER POWERED 60MM (3EA/BX)

## (undated) DEVICE — CANISTER SUCTION 3000ML MECHANICAL FILTER AUTO SHUTOFF MEDI-VAC NONSTERILE LF DISP  (40EA/CA)

## (undated) DEVICE — GLOVE BIOGEL PI INDICATOR SZ 7.5 SURGICAL PF LF -(50/BX 4BX/CA)

## (undated) DEVICE — SET EXTENSION WITH 2 PORTS (48EA/CA) ***PART #2C8610 IS A SUBSTITUTE*****

## (undated) DEVICE — SUTURE2-0 27IN VCRL ANTI VIOL (36PK/BX)

## (undated) DEVICE — PACK GASTRIC BANDING OR - (1/CA)

## (undated) DEVICE — SODIUM CHL IRRIGATION 0.9% 1000ML (12EA/CA)

## (undated) DEVICE — HEAD HOLDER JUNIOR/ADULT

## (undated) DEVICE — GLOVE BIOGEL PI INDICATOR SZ 7.0 SURGICAL PF LF - (50/BX 4BX/CA)

## (undated) DEVICE — DRESSING NON ADHERENT 3 X 4 - STERILE (100/BX 12BX/CA)

## (undated) DEVICE — ENDOSTITCH10MM SUTURING DEVIC - (3/CA)

## (undated) DEVICE — PERISTRIP 60 STAPLE LINE REINFORCEMENT (6EA/CA)

## (undated) DEVICE — SUTURE 0 LIGATING REEL VICRYL PLUS (12PK/BX)

## (undated) DEVICE — SCISSORS 5MM CVD (6EA/BX)

## (undated) DEVICE — TROCAR SEPARATOR 15MMZTHREAD - (6/BX)

## (undated) DEVICE — GLOVE SZ 6.5 BIOGEL PI MICRO - PF LF (50PR/BX)

## (undated) DEVICE — LACTATED RINGERS INJ 1000 ML - (14EA/CA 60CA/PF)

## (undated) DEVICE — ELECTRODE 850 FOAM ADHESIVE - HYDROGEL RADIOTRNSPRNT (50/PK)

## (undated) DEVICE — SUTURE GENERAL

## (undated) DEVICE — SUCTION INSTRUMENT YANKAUER BULBOUS TIP W/O VENT (50EA/CA)

## (undated) DEVICE — TISSEEL 4ML ----MUST ORDER A MIN OF 6EA----

## (undated) DEVICE — APPLICATOR DUPLO SPRAYER (5EA/CA)

## (undated) DEVICE — SLEEVE, VASO, REPROC, LARGE

## (undated) DEVICE — NEPTUNE 4 PORT MANIFOLD - (20/PK)

## (undated) DEVICE — MASK ANESTHESIA ADULT  - (100/CA)

## (undated) DEVICE — TROCAR 5X100 NON BLADED Z-TH - READ KII (6/BX)

## (undated) DEVICE — RELOAD WITH GRIPPING SURFACE TECHNOLOGY BLUE 60MM (12EA/BX)

## (undated) DEVICE — SYSTEM CALIBARATION  GASTRECTOMY 40FR (5/BX)

## (undated) DEVICE — GOWN WARMING X-LARGE FLEX - (20/CA)

## (undated) DEVICE — GLOVE SZ 7 BIOGEL PI MICRO - PF LF (50PR/BX 4BX/CA)

## (undated) DEVICE — KIT ANESTHESIA W/CIRCUIT & 3/LT BAG W/FILTER (20EA/CA)

## (undated) DEVICE — TUBING CLEARLINK DUO-VENT - C-FLO (48EA/CA)

## (undated) DEVICE — BAG RETRIEVAL 12/15 MM INZII (5EA/CA) THIS WILL REPLACE ITEM 75018

## (undated) DEVICE — RELOAD WITH GRIPPING SURFACE TECHNOLOGY GOLD 60MM (12EA/BX)

## (undated) DEVICE — SET TUBING PNEUMOCLEAR HIGH FLOW SMOKE EVACUATION (10EA/BX)

## (undated) DEVICE — CLIP APPLIER 10MM ENDO LARGE (3EA/BX)

## (undated) DEVICE — CHLORAPREP 26 ML APPLICATOR - ORANGE TINT(25/CA)

## (undated) DEVICE — PROTECTOR ULNA NERVE - (36PR/CA)